# Patient Record
Sex: MALE | NOT HISPANIC OR LATINO | Employment: OTHER | URBAN - NONMETROPOLITAN AREA
[De-identification: names, ages, dates, MRNs, and addresses within clinical notes are randomized per-mention and may not be internally consistent; named-entity substitution may affect disease eponyms.]

---

## 2023-09-30 PROBLEM — F51.05 INSOMNIA DUE TO OTHER MENTAL DISORDER: Status: ACTIVE | Noted: 2018-08-29

## 2023-09-30 PROBLEM — F43.21 ADJUSTMENT REACTION, DEPRESSIVE, BRIEF: Status: ACTIVE | Noted: 2018-07-03

## 2023-09-30 PROBLEM — M75.52 SUBACROMIAL BURSITIS OF LEFT SHOULDER JOINT: Status: ACTIVE | Noted: 2023-09-30

## 2023-09-30 PROBLEM — M47.22 OSTEOARTHRITIS OF SPINE WITH RADICULOPATHY, CERVICAL REGION: Status: ACTIVE | Noted: 2023-09-30

## 2023-09-30 PROBLEM — M75.21 BICEPS TENDINITIS OF RIGHT UPPER EXTREMITY: Status: ACTIVE | Noted: 2023-09-30

## 2023-09-30 PROBLEM — M75.41 IMPINGEMENT SYNDROME OF RIGHT SHOULDER: Status: ACTIVE | Noted: 2023-09-30

## 2023-09-30 PROBLEM — M1A.0720 CHRONIC GOUT OF LEFT FOOT: Status: ACTIVE | Noted: 2023-09-30

## 2023-09-30 PROBLEM — E78.2 COMBINED HYPERLIPIDEMIA: Status: ACTIVE | Noted: 2021-05-26

## 2023-09-30 PROBLEM — F40.240 CLAUSTROPHOBIA: Status: ACTIVE | Noted: 2023-09-30

## 2023-09-30 PROBLEM — M19.011 DJD OF RIGHT AC (ACROMIOCLAVICULAR) JOINT: Status: ACTIVE | Noted: 2023-09-30

## 2023-09-30 PROBLEM — R06.02 SHORTNESS OF BREATH: Status: ACTIVE | Noted: 2020-05-14

## 2023-09-30 PROBLEM — M95.8 WINGED SCAPULA OF BOTH SIDES: Status: ACTIVE | Noted: 2023-09-30

## 2023-09-30 PROBLEM — M22.8X1 MALTRACKING OF RIGHT PATELLA: Status: ACTIVE | Noted: 2023-09-30

## 2023-09-30 PROBLEM — D72.829 LEUKOCYTOSIS: Status: ACTIVE | Noted: 2020-11-24

## 2023-09-30 PROBLEM — N40.0 BENIGN PROSTATIC HYPERPLASIA: Status: ACTIVE | Noted: 2023-09-30

## 2023-09-30 PROBLEM — M76.51 PATELLAR TENDINITIS OF RIGHT KNEE: Status: ACTIVE | Noted: 2023-09-30

## 2023-09-30 PROBLEM — M77.12 LEFT LATERAL EPICONDYLITIS: Status: ACTIVE | Noted: 2018-07-03

## 2023-09-30 PROBLEM — F99 INSOMNIA DUE TO OTHER MENTAL DISORDER: Status: ACTIVE | Noted: 2018-08-29

## 2023-09-30 PROBLEM — F17.210 CIGARETTE SMOKER: Status: ACTIVE | Noted: 2020-11-23

## 2023-09-30 PROBLEM — M75.42 ROTATOR CUFF IMPINGEMENT SYNDROME OF LEFT SHOULDER: Status: ACTIVE | Noted: 2023-09-30

## 2023-09-30 PROBLEM — M75.81 TENDINITIS OF RIGHT ROTATOR CUFF: Status: ACTIVE | Noted: 2023-09-30

## 2023-09-30 PROBLEM — J30.2 SEASONAL ALLERGIES: Status: ACTIVE | Noted: 2022-11-23

## 2023-09-30 PROBLEM — S43.431A TEAR OF RIGHT GLENOID LABRUM: Status: ACTIVE | Noted: 2023-09-30

## 2023-09-30 PROBLEM — R31.9 HEMATURIA: Status: ACTIVE | Noted: 2018-08-18

## 2023-09-30 PROBLEM — M15.9 GENERALIZED OSTEOARTHRITIS: Status: ACTIVE | Noted: 2020-11-23

## 2023-09-30 PROBLEM — M70.51 PATELLAR BURSITIS OF RIGHT KNEE: Status: ACTIVE | Noted: 2023-09-30

## 2023-09-30 PROBLEM — F41.9 CHRONIC ANXIETY: Status: ACTIVE | Noted: 2018-07-03

## 2023-09-30 PROBLEM — J30.9 ALLERGIC RHINITIS DUE TO ALLERGEN: Status: ACTIVE | Noted: 2022-10-28

## 2023-09-30 RX ORDER — TAMSULOSIN HYDROCHLORIDE 0.4 MG/1
1 CAPSULE ORAL DAILY
COMMUNITY
Start: 2018-07-03

## 2023-09-30 RX ORDER — CETIRIZINE HYDROCHLORIDE 10 MG/1
10 TABLET ORAL DAILY
COMMUNITY
Start: 2022-11-23

## 2023-09-30 RX ORDER — ALBUTEROL SULFATE 90 UG/1
1-2 AEROSOL, METERED RESPIRATORY (INHALATION) EVERY 6 HOURS PRN
COMMUNITY
Start: 2022-10-28 | End: 2024-06-03 | Stop reason: SDUPTHER

## 2023-09-30 RX ORDER — PAROXETINE HYDROCHLORIDE 20 MG/1
20 TABLET, FILM COATED ORAL DAILY
COMMUNITY
Start: 2023-02-13 | End: 2024-02-02 | Stop reason: SDUPTHER

## 2023-09-30 RX ORDER — METHYLPREDNISOLONE 4 MG/1
TABLET ORAL
COMMUNITY
Start: 2022-09-15 | End: 2024-06-03 | Stop reason: ALTCHOICE

## 2023-09-30 RX ORDER — CHOLECALCIFEROL (VITAMIN D3) 50 MCG
2000 TABLET ORAL DAILY
COMMUNITY

## 2023-09-30 RX ORDER — FLUTICASONE PROPIONATE 220 UG/1
2 AEROSOL, METERED RESPIRATORY (INHALATION) 2 TIMES DAILY
COMMUNITY
Start: 2022-10-28

## 2023-10-02 ENCOUNTER — DOCUMENTATION (OUTPATIENT)
Dept: PHYSICAL THERAPY | Facility: CLINIC | Age: 63
End: 2023-10-02
Payer: COMMERCIAL

## 2023-10-02 ENCOUNTER — APPOINTMENT (OUTPATIENT)
Dept: PHYSICAL THERAPY | Facility: CLINIC | Age: 63
End: 2023-10-02
Payer: COMMERCIAL

## 2023-10-02 NOTE — PROGRESS NOTES
Physical Therapy                 Therapy Communication Note    Patient Name: Vincent P Lanese  MRN: [id number]  Today's Date: 10/2/2023     Discipline: Physical Therapy    Missed Visit Reason:      Missed Time: Cancel    Comment:

## 2023-10-16 ENCOUNTER — TREATMENT (OUTPATIENT)
Dept: PHYSICAL THERAPY | Facility: CLINIC | Age: 63
End: 2023-10-16
Payer: COMMERCIAL

## 2023-10-16 DIAGNOSIS — M79.672 LEFT FOOT PAIN: Primary | ICD-10-CM

## 2023-10-16 DIAGNOSIS — M25.542 METACARPOPHALANGEAL JOINT PAIN OF LEFT HAND: ICD-10-CM

## 2023-10-16 PROCEDURE — 97110 THERAPEUTIC EXERCISES: CPT | Mod: GP | Performed by: PHYSICAL THERAPIST

## 2023-10-16 NOTE — PROGRESS NOTES
Physical Therapy    Physical Therapy Re-Assessment and Treatment    Patient Name: Vincent P Lanese  MRN: [id number]  Today's Date: 10/16/2023  Time Calculation  Start Time: 0906  Stop Time: 0950  Time Calculation (min): 44 min      Assessment:   The pt has made good progress in PT and met the majority of goals.  He has not been to therapy since 9/25/23 and has been following his HEP on land and pool.  At this time, he has no issues, except discomfort on the top of his foot when wearing shoes.  He will follow up with his doctor tomorrow    Plan:   Will Discharge after he sees the doctor tomorrow unless Dr. Bardales identifies further need for therapy.    Current Problem  1. Left foot pain        2. Revision of L 1st MTP joint fusion    Subjective   General  The pt was doing well with his insert and Alamo extension, but in the shoe over a couple of hours, but has a red bump where it rubs on the top of the foot. He isn't sure if he needs a Cortizone shot or a new surgery to take hardware out and will see the doctor tomorrow.    He feels swelling is subsiding and mobility is good.  He is walking a lot.  He works out in the pool including walking, jumping, and running.  He hasn't tried golf yet.  He reports occasional cane use.     Pain current 2/10  Range 1/10 to 8-9/10   Uses compressoin, barefoot, lidocaine spray      Objective   Lower Extremity  ROM   MMT:                                    L                    L   Dorsiflexion            22degrees   5/5  Plantarflexion         60degrees      5/5  Inversion                 50degrees   5/5  Eversion                 30degrees     5/5    EDEMA:       L  Bimalleolar     27.8 cm  Mid Arch   26 .0  cm     SENSATION:     Mild areas of hyper sensitivity, but overall able to tolerate light and deep pressure.  Pt reports that the pressure of a shoe for 2 or more hours  bothers him     Skin:  Good scar mobility.  No heat.  Mildly redness that may be choric due to multiple  surgeries.    Gait;  Normalized gait pattern with symmetrical gait pattern.    Gait speed; 10 meter in 6 seconds   Jogging: symmetrical  Shuttles:  wihtout difficulty  Curb step (8 inch)  leading L or R - Independent    Outcome Measures:  FAAM ADL:  27/84  FAAM sport: 21/84    Treatments:  Bike 8 minutes at level 1  Light jog 20 feet x2  Shuttles 20 feet x2 L and R  Gait with heel strike and push off 40 feet x4      OP EDUCATION:   No further therapy needs. Recommend discharge, but will wait until he sees the doctor tomorrow.    Goals:   1.  Abolish pain (PARTIALLY MET)  2. Increase L ankle dorsiflexion ROM to 20 degrees (MET)  3. Able to perform prolonged standing activities and gait without swelling or reported limping(PARTIALLY MET)  4. Improved balance to 20+ seconds of SLS on either LE  (MET)  5. reduce hypersensitivity in the L foot (PARTIALLY MET)  6. ambulate with normaized gait pattern on level and advanced surfaces  (MET)  7. ascend and descend curb steps indpendently (MET)  8. Independent in a HEP addressing any remaining deficits  (MET)  9.  Return to prior level of activity including golf (PARTIALLY MET)

## 2023-11-01 ENCOUNTER — TELEPHONE (OUTPATIENT)
Dept: PRIMARY CARE | Facility: CLINIC | Age: 63
End: 2023-11-01
Payer: COMMERCIAL

## 2023-11-01 DIAGNOSIS — F41.9 CHRONIC ANXIETY: ICD-10-CM

## 2023-11-01 RX ORDER — LORAZEPAM 0.5 MG/1
0.5 TABLET ORAL 2 TIMES DAILY PRN
COMMUNITY
Start: 2023-09-30 | End: 2023-11-01 | Stop reason: SDUPTHER

## 2023-11-01 RX ORDER — LORAZEPAM 0.5 MG/1
0.5 TABLET ORAL 2 TIMES DAILY PRN
Qty: 30 TABLET | Refills: 0 | Status: SHIPPED | OUTPATIENT
Start: 2023-11-01 | End: 2023-12-04 | Stop reason: SDUPTHER

## 2023-11-28 ENCOUNTER — DOCUMENTATION (OUTPATIENT)
Dept: PHYSICAL THERAPY | Facility: CLINIC | Age: 63
End: 2023-11-28
Payer: COMMERCIAL

## 2023-11-28 NOTE — PROGRESS NOTES
.Physical Therapy    Discharge Summary        Discharge Information:   Discahrge date: 11/28/23  Date of last treatment: 10/16/23  Date of evaluation  8/2/23  Number of sessions  14    Referred for: L revision of 1st MTP joint fusion  Referred by:    Therapy Summary:   See re-assessment on 10/16/23 for objective measures.    Discharge Status:   Goals were partially met or  met.      Rehab Discharge Reason:   The pt was placed on hold for a one month trail of self management in 10/16/23.  He did not return for treatment of communicate any further need for therapy.    Discharge from PT at this time

## 2023-12-04 ENCOUNTER — TELEPHONE (OUTPATIENT)
Dept: PRIMARY CARE | Facility: CLINIC | Age: 63
End: 2023-12-04
Payer: COMMERCIAL

## 2023-12-04 DIAGNOSIS — F41.9 CHRONIC ANXIETY: ICD-10-CM

## 2023-12-04 NOTE — TELEPHONE ENCOUNTER
Rx Refill Request Telephone Encounter    Name:  Vincent P Lanese  :  [id number]  Medication Name:  Lorazepam .5 mg            Specific [facility] location:  [facility]  Date of last appointment:    Date of next appointment:    Best number to reach patient:

## 2023-12-05 RX ORDER — LORAZEPAM 0.5 MG/1
0.5 TABLET ORAL 2 TIMES DAILY PRN
Qty: 30 TABLET | Refills: 0 | Status: SHIPPED | OUTPATIENT
Start: 2023-12-05 | End: 2024-01-04 | Stop reason: SDUPTHER

## 2024-01-03 ENCOUNTER — TELEPHONE (OUTPATIENT)
Dept: PRIMARY CARE | Facility: CLINIC | Age: 64
End: 2024-01-03
Payer: COMMERCIAL

## 2024-01-03 DIAGNOSIS — F43.21 ADJUSTMENT REACTION, DEPRESSIVE, BRIEF: ICD-10-CM

## 2024-01-03 DIAGNOSIS — F41.9 CHRONIC ANXIETY: ICD-10-CM

## 2024-01-04 RX ORDER — LORAZEPAM 0.5 MG/1
0.5 TABLET ORAL 2 TIMES DAILY PRN
Qty: 30 TABLET | Refills: 0 | Status: SHIPPED | OUTPATIENT
Start: 2024-01-04 | End: 2024-02-02 | Stop reason: SDUPTHER

## 2024-01-04 NOTE — TELEPHONE ENCOUNTER
Please advise if you are refilling for the 90 day buffer period     Referral pended for psychiatry

## 2024-02-02 DIAGNOSIS — F41.9 CHRONIC ANXIETY: ICD-10-CM

## 2024-02-02 NOTE — TELEPHONE ENCOUNTER
Rx's pended. He has not been informed of need for psychiatry referral yet so I did advise him this would be his last refill. Patient verbalized understanding.

## 2024-02-03 RX ORDER — LORAZEPAM 0.5 MG/1
0.5 TABLET ORAL 2 TIMES DAILY PRN
Qty: 30 TABLET | Refills: 0 | Status: SHIPPED | OUTPATIENT
Start: 2024-02-03

## 2024-02-03 RX ORDER — PAROXETINE HYDROCHLORIDE 20 MG/1
20 TABLET, FILM COATED ORAL DAILY
Qty: 90 TABLET | Refills: 1 | Status: SHIPPED | OUTPATIENT
Start: 2024-02-03

## 2024-02-12 ENCOUNTER — LAB (OUTPATIENT)
Dept: LAB | Facility: LAB | Age: 64
End: 2024-02-12
Payer: COMMERCIAL

## 2024-02-12 DIAGNOSIS — E78.2 MIXED HYPERLIPIDEMIA: Primary | ICD-10-CM

## 2024-02-12 LAB
ALBUMIN SERPL BCP-MCNC: 4.5 G/DL (ref 3.4–5)
ALP SERPL-CCNC: 94 U/L (ref 33–136)
ALT SERPL W P-5'-P-CCNC: 19 U/L (ref 10–52)
ANION GAP SERPL CALC-SCNC: 10 MMOL/L (ref 10–20)
APPEARANCE UR: CLEAR
AST SERPL W P-5'-P-CCNC: 19 U/L (ref 9–39)
BASOPHILS # BLD AUTO: 0.07 X10*3/UL (ref 0–0.1)
BASOPHILS NFR BLD AUTO: 1 %
BILIRUB SERPL-MCNC: 0.9 MG/DL (ref 0–1.2)
BILIRUB UR STRIP.AUTO-MCNC: NEGATIVE MG/DL
BUN SERPL-MCNC: 14 MG/DL (ref 6–23)
CALCIUM SERPL-MCNC: 9.6 MG/DL (ref 8.6–10.3)
CHLORIDE SERPL-SCNC: 104 MMOL/L (ref 98–107)
CHOLEST SERPL-MCNC: 211 MG/DL (ref 0–199)
CHOLESTEROL/HDL RATIO: 6
CO2 SERPL-SCNC: 29 MMOL/L (ref 21–32)
COLOR UR: YELLOW
CREAT SERPL-MCNC: 1.24 MG/DL (ref 0.5–1.3)
EGFRCR SERPLBLD CKD-EPI 2021: 66 ML/MIN/1.73M*2
EOSINOPHIL # BLD AUTO: 0.22 X10*3/UL (ref 0–0.7)
EOSINOPHIL NFR BLD AUTO: 3.1 %
ERYTHROCYTE [DISTWIDTH] IN BLOOD BY AUTOMATED COUNT: 13.2 % (ref 11.5–14.5)
GLUCOSE SERPL-MCNC: 89 MG/DL (ref 74–99)
GLUCOSE UR STRIP.AUTO-MCNC: NEGATIVE MG/DL
HCT VFR BLD AUTO: 42.7 % (ref 41–52)
HDLC SERPL-MCNC: 35.2 MG/DL
HGB BLD-MCNC: 14.4 G/DL (ref 13.5–17.5)
IMM GRANULOCYTES # BLD AUTO: 0.02 X10*3/UL (ref 0–0.7)
IMM GRANULOCYTES NFR BLD AUTO: 0.3 % (ref 0–0.9)
KETONES UR STRIP.AUTO-MCNC: NEGATIVE MG/DL
LDLC SERPL CALC-MCNC: 157 MG/DL
LEUKOCYTE ESTERASE UR QL STRIP.AUTO: NEGATIVE
LYMPHOCYTES # BLD AUTO: 2.63 X10*3/UL (ref 1.2–4.8)
LYMPHOCYTES NFR BLD AUTO: 37.4 %
MCH RBC QN AUTO: 29.8 PG (ref 26–34)
MCHC RBC AUTO-ENTMCNC: 33.7 G/DL (ref 32–36)
MCV RBC AUTO: 88 FL (ref 80–100)
MONOCYTES # BLD AUTO: 0.58 X10*3/UL (ref 0.1–1)
MONOCYTES NFR BLD AUTO: 8.3 %
MUCOUS THREADS #/AREA URNS AUTO: NORMAL /LPF
NEUTROPHILS # BLD AUTO: 3.51 X10*3/UL (ref 1.2–7.7)
NEUTROPHILS NFR BLD AUTO: 49.9 %
NITRITE UR QL STRIP.AUTO: NEGATIVE
NON HDL CHOLESTEROL: 176 MG/DL (ref 0–149)
NRBC BLD-RTO: 0 /100 WBCS (ref 0–0)
PH UR STRIP.AUTO: 5 [PH]
PLATELET # BLD AUTO: 243 X10*3/UL (ref 150–450)
POTASSIUM SERPL-SCNC: 4.6 MMOL/L (ref 3.5–5.3)
PROT SERPL-MCNC: 6.5 G/DL (ref 6.4–8.2)
PROT UR STRIP.AUTO-MCNC: NEGATIVE MG/DL
RBC # BLD AUTO: 4.83 X10*6/UL (ref 4.5–5.9)
RBC # UR STRIP.AUTO: ABNORMAL /UL
RBC #/AREA URNS AUTO: NORMAL /HPF
SODIUM SERPL-SCNC: 138 MMOL/L (ref 136–145)
SP GR UR STRIP.AUTO: 1.02
TRIGL SERPL-MCNC: 92 MG/DL (ref 0–149)
UROBILINOGEN UR STRIP.AUTO-MCNC: <2 MG/DL
VLDL: 18 MG/DL (ref 0–40)
WBC # BLD AUTO: 7 X10*3/UL (ref 4.4–11.3)
WBC #/AREA URNS AUTO: NORMAL /HPF

## 2024-02-12 PROCEDURE — 36415 COLL VENOUS BLD VENIPUNCTURE: CPT

## 2024-02-14 ENCOUNTER — OFFICE VISIT (OUTPATIENT)
Dept: PRIMARY CARE | Facility: CLINIC | Age: 64
End: 2024-02-14
Payer: COMMERCIAL

## 2024-02-14 ENCOUNTER — TELEPHONE (OUTPATIENT)
Dept: PRIMARY CARE | Facility: CLINIC | Age: 64
End: 2024-02-14

## 2024-02-14 VITALS
SYSTOLIC BLOOD PRESSURE: 100 MMHG | OXYGEN SATURATION: 98 % | WEIGHT: 195 LBS | HEIGHT: 66 IN | HEART RATE: 65 BPM | DIASTOLIC BLOOD PRESSURE: 62 MMHG | BODY MASS INDEX: 31.34 KG/M2 | RESPIRATION RATE: 22 BRPM

## 2024-02-14 DIAGNOSIS — Z23 IMMUNIZATION DUE: Primary | ICD-10-CM

## 2024-02-14 DIAGNOSIS — F43.21 ADJUSTMENT DISORDER WITH DEPRESSED MOOD: ICD-10-CM

## 2024-02-14 DIAGNOSIS — Z00.00 WELL ADULT EXAM: ICD-10-CM

## 2024-02-14 DIAGNOSIS — Z72.0 TOBACCO ABUSE: ICD-10-CM

## 2024-02-14 DIAGNOSIS — R53.83 OTHER FATIGUE: ICD-10-CM

## 2024-02-14 DIAGNOSIS — N41.9 PROSTATITIS, UNSPECIFIED PROSTATITIS TYPE: ICD-10-CM

## 2024-02-14 DIAGNOSIS — Z00.00 ROUTINE GENERAL MEDICAL EXAMINATION AT A HEALTH CARE FACILITY: ICD-10-CM

## 2024-02-14 DIAGNOSIS — E78.5 HYPERLIPIDEMIA, UNSPECIFIED HYPERLIPIDEMIA TYPE: ICD-10-CM

## 2024-02-14 DIAGNOSIS — E78.2 COMBINED HYPERLIPIDEMIA: ICD-10-CM

## 2024-02-14 DIAGNOSIS — Z12.5 SPECIAL SCREENING FOR MALIGNANT NEOPLASM OF PROSTATE: ICD-10-CM

## 2024-02-14 PROCEDURE — G0008 ADMIN INFLUENZA VIRUS VAC: HCPCS | Performed by: FAMILY MEDICINE

## 2024-02-14 PROCEDURE — 99396 PREV VISIT EST AGE 40-64: CPT | Performed by: FAMILY MEDICINE

## 2024-02-14 PROCEDURE — 90686 IIV4 VACC NO PRSV 0.5 ML IM: CPT | Performed by: FAMILY MEDICINE

## 2024-02-14 PROCEDURE — 4004F PT TOBACCO SCREEN RCVD TLK: CPT | Performed by: FAMILY MEDICINE

## 2024-02-14 RX ORDER — SULFAMETHOXAZOLE AND TRIMETHOPRIM 800; 160 MG/1; MG/1
1 TABLET ORAL 2 TIMES DAILY
COMMUNITY
End: 2024-06-03 | Stop reason: ALTCHOICE

## 2024-02-14 RX ORDER — CIPROFLOXACIN 500 MG/1
500 TABLET ORAL 2 TIMES DAILY
COMMUNITY
Start: 2024-01-13 | End: 2024-06-03 | Stop reason: ALTCHOICE

## 2024-02-14 ASSESSMENT — ENCOUNTER SYMPTOMS
OCCASIONAL FEELINGS OF UNSTEADINESS: 0
DEPRESSION: 0
LOSS OF SENSATION IN FEET: 0

## 2024-02-14 ASSESSMENT — PATIENT HEALTH QUESTIONNAIRE - PHQ9
1. LITTLE INTEREST OR PLEASURE IN DOING THINGS: NOT AT ALL
2. FEELING DOWN, DEPRESSED OR HOPELESS: NOT AT ALL
SUM OF ALL RESPONSES TO PHQ9 QUESTIONS 1 AND 2: 0

## 2024-02-14 ASSESSMENT — PAIN SCALES - GENERAL: PAINLEVEL: 10-WORST PAIN EVER

## 2024-02-14 NOTE — PROGRESS NOTES
Subjective   Patient ID: Vincent P Lanese is a 63 y.o. male who presents for Annual Exam.    HPI Jett is seen for for his comprehensive physical exam. PMH, PSH, family history and social history were reviewed and updated.  Jett is seen today for follow-up of longstanding depression and anxiety . he has been relatively well controlled currently on Paxil 20 mg qd and doing well. for his anxiety he also uses lorazepam 0.5 mg prn for episodes of anxiety.   Jett is seen today for follow-up of significant multiple musculoskeletal complaints. previously has had labral tear of the right shoulder back in May of 2019 as well as an October of 2019 he had the same repair to his left shoulder with the labrum repair. He suffers from chronic intermittent neck spasms, with a history of cervical degenerative joint disease .  Jett is seen today for follow-up of benign prostatic hypertrophy and he sees Urology. He is on medication at this time.He sees urology.   Pt had tetanus shot in 2021. Has not had shingles shot. He has agreed to get shingles series in the near future.  Had one pneumococcal immunization. He will receive influenza today.   Pt is unsure when he had colonoscopy.  He will contact GI.  He smokes 1/2 ppd and will getting nicotine patches to help quit.  He intends to use nicotine gum.  He does not use alcohol.    Review of Systems  Constitutional Symptoms: He is negative for fever, loss of appetite, headaches, fatigue.   Eyes: He is negative for loss and blurring of vision, double vision.   Ear, Nose, Mouth, Throat: He is negative for hearing loss, tinnitus, nasal congestion, rhinorrhea, nose bleeds, teeth problems, mouth sores, gum disease, dysphagia, sore throat.   Cardiovascular: He is negative for chest pain/pressure, palpitations, edema, claudication.   Respiratory: He is negative for shortness of breath, dyspnea on exertion, pain with breathing, coughing.   Breast: He is negative for tenderness, masses,  "gynecomastia.   Gastrointestinal: He is negative for anorexia, indigestion, nausea, vomiting, abdominal pain, change in bowel habits, diarrhea, constipation, hematochezia, melena, blood in stool.   Musculoskeletal: He is Positive for joint pain , stiffness , myalgias . Negative for joint swelling, myalgias, cramps.   Integumentary: He is negative for change in mole, skin trouble or rash.   Neurological: He is negative for headache, numbness, tingling, weakness, tremors.   Psychiatric: is positive for depression and anxiety   Endocrine: He is negative for weight gain, heat or cold intolerance, polyuria, polydipsia, polyphagia.   Hematologic/Lymphatic: He is negative for bruising, abnormal bleeding, swollen glands.  Objective   /62 (BP Location: Left arm, Patient Position: Sitting, BP Cuff Size: Large adult)   Pulse 65   Resp 22   Ht 1.676 m (5' 6\")   Wt 88.5 kg (195 lb)   SpO2 98%   BMI 31.47 kg/m²     Physical Exam  general: Vitals reviewed , Jett sits in the exam room chair . He is a pleasant animated male. Awake alert oriented.   HEENT : Head is normocephalic atraumatic. Well groomed short cropped hair with male pattern alopecia noted .   Ears: Normal externally , moderate cerumen in the canals , TMs not visualized.  Eyes : Conjunctiva and sclera clear , pupils equal round reactive , EOMI.   Nose: moist oral mucosa   Oropharynx: Not examined due to mask.   Neck: Soft and supple without adenopathy thyromegaly or asymmetry .   Chest: Normal to inspection , no barrel chest . Pulmonary : Clear breath sounds posteriorly without wheezing rales rhonchi.   Cardiovascular : Regular rate rhythm , no murmurs rubs or gallops. Normal S1-S2. Carotids have no bruit bilaterally . DP and radial pulses are both 2+ . There is no clubbing, no edema.  Abdomen: Flat , soft , no tenderness guarding rigidity.   Genitourinary / anorectal: deferred. Patient sees urology.  Musculoskeletal: Left lower extremity is in a postop " shoe. relatively good strength and range of motion throughout the upper lower extremities .   Neurologic: Intact and nonfocal , cranial nerves 2-12 grossly intact .   Integumentary: No bruising rashes or eruptions.  Psych: Mood and affect are pleasant and appropriate.  Assessment/Plan   Problem List Items Addressed This Visit    None  Visit Diagnoses         Codes    Immunization due    -  Primary  Patient to receive influenza immunization today.  Patient will return in a month for shingles #1, 3 months after that shingles #2. Z23    Relevant Orders    Flu vaccine (IIV4) age 6 months and greater, preservative free    Follow Up In Primary Care - Nurse Visit    Follow Up In Primary Care - Nurse Visit    Zoster vaccine, recombinant, adult (SHINGRIX)    Zoster vaccine, recombinant, adult (SHINGRIX)    Tobacco abuse    Needs to quit.  Patient smokes about a half a pack a day and has done so for 35 years.  He is intends to attempt nicotine gum. Z72.0    Adjustment disorder with depressed mood    Stable.  Continue on paroxetine. F43.21    Well adult exam    Normal exam. Z00.00    Hyperlipidemia, unspecified hyperlipidemia type    Monitor.  Patient has an LDL of 157.  His total was 217.  It was elevated like this about 4 years ago.  However he has no family history or personal history of hyperlipidemia. E78.5    Prostatitis, unspecified prostatitis type    Needs better control.  Currently under care for urology.  He is been on antibiotics for almost 4 months. N41.9

## 2024-05-02 ENCOUNTER — TELEPHONE (OUTPATIENT)
Dept: PRIMARY CARE | Facility: CLINIC | Age: 64
End: 2024-05-02
Payer: COMMERCIAL

## 2024-05-02 DIAGNOSIS — J30.2 SEASONAL ALLERGIES: Primary | ICD-10-CM

## 2024-05-02 RX ORDER — CETIRIZINE HYDROCHLORIDE 10 MG/1
10 TABLET ORAL DAILY
Qty: 30 TABLET | Refills: 2 | Status: SHIPPED | OUTPATIENT
Start: 2024-05-02 | End: 2024-06-03 | Stop reason: SDUPTHER

## 2024-05-02 RX ORDER — FLUTICASONE PROPIONATE 50 MCG
1 SPRAY, SUSPENSION (ML) NASAL DAILY
Qty: 16 G | Refills: 11 | Status: SHIPPED | OUTPATIENT
Start: 2024-05-02 | End: 2024-06-03 | Stop reason: SDUPTHER

## 2024-05-02 NOTE — TELEPHONE ENCOUNTER
PATIENT WOULD LIKE A PRESCRIPTION CALLED IN FOR HIS ALLERGIES BEFORE THE ILLNESS AFFECTS HIS LUNGS.    PATIENT STATES HE HAS HIS MOTHER TODAY AND CAN'T COME TO THE OFFICE FOR AN APPOINTMENT.    PHARMACY [facility]    PATIENT CAN BE REACHED [phone]

## 2024-05-17 ENCOUNTER — TELEPHONE (OUTPATIENT)
Dept: PRIMARY CARE | Facility: CLINIC | Age: 64
End: 2024-05-17
Payer: COMMERCIAL

## 2024-05-29 ENCOUNTER — TELEPHONE (OUTPATIENT)
Dept: PRIMARY CARE | Facility: CLINIC | Age: 64
End: 2024-05-29
Payer: COMMERCIAL

## 2024-05-29 DIAGNOSIS — J30.9 ALLERGIC RHINITIS, UNSPECIFIED SEASONALITY, UNSPECIFIED TRIGGER: Primary | ICD-10-CM

## 2024-05-29 RX ORDER — METHYLPREDNISOLONE 4 MG/1
TABLET ORAL
Qty: 21 TABLET | Refills: 0 | Status: SHIPPED | OUTPATIENT
Start: 2024-05-29 | End: 2024-06-04 | Stop reason: ALTCHOICE

## 2024-05-29 NOTE — TELEPHONE ENCOUNTER
Patient went to [facility] 5/21 for URI and still has cough with chest congestion and a little rattle, after the albuterol inhaler and Medrol Dose Sd. His car is not working so [place]. He has tried everything OTC.  Could he get another Medrol Dose Sd to [facility] please? Otherwise, he will find a ride for tomorrow.

## 2024-06-03 ENCOUNTER — OFFICE VISIT (OUTPATIENT)
Dept: PRIMARY CARE | Facility: CLINIC | Age: 64
End: 2024-06-03
Payer: COMMERCIAL

## 2024-06-03 VITALS
HEART RATE: 76 BPM | SYSTOLIC BLOOD PRESSURE: 128 MMHG | OXYGEN SATURATION: 96 % | DIASTOLIC BLOOD PRESSURE: 70 MMHG | BODY MASS INDEX: 31.96 KG/M2 | WEIGHT: 198 LBS

## 2024-06-03 DIAGNOSIS — J30.2 SEASONAL ALLERGIES: ICD-10-CM

## 2024-06-03 DIAGNOSIS — R06.02 SOB (SHORTNESS OF BREATH): Primary | ICD-10-CM

## 2024-06-03 PROCEDURE — 99214 OFFICE O/P EST MOD 30 MIN: CPT | Performed by: FAMILY MEDICINE

## 2024-06-03 RX ORDER — CETIRIZINE HYDROCHLORIDE 10 MG/1
10 TABLET ORAL DAILY
Qty: 30 TABLET | Refills: 2 | Status: SHIPPED | OUTPATIENT
Start: 2024-06-03 | End: 2024-09-01

## 2024-06-03 RX ORDER — FLUTICASONE PROPIONATE 50 MCG
1 SPRAY, SUSPENSION (ML) NASAL DAILY
Qty: 16 G | Refills: 11 | Status: SHIPPED | OUTPATIENT
Start: 2024-06-03 | End: 2025-06-03

## 2024-06-03 RX ORDER — FLUTICASONE FUROATE 100 UG/1
1 POWDER RESPIRATORY (INHALATION) DAILY
Qty: 1 EACH | Refills: 11 | Status: SHIPPED | OUTPATIENT
Start: 2024-06-03 | End: 2025-06-03

## 2024-06-03 RX ORDER — ALBUTEROL SULFATE 90 UG/1
1-2 AEROSOL, METERED RESPIRATORY (INHALATION) EVERY 6 HOURS PRN
Qty: 18 G | Refills: 2 | Status: SHIPPED | OUTPATIENT
Start: 2024-06-03

## 2024-06-03 ASSESSMENT — PAIN SCALES - GENERAL: PAINLEVEL: 0-NO PAIN

## 2024-06-04 NOTE — PROGRESS NOTES
"Subjective   Patient ID: Vincent P Lanese is a 63 y.o. male who presents for Cough (Feels like something in his lungs/chest-  thinks he \"swallowed some grass from his weed [place]\"/He was seen at Urgent care approximately 2 weeks ago and was given prednisone taper and inhaler.//Also finished 2nd Prednisone taper Rx called in from this office -  he finished it today).    HPI patient is here today for a cough and some chest congestion with a little bit of subjective wheezing.  He does not have a history of recurrent seasonal allergies but has noticed that he gets much more wheezy when he is mowing the lawn.  He went to an urgent care and received a prednisone taper which seemed to help him.  He also has been using an inhaler frequently to help with his wheezing and shortness of breath.    Review of Systems  Constitutional: Patient is negative for fever, fatigue, weight change.  HEENT: Patient is positive for postnasal drip that is clear.  Patient is negative for change in vision, hearing, swallow.  Pulmonary: Patient is positive for wheezing and mild shortness of breath.  Patient is negative for cough.  Cardio: Patient is negative for chest pain, lower extremity edema.  Objective   /70 (BP Location: Left arm, Patient Position: Sitting)   Pulse 76   Wt 89.8 kg (198 lb)   SpO2 96%   BMI 31.96 kg/m²     Physical Exam  General: Awake and alert no apparent distress.  HEENT: Moist oral mucosa no cervical lymphadenopathy.  Cardio: Heart S1-S2 no murmur rub or gallop.  Pulmonary: Lungs clear to auscultation laterally.  Assessment/Plan   Problem List Items Addressed This Visit             ICD-10-CM    Seasonal allergies needs better control.  Begin cetirizine and fluticasone. J30.2    Relevant Medications    cetirizine (ZyrTEC) 10 mg tablet    fluticasone (Flonase) 50 mcg/actuation nasal spray    albuterol (ProAir HFA) 90 mcg/actuation inhaler    fluticasone furoate (Arnuity Ellipta) 100 mcg/actuation inhaler "     Other Visit Diagnoses         Codes    SOB (shortness of breath)    -  Primary   needs better control.  Begin fluticasone inhaler and refill albuterol MDI for as needed use. R06.02

## 2024-06-13 ENCOUNTER — TELEPHONE (OUTPATIENT)
Dept: PRIMARY CARE | Facility: CLINIC | Age: 64
End: 2024-06-13

## 2024-06-13 ENCOUNTER — APPOINTMENT (OUTPATIENT)
Dept: PRIMARY CARE | Facility: CLINIC | Age: 64
End: 2024-06-13
Payer: COMMERCIAL

## 2024-06-13 VITALS — SYSTOLIC BLOOD PRESSURE: 112 MMHG | TEMPERATURE: 98.7 F | DIASTOLIC BLOOD PRESSURE: 74 MMHG

## 2024-06-13 DIAGNOSIS — Z23 IMMUNIZATION DUE: Primary | ICD-10-CM

## 2024-06-13 DIAGNOSIS — Z23 IMMUNIZATION DUE: ICD-10-CM

## 2024-06-13 PROCEDURE — 90750 HZV VACC RECOMBINANT IM: CPT | Performed by: FAMILY MEDICINE

## 2024-06-13 PROCEDURE — 90471 IMMUNIZATION ADMIN: CPT | Performed by: FAMILY MEDICINE

## 2024-06-13 PROCEDURE — 4004F PT TOBACCO SCREEN RCVD TLK: CPT | Performed by: FAMILY MEDICINE

## 2024-06-13 PROCEDURE — 99024 POSTOP FOLLOW-UP VISIT: CPT | Performed by: FAMILY MEDICINE

## 2024-06-13 NOTE — PROGRESS NOTES
Subjective   Patient ID: Vincent P Lanese is a 63 y.o. male who presents for Immunizations (Shingles vaccine).    HPI     Review of Systems    Objective   /74 (BP Location: Left arm, Patient Position: Sitting, BP Cuff Size: Large adult)   Temp 37.1 °C (98.7 °F)     Physical Exam    Assessment/Plan        Patient not seen for office visit today.  He was here for Shingrix immunization.  This was not an office visit this was a nurse visit.

## 2024-07-16 ENCOUNTER — HOSPITAL ENCOUNTER (OUTPATIENT)
Dept: RADIOLOGY | Facility: CLINIC | Age: 64
Discharge: HOME | End: 2024-07-16
Payer: COMMERCIAL

## 2024-07-16 ENCOUNTER — OFFICE VISIT (OUTPATIENT)
Dept: PRIMARY CARE | Facility: CLINIC | Age: 64
End: 2024-07-16
Payer: COMMERCIAL

## 2024-07-16 ENCOUNTER — LAB (OUTPATIENT)
Dept: LAB | Facility: LAB | Age: 64
End: 2024-07-16
Payer: COMMERCIAL

## 2024-07-16 VITALS
DIASTOLIC BLOOD PRESSURE: 70 MMHG | HEART RATE: 67 BPM | WEIGHT: 195.4 LBS | BODY MASS INDEX: 31.54 KG/M2 | SYSTOLIC BLOOD PRESSURE: 110 MMHG | OXYGEN SATURATION: 95 %

## 2024-07-16 DIAGNOSIS — R10.11 RIGHT UPPER QUADRANT ABDOMINAL PAIN: ICD-10-CM

## 2024-07-16 DIAGNOSIS — R10.84 GENERALIZED ABDOMINAL PAIN: ICD-10-CM

## 2024-07-16 DIAGNOSIS — R10.11 RIGHT UPPER QUADRANT ABDOMINAL PAIN: Primary | ICD-10-CM

## 2024-07-16 DIAGNOSIS — R07.81 RIB PAIN ON RIGHT SIDE: ICD-10-CM

## 2024-07-16 LAB
ALBUMIN SERPL-MCNC: 4.9 G/DL (ref 3.5–5)
ALP BLD-CCNC: 105 U/L (ref 35–125)
ALT SERPL-CCNC: 19 U/L (ref 5–40)
AMYLASE SERPL-CCNC: 93 U/L (ref 28–100)
ANION GAP SERPL CALC-SCNC: 14 MMOL/L
AST SERPL-CCNC: 14 U/L (ref 5–40)
BASOPHILS # BLD AUTO: 0.08 X10*3/UL (ref 0–0.1)
BASOPHILS NFR BLD AUTO: 0.8 %
BILIRUB SERPL-MCNC: 0.6 MG/DL (ref 0.1–1.2)
BUN SERPL-MCNC: 20 MG/DL (ref 8–25)
CALCIUM SERPL-MCNC: 10.3 MG/DL (ref 8.5–10.4)
CHLORIDE SERPL-SCNC: 99 MMOL/L (ref 97–107)
CO2 SERPL-SCNC: 26 MMOL/L (ref 24–31)
CREAT SERPL-MCNC: 1.4 MG/DL (ref 0.4–1.6)
EGFRCR SERPLBLD CKD-EPI 2021: 57 ML/MIN/1.73M*2
EOSINOPHIL # BLD AUTO: 0.12 X10*3/UL (ref 0–0.7)
EOSINOPHIL NFR BLD AUTO: 1.3 %
ERYTHROCYTE [DISTWIDTH] IN BLOOD BY AUTOMATED COUNT: 12.7 % (ref 11.5–14.5)
GLUCOSE SERPL-MCNC: 108 MG/DL (ref 65–99)
HCT VFR BLD AUTO: 48.1 % (ref 41–52)
HGB BLD-MCNC: 16.1 G/DL (ref 13.5–17.5)
IMM GRANULOCYTES # BLD AUTO: 0.03 X10*3/UL (ref 0–0.7)
IMM GRANULOCYTES NFR BLD AUTO: 0.3 % (ref 0–0.9)
LIPASE SERPL-CCNC: 55 U/L (ref 16–63)
LYMPHOCYTES # BLD AUTO: 2.57 X10*3/UL (ref 1.2–4.8)
LYMPHOCYTES NFR BLD AUTO: 27 %
MCH RBC QN AUTO: 30.1 PG (ref 26–34)
MCHC RBC AUTO-ENTMCNC: 33.5 G/DL (ref 32–36)
MCV RBC AUTO: 90 FL (ref 80–100)
MONOCYTES # BLD AUTO: 0.57 X10*3/UL (ref 0.1–1)
MONOCYTES NFR BLD AUTO: 6 %
NEUTROPHILS # BLD AUTO: 6.15 X10*3/UL (ref 1.2–7.7)
NEUTROPHILS NFR BLD AUTO: 64.6 %
NRBC BLD-RTO: 0 /100 WBCS (ref 0–0)
PLATELET # BLD AUTO: 253 X10*3/UL (ref 150–450)
POC APPEARANCE, URINE: CLEAR
POC BILIRUBIN, URINE: NEGATIVE
POC BLOOD, URINE: ABNORMAL
POC COLOR, URINE: YELLOW
POC GLUCOSE, URINE: NEGATIVE MG/DL
POC KETONES, URINE: NEGATIVE MG/DL
POC LEUKOCYTES, URINE: NEGATIVE
POC NITRITE,URINE: NEGATIVE
POC PH, URINE: 6 PH
POC PROTEIN, URINE: NEGATIVE MG/DL
POC SPECIFIC GRAVITY, URINE: 1.01
POC UROBILINOGEN, URINE: 0.2 EU/DL
POTASSIUM SERPL-SCNC: 4.7 MMOL/L (ref 3.4–5.1)
PROT SERPL-MCNC: 7.1 G/DL (ref 5.9–7.9)
RBC # BLD AUTO: 5.34 X10*6/UL (ref 4.5–5.9)
SODIUM SERPL-SCNC: 139 MMOL/L (ref 133–145)
WBC # BLD AUTO: 9.5 X10*3/UL (ref 4.4–11.3)

## 2024-07-16 PROCEDURE — 36415 COLL VENOUS BLD VENIPUNCTURE: CPT

## 2024-07-16 PROCEDURE — 99214 OFFICE O/P EST MOD 30 MIN: CPT

## 2024-07-16 PROCEDURE — 83690 ASSAY OF LIPASE: CPT

## 2024-07-16 PROCEDURE — 71046 X-RAY EXAM CHEST 2 VIEWS: CPT | Performed by: RADIOLOGY

## 2024-07-16 PROCEDURE — 71046 X-RAY EXAM CHEST 2 VIEWS: CPT

## 2024-07-16 PROCEDURE — 85025 COMPLETE CBC W/AUTO DIFF WBC: CPT

## 2024-07-16 PROCEDURE — 82150 ASSAY OF AMYLASE: CPT

## 2024-07-16 PROCEDURE — 81003 URINALYSIS AUTO W/O SCOPE: CPT

## 2024-07-16 PROCEDURE — 80053 COMPREHEN METABOLIC PANEL: CPT

## 2024-07-16 ASSESSMENT — PATIENT HEALTH QUESTIONNAIRE - PHQ9
2. FEELING DOWN, DEPRESSED OR HOPELESS: NOT AT ALL
1. LITTLE INTEREST OR PLEASURE IN DOING THINGS: NOT AT ALL
SUM OF ALL RESPONSES TO PHQ9 QUESTIONS 1 AND 2: 0

## 2024-07-16 ASSESSMENT — PAIN SCALES - GENERAL: PAINLEVEL: 0-NO PAIN

## 2024-07-16 NOTE — PROGRESS NOTES
Subjective     Patient ID: Vincent P Lanese is a 63 y.o. male who presents for Follow-up (Cramping under right side of ribs x 2 weeks. Body aches and problems urinating as well as with bowel movements. Has pulled ticks off and worried about lyme disease. His mom got shingles and before she broke out, she had rib pain, too //Reports trouble sleeping and being clammy ).    ENIO Khan presents for concerns of right upper quadrant pain and spasms beneath his right ribs, worse with inspiration.  Reports spasms when he moves his right side torso. Denies any recent trauma or heavy lifting.  Reports a dull pressure pain in the RUQ. He reports he is having abnormal bowel movement over the last 7-10 days and does seem to correlate with his RUQ pain. Feel hot/cold flashes but denies fever.  Occasional nausea, no vomiting. Appetite is good.  Denies increase in abdominal pain with meals but dull aching is constantly there.       Current Outpatient Medications:     albuterol (ProAir HFA) 90 mcg/actuation inhaler, Inhale 1-2 puffs every 6 hours if needed for shortness of breath or wheezing., Disp: 18 g, Rfl: 2    cetirizine (ZyrTEC) 10 mg tablet, Take 1 tablet (10 mg) by mouth once daily., Disp: 30 tablet, Rfl: 2    cholecalciferol (Vitamin D-3) 50 MCG (2000 UT) tablet, Take 1 tablet (2,000 Units) by mouth once daily., Disp: , Rfl:     fluticasone (Flonase) 50 mcg/actuation nasal spray, Administer 1 spray into each nostril once daily. Shake gently. Before first use, prime pump. After use, clean tip and replace cap., Disp: 16 g, Rfl: 11    fluticasone (Flovent HFA) 220 mcg/actuation inhaler, Inhale 2 puffs 2 times a day., Disp: , Rfl:     fluticasone furoate (Arnuity Ellipta) 100 mcg/actuation inhaler, Inhale 1 puff once daily. Rinse mouth with water after use to reduce aftertaste and incidence of candidiasis. Do not swallow., Disp: 1 each, Rfl: 11    PARoxetine (Paxil) 20 mg tablet, Take 1 tablet (20 mg) by mouth once daily.,  Disp: 90 tablet, Rfl: 1    tamsulosin (Flomax) 0.4 mg 24 hr capsule, Take 1 capsule (0.4 mg) by mouth once daily., Disp: , Rfl:     LORazepam (Ativan) 0.5 mg tablet, Take 1 tablet (0.5 mg) by mouth 2 times a day as needed for anxiety. (Patient not taking: Reported on 7/16/2024), Disp: 30 tablet, Rfl: 0      Review of Systems  All other systems have been reviewed and are negative except as noted in the HPI.         Objective       /70 (BP Location: Left arm)   Pulse 67   Wt 88.6 kg (195 lb 6.4 oz)   SpO2 95%   BMI 31.54 kg/m²   Physical Exam  Vitals and nursing note reviewed.   Constitutional:       General: He is not in acute distress.     Appearance: Normal appearance.   Cardiovascular:      Rate and Rhythm: Normal rate and regular rhythm.      Heart sounds: Normal heart sounds, S1 normal and S2 normal.   Pulmonary:      Effort: Pulmonary effort is normal. No accessory muscle usage or respiratory distress.      Breath sounds: Normal breath sounds and air entry. No decreased breath sounds, wheezing, rhonchi or rales.   Chest:      Chest wall: Tenderness (tenderness along the right costal margin) present. No deformity or swelling.   Breasts:     Breasts are symmetrical.   Abdominal:      General: Abdomen is flat. Bowel sounds are normal. There is no distension or abdominal bruit.      Palpations: Abdomen is soft. There is no shifting dullness, fluid wave, hepatomegaly, splenomegaly, mass or pulsatile mass.      Tenderness: There is abdominal tenderness. There is no right CVA tenderness, left CVA tenderness, guarding or rebound.       Musculoskeletal:         General: Normal range of motion.      Right lower leg: No edema.      Left lower leg: No edema.   Skin:     General: Skin is warm and dry.      Findings: No rash.   Neurological:      General: No focal deficit present.      Mental Status: He is alert.   Psychiatric:         Behavior: Behavior is cooperative.             Assessment & Plan  Right upper  quadrant abdominal pain  Acute, new problem with undetermined etiology  No red flags upon assessment.  Differential diagnoses cholecystitis, pleural effusion, gastroenteritis, musculoskeletal strain.  Will plan to obtain labs  Will follow-up with results for further plan and intervention.  Orders:    CBC and Auto Differential; Future    Comprehensive metabolic panel; Future    Lipase; Future    Amylase; Future    Rib pain on right side  Obtain chest x-ray as discussed, will follow-up with results  Orders:    XR chest 2 views; Future    Generalized abdominal pain    Orders:    POCT UA Automated manually resulted          Patient understands and agrees with treatment plan.    Gala Reyes, APRN-CNP

## 2024-07-18 ENCOUNTER — TELEPHONE (OUTPATIENT)
Dept: PRIMARY CARE | Facility: CLINIC | Age: 64
End: 2024-07-18
Payer: COMMERCIAL

## 2024-07-26 ENCOUNTER — TELEPHONE (OUTPATIENT)
Dept: PRIMARY CARE | Facility: CLINIC | Age: 64
End: 2024-07-26
Payer: COMMERCIAL

## 2024-07-26 DIAGNOSIS — R10.11 RIGHT UPPER QUADRANT ABDOMINAL PAIN: Primary | ICD-10-CM

## 2024-07-26 NOTE — TELEPHONE ENCOUNTER
Patient can be reached at [phone]    Patient would like a call from [facility] he's still in Pain under rib cage. Patient stated it's hard to urinate and have a bowel movement. He would like to speak with [facility] regarding next steps since all test came back normal.

## 2024-07-28 ENCOUNTER — HOSPITAL ENCOUNTER (OUTPATIENT)
Dept: RADIOLOGY | Facility: HOSPITAL | Age: 64
Discharge: HOME | End: 2024-07-28
Payer: COMMERCIAL

## 2024-07-28 DIAGNOSIS — R10.11 RIGHT UPPER QUADRANT ABDOMINAL PAIN: ICD-10-CM

## 2024-07-28 PROCEDURE — 76705 ECHO EXAM OF ABDOMEN: CPT | Performed by: RADIOLOGY

## 2024-07-28 PROCEDURE — 76705 ECHO EXAM OF ABDOMEN: CPT

## 2024-07-29 ENCOUNTER — TELEPHONE (OUTPATIENT)
Dept: PRIMARY CARE | Facility: CLINIC | Age: 64
End: 2024-07-29
Payer: COMMERCIAL

## 2024-07-29 NOTE — TELEPHONE ENCOUNTER
Patient called [phone]  he went for the CT scan yesterday he would like to know the results he Is in a lot of pain, told him Dr Hernandez will be in this pm

## 2024-07-29 NOTE — TELEPHONE ENCOUNTER
Pt called back informed  him of Gala message he said he is in so much pain and has nausea he wants a RX for pain  Alexis Nelson advised ER if in that much pain, wants Dr Hernandez to call him

## 2024-08-01 DIAGNOSIS — F41.9 CHRONIC ANXIETY: ICD-10-CM

## 2024-08-01 RX ORDER — PAROXETINE HYDROCHLORIDE 20 MG/1
20 TABLET, FILM COATED ORAL DAILY
Qty: 90 TABLET | Refills: 1 | Status: SHIPPED | OUTPATIENT
Start: 2024-08-01

## 2024-08-05 ENCOUNTER — TELEPHONE (OUTPATIENT)
Dept: PRIMARY CARE | Facility: CLINIC | Age: 64
End: 2024-08-05
Payer: COMMERCIAL

## 2024-08-05 NOTE — TELEPHONE ENCOUNTER
Patient called back he has not had any relief with his ABD  pain going to his back he said Dr Hernandez would order a CAT scan if he was not better and he is worse, told him he Is out

## 2024-08-06 DIAGNOSIS — R10.11 RIGHT UPPER QUADRANT ABDOMINAL PAIN: Primary | ICD-10-CM

## 2024-08-08 ENCOUNTER — TELEPHONE (OUTPATIENT)
Dept: PRIMARY CARE | Facility: CLINIC | Age: 64
End: 2024-08-08
Payer: COMMERCIAL

## 2024-08-08 NOTE — TELEPHONE ENCOUNTER
Spoke to patient he spoke with his insurance and they said he can schedule his cat scan as soon as possible meg to it being covered. Patient states he will call and see if he can get in for his CT tmr and if not he will call here and see someone . He was very appreciative.

## 2024-08-08 NOTE — TELEPHONE ENCOUNTER
Patient called  [phone] Dr Joy sent in a Cat scan he scheduled it for 8-9 but due to [facility] did not write it as a STAT  they rescheduled it for 8-23  due to insurance - he cannot wait that long can he re-write as a STAT ? He is aware [facility] is out . Also asked if he will be put in a tube as he is claustrophobic and would need a RX for Valium

## 2024-08-09 ENCOUNTER — OFFICE VISIT (OUTPATIENT)
Dept: PRIMARY CARE | Facility: CLINIC | Age: 64
End: 2024-08-09
Payer: COMMERCIAL

## 2024-08-09 VITALS
HEART RATE: 71 BPM | TEMPERATURE: 98 F | WEIGHT: 198 LBS | BODY MASS INDEX: 31.82 KG/M2 | HEIGHT: 66 IN | OXYGEN SATURATION: 97 % | DIASTOLIC BLOOD PRESSURE: 70 MMHG | SYSTOLIC BLOOD PRESSURE: 110 MMHG

## 2024-08-09 DIAGNOSIS — R10.11 ABDOMINAL WALL PAIN IN RIGHT UPPER QUADRANT: Primary | ICD-10-CM

## 2024-08-09 DIAGNOSIS — R10.11 RIGHT UPPER QUADRANT ABDOMINAL PAIN: ICD-10-CM

## 2024-08-09 RX ORDER — CYCLOBENZAPRINE HCL 5 MG
5 TABLET ORAL 3 TIMES DAILY PRN
Qty: 21 TABLET | Refills: 0 | Status: SHIPPED | OUTPATIENT
Start: 2024-08-09 | End: 2024-08-16

## 2024-08-09 ASSESSMENT — ENCOUNTER SYMPTOMS
COUGH: 0
MYALGIAS: 0
WOUND: 0
RHINORRHEA: 0
FATIGUE: 0
SHORTNESS OF BREATH: 0
FREQUENCY: 0
RECTAL PAIN: 0
NAUSEA: 0
LIGHT-HEADEDNESS: 0
WHEEZING: 0
DIARRHEA: 0
SINUS PRESSURE: 0
FEVER: 0
POLYDIPSIA: 0
VOMITING: 0
SLEEP DISTURBANCE: 0
DYSURIA: 0
ABDOMINAL PAIN: 1
BLOOD IN STOOL: 0
PALPITATIONS: 0
DIZZINESS: 0
NERVOUS/ANXIOUS: 0
HEADACHES: 0
SINUS PAIN: 0
DYSPHORIC MOOD: 0
CONSTIPATION: 1
ABDOMINAL DISTENTION: 1
ARTHRALGIAS: 0
CHILLS: 0

## 2024-08-09 ASSESSMENT — PATIENT HEALTH QUESTIONNAIRE - PHQ9
SUM OF ALL RESPONSES TO PHQ9 QUESTIONS 1 AND 2: 0
2. FEELING DOWN, DEPRESSED OR HOPELESS: NOT AT ALL
1. LITTLE INTEREST OR PLEASURE IN DOING THINGS: NOT AT ALL

## 2024-08-09 ASSESSMENT — PAIN SCALES - GENERAL: PAINLEVEL: 10-WORST PAIN EVER

## 2024-08-09 NOTE — PROGRESS NOTES
"Subjective   Patient ID: Vincent P Lanese is a 63 y.o. male who presents for Abdominal Pain (RUQ pain.  This started approximately one month ago. Has increased anxiety and feels some shortness of breath./He has been seen the office for this within the last few weeks.  He has not gotten/CT of abdomen/pelvis that was ordered due to insurance issues.   He had some labs 7/16/2024).    Here today with ongoing right upper quadrant pain.  Pain has been present x5 weeks.  Described as pressure.  Worse with bending forward.  Has been having difficulty with bowels and bladder, although he is able to urinate and defecate.  .  Has had difficulty with breathing due to sensation of pressure.  Has had kidney stones in the past.  Pain sometimes radiates to left side as well as right shoulder.               Review of Systems   Constitutional:  Negative for chills, fatigue and fever.   HENT:  Negative for congestion, hearing loss, rhinorrhea, sinus pressure, sinus pain and tinnitus.    Eyes:  Negative for visual disturbance.   Respiratory:  Negative for cough, shortness of breath and wheezing.    Cardiovascular:  Negative for chest pain, palpitations and leg swelling.   Gastrointestinal:  Positive for abdominal distention, abdominal pain and constipation. Negative for blood in stool, diarrhea, nausea, rectal pain and vomiting.   Endocrine: Negative for cold intolerance, heat intolerance, polydipsia and polyuria.   Genitourinary:  Negative for dysuria, frequency and urgency.   Musculoskeletal:  Negative for arthralgias and myalgias.   Skin:  Negative for pallor, rash and wound.   Neurological:  Negative for dizziness, light-headedness and headaches.   Psychiatric/Behavioral:  Negative for dysphoric mood and sleep disturbance. The patient is not nervous/anxious.        Objective     Visit Vitals  /70 (BP Location: Left arm, Patient Position: Sitting, BP Cuff Size: Adult)   Pulse 71   Temp 36.7 °C (98 °F)   Ht 1.676 m (5' 6\")   Wt " 89.8 kg (198 lb)   SpO2 97%   BMI 31.96 kg/m²   Smoking Status Every Day   BSA 2.04 m²         Physical Exam  Constitutional:       Appearance: Normal appearance. He is obese.   HENT:      Head: Normocephalic and atraumatic.      Right Ear: Tympanic membrane, ear canal and external ear normal.      Left Ear: Tympanic membrane, ear canal and external ear normal.      Nose: Nose normal.      Mouth/Throat:      Mouth: Mucous membranes are moist.      Pharynx: Oropharynx is clear.   Eyes:      Extraocular Movements: Extraocular movements intact.      Conjunctiva/sclera: Conjunctivae normal.      Pupils: Pupils are equal, round, and reactive to light.   Cardiovascular:      Rate and Rhythm: Normal rate and regular rhythm.      Pulses: Normal pulses.      Heart sounds: Normal heart sounds.   Pulmonary:      Effort: Pulmonary effort is normal.      Breath sounds: Normal breath sounds.   Abdominal:      General: There is no distension.      Palpations: Abdomen is soft. There is no mass.      Tenderness: There is abdominal tenderness (RUQ tenderness). There is no right CVA tenderness, left CVA tenderness, guarding or rebound.      Hernia: No hernia is present.   Musculoskeletal:         General: Normal range of motion.   Skin:     General: Skin is warm and dry.   Neurological:      Mental Status: He is alert and oriented to person, place, and time. Mental status is at baseline.   Psychiatric:         Mood and Affect: Mood normal.         Behavior: Behavior normal.         Assessment & Plan  Abdominal wall pain in right upper quadrant    Orders:    cyclobenzaprine (Flexeril) 5 mg tablet; Take 1 tablet (5 mg) by mouth 3 times a day as needed for muscle spasms for up to 7 days.  Discussed not to drive when taking this medication  Right upper quadrant abdominal pain    Orders:    Microscopic Only, Urine; Future  Reviewed recent workup including blood tests, UA, RUQ US.  1+ blood noted on urine dip.  Will obtain micro to confirm  or disprove presence of hematuria.     Patient advised to contact [employer] imaging schedule line rather than using automated system to schedule imaging.      Will follow up regarding CT results.   Reviewed and approved by SELAM GRANT on 8/9/24 at 12:27 PM.

## 2024-08-09 NOTE — PATIENT INSTRUCTIONS
CT at [facility]   [phone]   Monday - Friday  8 a.m. - 4:30 p.m.  Saturday  8 a.m. - 11 a.m.

## 2024-08-13 ENCOUNTER — APPOINTMENT (OUTPATIENT)
Dept: PRIMARY CARE | Facility: CLINIC | Age: 64
End: 2024-08-13
Payer: COMMERCIAL

## 2024-08-21 ENCOUNTER — TELEPHONE (OUTPATIENT)
Dept: PRIMARY CARE | Facility: CLINIC | Age: 64
End: 2024-08-21
Payer: COMMERCIAL

## 2024-08-21 DIAGNOSIS — R10.11 ABDOMINAL WALL PAIN IN RIGHT UPPER QUADRANT: ICD-10-CM

## 2024-08-21 RX ORDER — CYCLOBENZAPRINE HCL 5 MG
5 TABLET ORAL 3 TIMES DAILY PRN
Qty: 21 TABLET | Refills: 0 | Status: SHIPPED | OUTPATIENT
Start: 2024-08-21 | End: 2024-08-28

## 2024-08-21 NOTE — TELEPHONE ENCOUNTER
Sent additional 1 week of the muscle relaxer.  If he needs further prescriptions he should be seen in person again

## 2024-08-21 NOTE — PROGRESS NOTES
Patient with ongoing abdominal pain due to muscle strain.  Responded previously to cyclobenzaprine.  Sending short refill.  Patient advised that he will need to be seen if he requires further fills of this medication.   Assessment & Plan  Abdominal wall pain in right upper quadrant    Orders:    cyclobenzaprine (Flexeril) 5 mg tablet; Take 1 tablet (5 mg) by mouth 3 times a day as needed for muscle spasms for up to 7 days.

## 2024-08-21 NOTE — TELEPHONE ENCOUNTER
Pt was seen by TASHI on 8/9/24 , was given muscle relaxer, which helped pt a lot. He is out and that painful feeling is coming back. Can he get more called in ? He also wondered if a steroid would help. Please advise .    Silviano tyson

## 2024-08-23 ENCOUNTER — APPOINTMENT (OUTPATIENT)
Dept: RADIOLOGY | Facility: HOSPITAL | Age: 64
End: 2024-08-23
Payer: COMMERCIAL

## 2024-08-23 ENCOUNTER — APPOINTMENT (OUTPATIENT)
Dept: PRIMARY CARE | Facility: CLINIC | Age: 64
End: 2024-08-23
Payer: COMMERCIAL

## 2024-08-26 ENCOUNTER — HOSPITAL ENCOUNTER (OUTPATIENT)
Dept: RADIOLOGY | Facility: HOSPITAL | Age: 64
Discharge: HOME | End: 2024-08-26
Payer: COMMERCIAL

## 2024-08-26 DIAGNOSIS — R10.11 RIGHT UPPER QUADRANT ABDOMINAL PAIN: ICD-10-CM

## 2024-08-26 PROCEDURE — 74177 CT ABD & PELVIS W/CONTRAST: CPT

## 2024-08-26 PROCEDURE — 74177 CT ABD & PELVIS W/CONTRAST: CPT | Performed by: RADIOLOGY

## 2024-08-26 PROCEDURE — 2550000001 HC RX 255 CONTRASTS: Performed by: FAMILY MEDICINE

## 2024-08-28 ENCOUNTER — TELEPHONE (OUTPATIENT)
Dept: PRIMARY CARE | Facility: CLINIC | Age: 64
End: 2024-08-28
Payer: COMMERCIAL

## 2024-08-29 ENCOUNTER — TELEPHONE (OUTPATIENT)
Dept: PRIMARY CARE | Facility: CLINIC | Age: 64
End: 2024-08-29
Payer: COMMERCIAL

## 2024-08-29 NOTE — TELEPHONE ENCOUNTER
I had refilled this medicaiton on 8/21/24 at which time it was stated that he should be seen prior to more refills.

## 2024-08-29 NOTE — TELEPHONE ENCOUNTER
Pt had the ct scan, he is still having the muscle spasms .  He did make an appt for 9/4 with TASHI.  He would like more muscle relaxers called in to [facility].  They do help

## 2024-09-04 ENCOUNTER — APPOINTMENT (OUTPATIENT)
Dept: PRIMARY CARE | Facility: CLINIC | Age: 64
End: 2024-09-04
Payer: COMMERCIAL

## 2024-09-04 ENCOUNTER — LAB (OUTPATIENT)
Dept: LAB | Facility: LAB | Age: 64
End: 2024-09-04
Payer: COMMERCIAL

## 2024-09-04 VITALS
SYSTOLIC BLOOD PRESSURE: 122 MMHG | DIASTOLIC BLOOD PRESSURE: 66 MMHG | TEMPERATURE: 98 F | HEART RATE: 72 BPM | OXYGEN SATURATION: 98 %

## 2024-09-04 DIAGNOSIS — R68.83 CHILLS: Primary | ICD-10-CM

## 2024-09-04 DIAGNOSIS — K92.1 MELENA: ICD-10-CM

## 2024-09-04 DIAGNOSIS — S70.362S TICK BITE OF LEFT THIGH, SEQUELA: ICD-10-CM

## 2024-09-04 DIAGNOSIS — R10.11 ABDOMINAL WALL PAIN IN RIGHT UPPER QUADRANT: ICD-10-CM

## 2024-09-04 DIAGNOSIS — R68.83 CHILLS: ICD-10-CM
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
## 2024-09-04 DIAGNOSIS — R35.0 URINARY FREQUENCY: ICD-10-CM

## 2024-09-04 LAB
ALBUMIN SERPL-MCNC: 4.6 G/DL (ref 3.5–5)
ALP BLD-CCNC: 114 U/L (ref 35–125)
ALT SERPL-CCNC: 25 U/L (ref 5–40)
ANION GAP SERPL CALC-SCNC: 13 MMOL/L
AST SERPL-CCNC: 21 U/L (ref 5–40)
BILIRUB SERPL-MCNC: 0.3 MG/DL (ref 0.1–1.2)
BUN SERPL-MCNC: 15 MG/DL (ref 8–25)
CALCIUM SERPL-MCNC: 9.5 MG/DL (ref 8.5–10.4)
CHLORIDE SERPL-SCNC: 103 MMOL/L (ref 97–107)
CO2 SERPL-SCNC: 26 MMOL/L (ref 24–31)
CREAT SERPL-MCNC: 1.1 MG/DL (ref 0.4–1.6)
CRP SERPL-MCNC: <0.3 MG/DL (ref 0–2)
EGFRCR SERPLBLD CKD-EPI 2021: 76 ML/MIN/1.73M*2
ERYTHROCYTE [DISTWIDTH] IN BLOOD BY AUTOMATED COUNT: 12.5 % (ref 11.5–14.5)
ERYTHROCYTE [SEDIMENTATION RATE] IN BLOOD BY WESTERGREN METHOD: 3 MM/H (ref 0–20)
GLUCOSE SERPL-MCNC: 102 MG/DL (ref 65–99)
HCT VFR BLD AUTO: 45.2 % (ref 41–52)
HGB BLD-MCNC: 15.4 G/DL (ref 13.5–17.5)
MCH RBC QN AUTO: 30.2 PG (ref 26–34)
MCHC RBC AUTO-ENTMCNC: 34.1 G/DL (ref 32–36)
MCV RBC AUTO: 89 FL (ref 80–100)
NRBC BLD-RTO: 0 /100 WBCS (ref 0–0)
PLATELET # BLD AUTO: 196 X10*3/UL (ref 150–450)
POC APPEARANCE, URINE: CLEAR
POC BILIRUBIN, URINE: NEGATIVE
POC BLOOD, URINE: ABNORMAL
POC COLOR, URINE: YELLOW
POC GLUCOSE, URINE: NEGATIVE MG/DL
POC KETONES, URINE: NEGATIVE MG/DL
POC LEUKOCYTES, URINE: NEGATIVE
POC NITRITE,URINE: NEGATIVE
POC PH, URINE: 6 PH
POC PROTEIN, URINE: NEGATIVE MG/DL
POC SPECIFIC GRAVITY, URINE: 1.01
POC UROBILINOGEN, URINE: 0.2 EU/DL
POTASSIUM SERPL-SCNC: 4.2 MMOL/L (ref 3.4–5.1)
PROT SERPL-MCNC: 6.8 G/DL (ref 5.9–7.9)
RBC # BLD AUTO: 5.1 X10*6/UL (ref 4.5–5.9)
SODIUM SERPL-SCNC: 142 MMOL/L (ref 133–145)
WBC # BLD AUTO: 4.6 X10*3/UL (ref 4.4–11.3)

## 2024-09-04 PROCEDURE — 80053 COMPREHEN METABOLIC PANEL: CPT

## 2024-09-04 PROCEDURE — 85027 COMPLETE CBC AUTOMATED: CPT

## 2024-09-04 PROCEDURE — 99214 OFFICE O/P EST MOD 30 MIN: CPT | Performed by: STUDENT IN AN ORGANIZED HEALTH CARE EDUCATION/TRAINING PROGRAM

## 2024-09-04 PROCEDURE — 87476 LYME DIS DNA AMP PROBE: CPT

## 2024-09-04 PROCEDURE — 85652 RBC SED RATE AUTOMATED: CPT

## 2024-09-04 PROCEDURE — 86140 C-REACTIVE PROTEIN: CPT

## 2024-09-04 PROCEDURE — 81003 URINALYSIS AUTO W/O SCOPE: CPT | Performed by: STUDENT IN AN ORGANIZED HEALTH CARE EDUCATION/TRAINING PROGRAM

## 2024-09-04 PROCEDURE — 36415 COLL VENOUS BLD VENIPUNCTURE: CPT

## 2024-09-04 PROCEDURE — 4004F PT TOBACCO SCREEN RCVD TLK: CPT | Performed by: STUDENT IN AN ORGANIZED HEALTH CARE EDUCATION/TRAINING PROGRAM

## 2024-09-04 RX ORDER — TIZANIDINE 4 MG/1
4 TABLET ORAL EVERY 6 HOURS PRN
Qty: 60 TABLET | Refills: 0 | Status: SHIPPED | OUTPATIENT
Start: 2024-09-04 | End: 2024-09-19

## 2024-09-04 ASSESSMENT — ENCOUNTER SYMPTOMS
MYALGIAS: 0
DYSURIA: 0
SHORTNESS OF BREATH: 0
POLYDIPSIA: 0
SINUS PAIN: 0
LIGHT-HEADEDNESS: 0
NERVOUS/ANXIOUS: 0
DIZZINESS: 0
COUGH: 0
FREQUENCY: 1
SLEEP DISTURBANCE: 0
CONSTIPATION: 0
HEADACHES: 0
CHILLS: 1
FEVER: 0
SINUS PRESSURE: 0
DYSPHORIC MOOD: 0
VOMITING: 0
DIARRHEA: 1
ARTHRALGIAS: 0
BLOOD IN STOOL: 1
WHEEZING: 0
NAUSEA: 1
FATIGUE: 0
WOUND: 0
RHINORRHEA: 0
ABDOMINAL PAIN: 1
PALPITATIONS: 0

## 2024-09-04 ASSESSMENT — PAIN SCALES - GENERAL: PAINLEVEL: 4

## 2024-09-04 NOTE — PROGRESS NOTES
Subjective   Patient ID: Vincent P Lanese is a 63 y.o. male who presents for Abdominal Pain.    Patient here today in follow-up for abdominal pain.  He was seen by myself recently.  At that time it seemed likely that he had a muscle strain.  He was prescribed cyclobenzaprine, which did help with his pain, however pain has subsequently returned and has been worsening.  He continues to mostly have right upper quadrant pain.  However, he also has had some pain that radiates to his left lower quadrant.  He has had some intermittent episodes of loose stools.  He has had 1 bowel movement which described as large, dark and sticky, as well as having significant amounts of mucus associated with it.  He does say that he has had some intermittent bright red blood per rectum.  Has had some chills but has not noted any objective fevers.  He does report frequent tick bites.  He did have 1 in July which has left a indurated lesion although no characteristic bull's-eye rash.        Review of Systems   Constitutional:  Positive for chills. Negative for fatigue and fever.   HENT:  Negative for congestion, hearing loss, rhinorrhea, sinus pressure, sinus pain and tinnitus.    Eyes:  Negative for visual disturbance.   Respiratory:  Negative for cough, shortness of breath and wheezing.    Cardiovascular:  Negative for chest pain, palpitations and leg swelling.   Gastrointestinal:  Positive for abdominal pain, blood in stool, diarrhea and nausea. Negative for constipation and vomiting.   Endocrine: Negative for cold intolerance, heat intolerance, polydipsia and polyuria.   Genitourinary:  Positive for frequency. Negative for dysuria and urgency.   Musculoskeletal:  Negative for arthralgias and myalgias.   Skin:  Negative for pallor, rash and wound.   Neurological:  Negative for dizziness, light-headedness and headaches.   Psychiatric/Behavioral:  Negative for dysphoric mood and sleep disturbance. The patient is not nervous/anxious.         Objective     Visit Vitals  /66 (BP Location: Left arm)   Pulse 72   Temp 36.7 °C (98 °F) (Temporal)   SpO2 98%   Smoking Status Every Day         Physical Exam  Constitutional:       Appearance: Normal appearance. He is obese.   HENT:      Head: Normocephalic and atraumatic.      Right Ear: Tympanic membrane, ear canal and external ear normal.      Left Ear: Tympanic membrane, ear canal and external ear normal.      Nose: Nose normal.      Mouth/Throat:      Mouth: Mucous membranes are moist.      Pharynx: Oropharynx is clear.   Eyes:      Extraocular Movements: Extraocular movements intact.      Conjunctiva/sclera: Conjunctivae normal.      Pupils: Pupils are equal, round, and reactive to light.   Cardiovascular:      Rate and Rhythm: Normal rate and regular rhythm.      Pulses: Normal pulses.      Heart sounds: Normal heart sounds.   Pulmonary:      Effort: Pulmonary effort is normal.      Breath sounds: Normal breath sounds.   Abdominal:      General: There is no distension.      Palpations: Abdomen is soft. There is no mass.      Tenderness: There is abdominal tenderness. There is no right CVA tenderness, left CVA tenderness, guarding or rebound.      Hernia: No hernia is present.   Musculoskeletal:         General: Normal range of motion.   Skin:     General: Skin is warm and dry.   Neurological:      Mental Status: He is alert and oriented to person, place, and time. Mental status is at baseline.   Psychiatric:         Mood and Affect: Mood normal.         Behavior: Behavior normal.         Assessment & Plan  Urinary frequency    Orders:    POCT UA Automated manually resulted  UA in office today was unremarkable.  Chills    Orders:    Comprehensive metabolic panel; Future    CBC; Future    Lyme Disease (Borrelia burgdorferi), PCR; Future    Sedimentation Rate; Future    C-reactive protein; Future  Given ongoing and evolving symptoms and somewhat concern for some sort of inflammatory process.  It  is plausible that this symptom constellation could represent hepatic involvement from subacute Lyme disease given his frequent tick bites.  Tick bite of left thigh, sequela    Orders:    Lyme Disease (Borrelia burgdorferi), PCR; Future    Melena    Orders:    Colonoscopy Diagnostic; Future    Esophagogastroduodenoscopy (EGD); Future  I do think it is worthwhile for patient to have a proper investigation as to the source of his melena.  I recommended that he speak to his established gastroenterologist.  He has seen Dr. Lockhart in the past.  In the event that he is unable to get an appointment with their office, I did place orders for diagnostic colonoscopy and EGD.  Abdominal wall pain in right upper quadrant    Orders:    tiZANidine (Zanaflex) 4 mg tablet; Take 1 tablet (4 mg) by mouth every 6 hours if needed for muscle spasms for up to 15 days.    I will follow-up with the patient regarding lab results as soon as available.  Reviewed and approved by SELAM GRANT on 9/4/24 at 12:09 PM.

## 2024-09-06 ENCOUNTER — TELEPHONE (OUTPATIENT)
Dept: PRIMARY CARE | Facility: CLINIC | Age: 64
End: 2024-09-06
Payer: COMMERCIAL

## 2024-09-06 DIAGNOSIS — R10.11 ABDOMINAL WALL PAIN IN RIGHT UPPER QUADRANT: Primary | ICD-10-CM

## 2024-09-06 RX ORDER — METHYLPREDNISOLONE 4 MG/1
TABLET ORAL
Qty: 21 TABLET | Refills: 0 | Status: SHIPPED | OUTPATIENT
Start: 2024-09-06 | End: 2024-09-13

## 2024-09-06 NOTE — TELEPHONE ENCOUNTER
Did the tizanidine provide any relief?  I know he previously said cyclobenzaprine was quite helpful but we tried changing it since he was too drowsy on this.

## 2024-09-06 NOTE — TELEPHONE ENCOUNTER
We can do a steroid pack to see if it will help his symptoms. He should be aware however that it will not help heal him as such.  I sent a Medrol Dosepak to his pharmacy.

## 2024-09-06 NOTE — TELEPHONE ENCOUNTER
Blood counts, liver and kidney function, and inflammatory markers are all normal.  Still waiting on Lyme PCR - this can take a week or more to come back as it is a send out lab.

## 2024-09-07 LAB
B BURGDOR DNA SPEC QL NAA+PROBE: NOT DETECTED
SPECIMEN SOURCE: NORMAL

## 2024-09-12 ENCOUNTER — TELEPHONE (OUTPATIENT)
Dept: PRIMARY CARE | Facility: CLINIC | Age: 64
End: 2024-09-12
Payer: COMMERCIAL

## 2024-09-12 NOTE — TELEPHONE ENCOUNTER
Patient believes he has gallstones and would like an ABX and pain medication. He says he has been dealing with this for 9 weeks now. Had an ultrasound of the gallbladder on 07/28/24. Saw TASHI on 08/09, and 09/04 while FABY was out of the office. He has side pain, clammy feeling, nausea and diarrhea. He can not get a hold of his urologist at [facility] but also believes it is not his prostate. He would like something called into [facility] in [place]

## 2024-09-13 ENCOUNTER — APPOINTMENT (OUTPATIENT)
Dept: RADIOLOGY | Facility: HOSPITAL | Age: 64
End: 2024-09-13
Payer: COMMERCIAL

## 2024-09-13 ENCOUNTER — APPOINTMENT (OUTPATIENT)
Dept: PRIMARY CARE | Facility: CLINIC | Age: 64
End: 2024-09-13
Payer: COMMERCIAL

## 2024-09-13 ENCOUNTER — OFFICE VISIT (OUTPATIENT)
Dept: PRIMARY CARE | Facility: CLINIC | Age: 64
End: 2024-09-13
Payer: COMMERCIAL

## 2024-09-13 ENCOUNTER — APPOINTMENT (OUTPATIENT)
Dept: CARDIOLOGY | Facility: HOSPITAL | Age: 64
End: 2024-09-13
Payer: COMMERCIAL

## 2024-09-13 ENCOUNTER — HOSPITAL ENCOUNTER (EMERGENCY)
Facility: HOSPITAL | Age: 64
Discharge: HOME | End: 2024-09-13
Attending: STUDENT IN AN ORGANIZED HEALTH CARE EDUCATION/TRAINING PROGRAM
Payer: COMMERCIAL

## 2024-09-13 VITALS
SYSTOLIC BLOOD PRESSURE: 120 MMHG | OXYGEN SATURATION: 70 % | RESPIRATION RATE: 18 BRPM | TEMPERATURE: 97.3 F | HEART RATE: 73 BPM | BODY MASS INDEX: 31.02 KG/M2 | WEIGHT: 193 LBS | DIASTOLIC BLOOD PRESSURE: 86 MMHG | HEIGHT: 66 IN

## 2024-09-13 VITALS
BODY MASS INDEX: 31.15 KG/M2 | DIASTOLIC BLOOD PRESSURE: 68 MMHG | SYSTOLIC BLOOD PRESSURE: 114 MMHG | WEIGHT: 193 LBS | TEMPERATURE: 97.7 F | OXYGEN SATURATION: 95 % | HEART RATE: 73 BPM

## 2024-09-13 DIAGNOSIS — R10.11 RIGHT UPPER QUADRANT ABDOMINAL PAIN: Primary | ICD-10-CM

## 2024-09-13 LAB
ALBUMIN SERPL BCP-MCNC: 4 G/DL (ref 3.4–5)
ALP SERPL-CCNC: 76 U/L (ref 33–136)
ALT SERPL W P-5'-P-CCNC: 16 U/L (ref 10–52)
ANION GAP SERPL CALC-SCNC: 11 MMOL/L (ref 10–20)
APAP SERPL-MCNC: <10 UG/ML
APPEARANCE UR: CLEAR
AST SERPL W P-5'-P-CCNC: 14 U/L (ref 9–39)
BASOPHILS # BLD AUTO: 0.06 X10*3/UL (ref 0–0.1)
BASOPHILS NFR BLD AUTO: 0.6 %
BILIRUB DIRECT SERPL-MCNC: 0.1 MG/DL (ref 0–0.3)
BILIRUB SERPL-MCNC: 0.8 MG/DL (ref 0–1.2)
BILIRUB UR STRIP.AUTO-MCNC: NEGATIVE MG/DL
BNP SERPL-MCNC: 30 PG/ML (ref 0–99)
BUN SERPL-MCNC: 15 MG/DL (ref 6–23)
CALCIUM SERPL-MCNC: 9.2 MG/DL (ref 8.6–10.3)
CARDIAC TROPONIN I PNL SERPL HS: <3 NG/L (ref 0–20)
CHLORIDE SERPL-SCNC: 105 MMOL/L (ref 98–107)
CO2 SERPL-SCNC: 27 MMOL/L (ref 21–32)
COLOR UR: COLORLESS
CREAT SERPL-MCNC: 0.97 MG/DL (ref 0.5–1.3)
D DIMER PPP FEU-MCNC: 1014 NG/ML FEU
EGFRCR SERPLBLD CKD-EPI 2021: 88 ML/MIN/1.73M*2
EOSINOPHIL # BLD AUTO: 0.09 X10*3/UL (ref 0–0.7)
EOSINOPHIL NFR BLD AUTO: 0.9 %
ERYTHROCYTE [DISTWIDTH] IN BLOOD BY AUTOMATED COUNT: 12 % (ref 11.5–14.5)
ETHANOL SERPL-MCNC: <10 MG/DL
GLUCOSE SERPL-MCNC: 88 MG/DL (ref 74–99)
GLUCOSE UR STRIP.AUTO-MCNC: NORMAL MG/DL
HCT VFR BLD AUTO: 41.6 % (ref 41–52)
HGB BLD-MCNC: 14.5 G/DL (ref 13.5–17.5)
IMM GRANULOCYTES # BLD AUTO: 0.08 X10*3/UL (ref 0–0.7)
IMM GRANULOCYTES NFR BLD AUTO: 0.8 % (ref 0–0.9)
INR PPP: 1 (ref 0.9–1.1)
KETONES UR STRIP.AUTO-MCNC: NEGATIVE MG/DL
LACTATE SERPL-SCNC: 1.2 MMOL/L (ref 0.4–2)
LEUKOCYTE ESTERASE UR QL STRIP.AUTO: NEGATIVE
LIPASE SERPL-CCNC: 59 U/L (ref 9–82)
LYMPHOCYTES # BLD AUTO: 2.81 X10*3/UL (ref 1.2–4.8)
LYMPHOCYTES NFR BLD AUTO: 28.5 %
MAGNESIUM SERPL-MCNC: 1.84 MG/DL (ref 1.6–2.4)
MCH RBC QN AUTO: 30.1 PG (ref 26–34)
MCHC RBC AUTO-ENTMCNC: 34.9 G/DL (ref 32–36)
MCV RBC AUTO: 86 FL (ref 80–100)
MONOCYTES # BLD AUTO: 0.6 X10*3/UL (ref 0.1–1)
MONOCYTES NFR BLD AUTO: 6.1 %
NEUTROPHILS # BLD AUTO: 6.21 X10*3/UL (ref 1.2–7.7)
NEUTROPHILS NFR BLD AUTO: 63.1 %
NITRITE UR QL STRIP.AUTO: NEGATIVE
NRBC BLD-RTO: 0 /100 WBCS (ref 0–0)
PH UR STRIP.AUTO: 6 [PH]
PHOSPHATE SERPL-MCNC: 3 MG/DL (ref 2.5–4.9)
PLATELET # BLD AUTO: 258 X10*3/UL (ref 150–450)
POTASSIUM SERPL-SCNC: 4.1 MMOL/L (ref 3.5–5.3)
PROT SERPL-MCNC: 6.3 G/DL (ref 6.4–8.2)
PROT UR STRIP.AUTO-MCNC: NEGATIVE MG/DL
PROTHROMBIN TIME: 11.1 SECONDS (ref 9.8–12.8)
RBC # BLD AUTO: 4.82 X10*6/UL (ref 4.5–5.9)
RBC # UR STRIP.AUTO: NEGATIVE /UL
SALICYLATES SERPL-MCNC: <3 MG/DL
SODIUM SERPL-SCNC: 139 MMOL/L (ref 136–145)
SP GR UR STRIP.AUTO: 1.01
UROBILINOGEN UR STRIP.AUTO-MCNC: NORMAL MG/DL
WBC # BLD AUTO: 9.9 X10*3/UL (ref 4.4–11.3)

## 2024-09-13 PROCEDURE — 71045 X-RAY EXAM CHEST 1 VIEW: CPT | Mod: FOREIGN READ | Performed by: RADIOLOGY

## 2024-09-13 PROCEDURE — 83690 ASSAY OF LIPASE: CPT | Performed by: HEALTH CARE PROVIDER

## 2024-09-13 PROCEDURE — 71275 CT ANGIOGRAPHY CHEST: CPT

## 2024-09-13 PROCEDURE — 82248 BILIRUBIN DIRECT: CPT

## 2024-09-13 PROCEDURE — 74177 CT ABD & PELVIS W/CONTRAST: CPT

## 2024-09-13 PROCEDURE — 84100 ASSAY OF PHOSPHORUS: CPT

## 2024-09-13 PROCEDURE — 96374 THER/PROPH/DIAG INJ IV PUSH: CPT

## 2024-09-13 PROCEDURE — 96361 HYDRATE IV INFUSION ADD-ON: CPT

## 2024-09-13 PROCEDURE — 76705 ECHO EXAM OF ABDOMEN: CPT | Mod: FOREIGN READ | Performed by: RADIOLOGY

## 2024-09-13 PROCEDURE — 71275 CT ANGIOGRAPHY CHEST: CPT | Mod: FOREIGN READ | Performed by: RADIOLOGY

## 2024-09-13 PROCEDURE — 85610 PROTHROMBIN TIME: CPT | Performed by: HEALTH CARE PROVIDER

## 2024-09-13 PROCEDURE — 83735 ASSAY OF MAGNESIUM: CPT | Performed by: HEALTH CARE PROVIDER

## 2024-09-13 PROCEDURE — 93005 ELECTROCARDIOGRAM TRACING: CPT

## 2024-09-13 PROCEDURE — 71045 X-RAY EXAM CHEST 1 VIEW: CPT

## 2024-09-13 PROCEDURE — 2550000001 HC RX 255 CONTRASTS: Performed by: STUDENT IN AN ORGANIZED HEALTH CARE EDUCATION/TRAINING PROGRAM

## 2024-09-13 PROCEDURE — 99215 OFFICE O/P EST HI 40 MIN: CPT | Performed by: STUDENT IN AN ORGANIZED HEALTH CARE EDUCATION/TRAINING PROGRAM

## 2024-09-13 PROCEDURE — 4004F PT TOBACCO SCREEN RCVD TLK: CPT | Performed by: STUDENT IN AN ORGANIZED HEALTH CARE EDUCATION/TRAINING PROGRAM

## 2024-09-13 PROCEDURE — 83605 ASSAY OF LACTIC ACID: CPT | Performed by: HEALTH CARE PROVIDER

## 2024-09-13 PROCEDURE — 99285 EMERGENCY DEPT VISIT HI MDM: CPT | Mod: 25

## 2024-09-13 PROCEDURE — 85025 COMPLETE CBC W/AUTO DIFF WBC: CPT | Performed by: HEALTH CARE PROVIDER

## 2024-09-13 PROCEDURE — 74177 CT ABD & PELVIS W/CONTRAST: CPT | Mod: FOREIGN READ | Performed by: RADIOLOGY

## 2024-09-13 PROCEDURE — 76705 ECHO EXAM OF ABDOMEN: CPT

## 2024-09-13 PROCEDURE — 96375 TX/PRO/DX INJ NEW DRUG ADDON: CPT

## 2024-09-13 PROCEDURE — 2500000005 HC RX 250 GENERAL PHARMACY W/O HCPCS: Performed by: STUDENT IN AN ORGANIZED HEALTH CARE EDUCATION/TRAINING PROGRAM

## 2024-09-13 PROCEDURE — 84484 ASSAY OF TROPONIN QUANT: CPT | Performed by: HEALTH CARE PROVIDER

## 2024-09-13 PROCEDURE — 2500000004 HC RX 250 GENERAL PHARMACY W/ HCPCS (ALT 636 FOR OP/ED)

## 2024-09-13 PROCEDURE — 83880 ASSAY OF NATRIURETIC PEPTIDE: CPT | Performed by: HEALTH CARE PROVIDER

## 2024-09-13 PROCEDURE — 2500000004 HC RX 250 GENERAL PHARMACY W/ HCPCS (ALT 636 FOR OP/ED): Performed by: STUDENT IN AN ORGANIZED HEALTH CARE EDUCATION/TRAINING PROGRAM

## 2024-09-13 PROCEDURE — 85379 FIBRIN DEGRADATION QUANT: CPT | Performed by: STUDENT IN AN ORGANIZED HEALTH CARE EDUCATION/TRAINING PROGRAM

## 2024-09-13 PROCEDURE — 81003 URINALYSIS AUTO W/O SCOPE: CPT | Performed by: HEALTH CARE PROVIDER

## 2024-09-13 PROCEDURE — 80053 COMPREHEN METABOLIC PANEL: CPT

## 2024-09-13 PROCEDURE — 36415 COLL VENOUS BLD VENIPUNCTURE: CPT | Performed by: HEALTH CARE PROVIDER

## 2024-09-13 PROCEDURE — 80143 DRUG ASSAY ACETAMINOPHEN: CPT | Performed by: STUDENT IN AN ORGANIZED HEALTH CARE EDUCATION/TRAINING PROGRAM

## 2024-09-13 RX ORDER — PANTOPRAZOLE SODIUM 20 MG/1
20 TABLET, DELAYED RELEASE ORAL DAILY
Qty: 30 TABLET | Refills: 0 | Status: SHIPPED | OUTPATIENT
Start: 2024-09-13 | End: 2024-10-13

## 2024-09-13 RX ORDER — MORPHINE SULFATE 4 MG/ML
4 INJECTION INTRAVENOUS ONCE
Status: COMPLETED | OUTPATIENT
Start: 2024-09-13 | End: 2024-09-13

## 2024-09-13 RX ORDER — PANTOPRAZOLE SODIUM 40 MG/10ML
80 INJECTION, POWDER, LYOPHILIZED, FOR SOLUTION INTRAVENOUS ONCE
Status: COMPLETED | OUTPATIENT
Start: 2024-09-13 | End: 2024-09-13

## 2024-09-13 RX ORDER — ONDANSETRON HYDROCHLORIDE 2 MG/ML
4 INJECTION, SOLUTION INTRAVENOUS ONCE
Status: COMPLETED | OUTPATIENT
Start: 2024-09-13 | End: 2024-09-13

## 2024-09-13 ASSESSMENT — ENCOUNTER SYMPTOMS
POLYDIPSIA: 0
FATIGUE: 0
PALPITATIONS: 0
DIARRHEA: 0
FEVER: 0
DYSPHORIC MOOD: 0
ARTHRALGIAS: 0
HEADACHES: 0
COUGH: 0
SHORTNESS OF BREATH: 0
CONSTIPATION: 0
DIZZINESS: 0
SINUS PRESSURE: 0
ABDOMINAL PAIN: 1
CHILLS: 0
MYALGIAS: 0
NERVOUS/ANXIOUS: 0
DYSURIA: 0
NAUSEA: 0
FREQUENCY: 0
RHINORRHEA: 0
WHEEZING: 0
WOUND: 0
SINUS PAIN: 0
SLEEP DISTURBANCE: 0
LIGHT-HEADEDNESS: 0
VOMITING: 0

## 2024-09-13 ASSESSMENT — COLUMBIA-SUICIDE SEVERITY RATING SCALE - C-SSRS
6. HAVE YOU EVER DONE ANYTHING, STARTED TO DO ANYTHING, OR PREPARED TO DO ANYTHING TO END YOUR LIFE?: NO
2. HAVE YOU ACTUALLY HAD ANY THOUGHTS OF KILLING YOURSELF?: NO
1. IN THE PAST MONTH, HAVE YOU WISHED YOU WERE DEAD OR WISHED YOU COULD GO TO SLEEP AND NOT WAKE UP?: NO

## 2024-09-13 ASSESSMENT — LIFESTYLE VARIABLES
TOTAL SCORE: 0
HAVE PEOPLE ANNOYED YOU BY CRITICIZING YOUR DRINKING: NO
HAVE YOU EVER FELT YOU SHOULD CUT DOWN ON YOUR DRINKING: NO
EVER FELT BAD OR GUILTY ABOUT YOUR DRINKING: NO
EVER HAD A DRINK FIRST THING IN THE MORNING TO STEADY YOUR NERVES TO GET RID OF A HANGOVER: NO

## 2024-09-13 ASSESSMENT — PAIN SCALES - GENERAL
PAINLEVEL_OUTOF10: 5 - MODERATE PAIN
PAINLEVEL: 10-WORST PAIN EVER

## 2024-09-13 ASSESSMENT — PAIN - FUNCTIONAL ASSESSMENT: PAIN_FUNCTIONAL_ASSESSMENT: 0-10

## 2024-09-13 ASSESSMENT — PAIN DESCRIPTION - LOCATION: LOCATION: ABDOMEN

## 2024-09-13 ASSESSMENT — PAIN DESCRIPTION - DESCRIPTORS: DESCRIPTORS: ACHING

## 2024-09-13 ASSESSMENT — PAIN DESCRIPTION - PAIN TYPE: TYPE: ACUTE PAIN

## 2024-09-13 ASSESSMENT — PAIN DESCRIPTION - ORIENTATION: ORIENTATION: RIGHT;UPPER

## 2024-09-13 NOTE — ED PROVIDER NOTES
History of Present Illness     History provided by: Patient  Limitations to History: None  External Records Reviewed with Brief Summary:  Reviewed CT abdomen pelvis with contrast from 8/26/2024 in which patient was noted to have no acute abdominopelvic pathology.  Reviewed ultrasound gallbladder from 7/26/2024 in which patient was noted to have hepatic steatosis, gallbladder was nondistended with no evidence of gallstone, wall thickening or surrounding fluid.    HPI:  Vincent P Lanese is a 63 y.o. male presenting to the ED for evaluation of right upper quadrant pain.  Patient endorsing approximately 9 weeks of intermittent right upper quadrant abdominal pain.  Patient was seen by his PCP today and was told to come to the ED for further evaluation.  Patient notes that he believes this is his gallbladder, pain is described as sharp and cramping, intermittent sometimes lasting an hour to a few hours at a time, exacerbated by position changes.  He states that he has seen his PCP for this multiple times, has had an ultrasound and CT that were told were negative however he believes it is his gallbladder states he has a family member who was told she had negative workup and then got her gallbladder removed.  He does note a history of prostatitis, intermittently takes ciprofloxacin for symptoms, he does endorse some intermittent urinary discomfort and difficulty with starting urination as well as pain into his right flank at times.  He endorses intermittent chills, denies measured fevers.  He also reports sometimes having difficulty with his appetite, has been trying to eat a bland diet has not noticed any exacerbation of symptoms around mealtimes.  States his provider initially thought it was a muscle strain he has tried Flexeril and another muscle relaxer with minimal improvement.  He also endorses intermittent shortness of breath but is not having any shortness of breath at this time.  Denies chest pain, palpitations,  lightheadedness.  He endorses intermittent constipation, 1 episode of bloody BM 2 weeks ago but has had no further episodes.  Has nausea without vomiting.  PCP had concern that patient had some guarding on exam as well as subjective jaundice so sentiment.    Physical Exam   Triage vitals:  T 36.4 °C (97.5 °F)  HR 67  /74  RR 20  O2 99 % None (Room air)    General: Awake, alert, in no acute distress, non-toxic appearing  Eyes: Gaze conjugate.  No scleral icterus or injection  HENT: Normo-cephalic, atraumatic.   CV: Regular rate, regular rhythm. Cap refill less than 2 seconds  Resp: Breathing non-labored, clear to auscultation bilaterally.  GI: Soft, non-distended.  Mild right upper quadrant tenderness, negative Richey, negative McBurney.  No rebound or guarding.  MSK/Extremities: No gross bony deformities. Moving all extremities  Skin: Warm. Appropriate color.  No jaundice  Neuro: Awake and Alert. Face symmetric.  Moving all extremities.    Medical Decision Making & ED Course   Medical Decision Makin-year-old male presenting to the ED for subacute right upper quadrant pain, sent in by outpatient PCP with concerns for jaundice and persistent right upper quadrant pain.  Some subjective shortness of breath intermittently.  Patient afebrile, nontoxic on arrival.  Vital stable, no scleral icterus or jaundice on exam.  Mild reproducible right upper quadrant tenderness.  Has had recent CT imaging and ultrasound which have been unremarkable.  Treated for suspected musculoskeletal strain with minimal improvement in symptoms.  DDx at this time including peptic ulcer versus duodenal ulcer, biliary colic/cholecystitis, hepatitis, cholangitis, choledocholithiasis, pancreatitis, lower lobe pneumonia, pulmonary infarct versus segmental PE, atypical chest pain.  Patient has had multiple presentations for similar complaint.  Broad workup obtained including labs with renal and hepatic function to rule out obstructive  biliary process.  BNP and Trope to consider cardiac etiology unremarkable, ECG nonischemic.  Patient underwent chest x-ray showing no focal findings, ultrasound gallbladder unremarkable.  Obtain D-dimer which was elevated therefore proceeded with CT angio chest to rule out PE as well as CT abdomen pelvis to rule out additional abdominal pathology.  Patient initially treated with morphine and Zofran, later given Protonix and BMX with improvement in symptomatology strengthening suspicion for potential ulcer.  CTs showing no acute emergent pathology.  Patient notified of results.  Will discharge with GI follow-up and Protonix prescription.  ----      Differential diagnoses considered include but are not limited to: peptic ulcer versus duodenal ulcer, biliary colic/cholecystitis, hepatitis, cholangitis, choledocholithiasis, pancreatitis, lower lobe pneumonia, pulmonary infarct versus segmental PE, atypical chest pain     Social Determinants of Health which Significantly Impact Care: None identified     EKG Independent Interpretation: See ED course for my independent interpretation if ECG was obtained.    Independent Result Review and Interpretation: Please see MDM and ED course for my independent interpretation of the results    Chronic conditions affecting the patient's care: Please see H&P and [facility]    The patient was discussed with the following consultants/services: None    Care Considerations: As document above in [facility]    ED Course:  ED Course as of 09/13/24 1927   Fri Sep 13, 2024   1409 EKG read by me reviewed by me is normal sinus rhythm at 62 bpm.  Normal axis.  Normal intervals.  No ST segment elevation or depression. [HD]   1445 Troponin I, High Sensitivity: <3 [KR]   1445 XR chest 1 view  No acute cardiopulmonary process. [KR]   1445 CBC and Auto Differential  No leukocytosis, anemia or thrombocytopenia. [KR]   1445 BNP: 30 [KR]   1458 LIPASE: 59 [KR]   1459 Hepatic function panel(!)  No transaminitis, no signs  of obstructive biliary process. [KR]   1459 Urinalysis with Reflex Culture and Microscopic(!)  No evidence of UTI. [KR]   1553 US gallbladder  Right upper quadrant ultrasound including kidney showing anechoic gallbladder with no stones, no pericholecystic fluid or wall thickening.  No evidence of hydronephrosis, no visible stones, no cysts on for right kidney. [KR]   1553 Consider duodenal ulcer contributing to symptoms given otherwise reassuring workup however will obtain dimer with consideration of PE versus distal segmental pulmonary infarct. [KR]   1615 Diffuse hepatic steatosis, otherwise unremarkable ultrasound of the  right upper quadrant.  No cholelithiasis, gallbladder wall thickening,  or biliary dilatation is appreciated.   [HD]   1635 D-Dimer, Quantitative VTE Exclusion(!): 1,014 [KR]   1842 CT angio chest for pulmonary embolism  No evidence of PE, no acute lung pathology.  Small hiatal hernia with possible reflux esophagitis. [KR]   1843 CT abdomen pelvis w IV contrast  CT abdomen with findings consistent with potential gastritis, recommending endoscopy.  Hepatic steatosis and mild hepatomegaly, tiny exophytic focus on the tip of the liver could be a focal lesion not seen previously, outpatient MRI follow-up recommended.  No bowel obstruction, no free air, no free fluid.  Diverticulosis without diverticulitis.  No evidence of acute appendicitis. [KR]   1926 Patient provided print out with incidental findings. [KR]      ED Course User Index  [HD] Brianne Blanca DO  [KR] Leanne Collier DO         Diagnoses as of 09/13/24 1927   Right upper quadrant abdominal pain     Disposition   As a result of the work-up, the patient was discharged home.  he was informed of his diagnosis and instructed to come back with any concerns or worsening of condition.  he and was agreeable to the plan as discussed above.  he was given the opportunity to ask questions.  All of the patient's questions were  answered.    Procedures   Procedures    Patient seen and discussed with attending physician    Leanne Collier DO  Emergency Medicine     Leanne Collier DO  Resident  09/13/24 1927

## 2024-09-13 NOTE — ED TRIAGE NOTES
Patient c/o RUQ abdominal pain with nausea for the past 9 weeks, patient was at his PCP today and sent to the ED for further evaluation.

## 2024-09-13 NOTE — PROGRESS NOTES
Subjective   Patient ID: Vincent P Lanese is a 63 y.o. male who presents for Abdominal Pain (Pt states possible gallbladder infection, as people he knows have had the same symptoms and had gallbladder issues//-Hasn't gotten better since last seen/-Starts from lower right rib and moves to back/-Constant pain and pressure that doesn't go away/-Trouble breathing sometimes).    Patient here with ongoing right upper quadrant pain.  Since last office visit it is now worsening.  He is having increased pain which is radiating up into his right shoulder as well.  He did see his sister yesterday, and she noted that his eyes and skin appeared yellow as compared to his normal.        Review of Systems   Constitutional:  Negative for chills, fatigue and fever.   HENT:  Negative for congestion, hearing loss, rhinorrhea, sinus pressure, sinus pain and tinnitus.    Eyes:  Negative for visual disturbance.   Respiratory:  Negative for cough, shortness of breath and wheezing.    Cardiovascular:  Negative for chest pain, palpitations and leg swelling.   Gastrointestinal:  Positive for abdominal pain. Negative for constipation, diarrhea, nausea and vomiting.   Endocrine: Negative for cold intolerance, heat intolerance, polydipsia and polyuria.   Genitourinary:  Negative for dysuria, frequency and urgency.   Musculoskeletal:  Negative for arthralgias and myalgias.   Skin:  Negative for pallor, rash and wound.   Neurological:  Negative for dizziness, light-headedness and headaches.   Psychiatric/Behavioral:  Negative for dysphoric mood and sleep disturbance. The patient is not nervous/anxious.        Objective     Visit Vitals  /68 (BP Location: Left arm)   Pulse 73   Temp 36.5 °C (97.7 °F) (Temporal)   Wt 87.5 kg (193 lb)   SpO2 95%   BMI 31.15 kg/m²   Smoking Status Every Day   BSA 2.02 m²         Physical Exam  Constitutional:       Appearance: Normal appearance. He is obese.   HENT:      Head: Normocephalic and atraumatic.       Right Ear: Tympanic membrane, ear canal and external ear normal.      Left Ear: Tympanic membrane, ear canal and external ear normal.      Nose: Nose normal.      Mouth/Throat:      Mouth: Mucous membranes are moist.      Pharynx: Oropharynx is clear.   Eyes:      General: No scleral icterus.     Extraocular Movements: Extraocular movements intact.      Conjunctiva/sclera: Conjunctivae normal.      Pupils: Pupils are equal, round, and reactive to light.   Cardiovascular:      Rate and Rhythm: Normal rate and regular rhythm.      Pulses: Normal pulses.      Heart sounds: Normal heart sounds.   Pulmonary:      Effort: Pulmonary effort is normal.      Breath sounds: Normal breath sounds.   Abdominal:      General: There is no distension.      Palpations: Abdomen is soft.      Tenderness: There is no abdominal tenderness.   Musculoskeletal:         General: Normal range of motion.   Skin:     General: Skin is warm and dry.      Coloration: Skin is jaundiced (Mildly jaundiced appearing).   Neurological:      Mental Status: He is alert and oriented to person, place, and time. Mental status is at baseline.   Psychiatric:         Mood and Affect: Mood normal.         Behavior: Behavior normal.         Assessment & Plan  Right upper quadrant abdominal pain       Patient now with different exam abdominal exam from prior.  Having some guarding and fullness in right upper quadrant on palpation now..  I do think patient needs a rapid evaluation with general surgery at this time.  He was advised to proceed to the emergency department for further evaluation.  He stated he will go to [facility].  Report was called to [facility].     Reviewed and approved by SELAM GRANT on 9/13/24 at 11:49 AM.

## 2024-09-14 LAB — HOLD SPECIMEN: NORMAL

## 2024-09-19 LAB
ATRIAL RATE: 62 BPM
P AXIS: 68 DEGREES
P OFFSET: 192 MS
P ONSET: 135 MS
PR INTERVAL: 162 MS
Q ONSET: 216 MS
QRS COUNT: 10 BEATS
QRS DURATION: 96 MS
QT INTERVAL: 396 MS
QTC CALCULATION(BAZETT): 401 MS
QTC FREDERICIA: 400 MS
R AXIS: 38 DEGREES
T AXIS: 54 DEGREES
T OFFSET: 414 MS
VENTRICULAR RATE: 62 BPM

## 2024-10-01 ENCOUNTER — TELEPHONE (OUTPATIENT)
Dept: PRIMARY CARE | Facility: CLINIC | Age: 64
End: 2024-10-01
Payer: COMMERCIAL

## 2024-10-01 NOTE — TELEPHONE ENCOUNTER
Patient would like a referral for a doctor that will take his insurance for an Endoscopy.    Patient can be reached at [phone].

## 2024-10-01 NOTE — TELEPHONE ENCOUNTER
Called patient and told him he needs to call his insurance company [employer] and get a list of doctors who will take his insurance and then call back if he wants LES's opinion for recommendation.

## 2024-10-03 ENCOUNTER — TELEPHONE (OUTPATIENT)
Dept: PRIMARY CARE | Facility: CLINIC | Age: 64
End: 2024-10-03
Payer: COMMERCIAL

## 2024-10-03 NOTE — TELEPHONE ENCOUNTER
Patient would like a call regarding his ER visit, to know when he should follow-up with FABY, and the medication that the ER prescribed if he can go up to a higher dosage.  This was the pantoprazole.    Patient is out of muscle relaxer that TASHI prescribed, and would like a refill on Zanaflex as this was the only thing that gave him relief.    Pharmacy: [facility] in [place]    Please Advise: [phone]

## 2024-10-07 DIAGNOSIS — K21.9 GASTROESOPHAGEAL REFLUX DISEASE WITHOUT ESOPHAGITIS: Primary | ICD-10-CM

## 2024-10-07 DIAGNOSIS — R10.11 ABDOMINAL WALL PAIN IN RIGHT UPPER QUADRANT: ICD-10-CM

## 2024-10-07 RX ORDER — TIZANIDINE 4 MG/1
4 TABLET ORAL EVERY 8 HOURS PRN
Qty: 60 TABLET | Refills: 0 | Status: SHIPPED | OUTPATIENT
Start: 2024-10-07 | End: 2024-10-22

## 2024-10-07 RX ORDER — PANTOPRAZOLE SODIUM 40 MG/1
40 TABLET, DELAYED RELEASE ORAL DAILY
Qty: 30 TABLET | Refills: 1 | Status: SHIPPED | OUTPATIENT
Start: 2024-10-07 | End: 2024-12-06

## 2024-11-08 ENCOUNTER — TELEPHONE (OUTPATIENT)
Dept: PRIMARY CARE | Facility: CLINIC | Age: 64
End: 2024-11-08
Payer: COMMERCIAL

## 2024-11-08 NOTE — TELEPHONE ENCOUNTER
Pt called he spoke to the referral line and his insurance he cannot fine anyone that does endoscopies or take his insurance [employer] his ulcer is getting worse his  urine is reddish color he has red blood in his stool today he cannot see a GI Dr until January what can he do

## 2024-11-12 ENCOUNTER — TELEPHONE (OUTPATIENT)
Dept: PRIMARY CARE | Facility: CLINIC | Age: 64
End: 2024-11-12
Payer: COMMERCIAL

## 2024-11-12 NOTE — TELEPHONE ENCOUNTER
Patient called [phone]  He states he has been in pain for 4 months and can't breathe at night. His endoscopy is scheduled for the 27th, however, he is looking for some relief for the stomach ulcer before that. Please advise.

## 2024-11-19 DIAGNOSIS — K21.9 GASTROESOPHAGEAL REFLUX DISEASE WITHOUT ESOPHAGITIS: ICD-10-CM

## 2024-11-19 RX ORDER — PANTOPRAZOLE SODIUM 40 MG/1
40 TABLET, DELAYED RELEASE ORAL 2 TIMES DAILY
Qty: 60 TABLET | Refills: 1 | Status: SHIPPED | OUTPATIENT
Start: 2024-11-19 | End: 2024-12-19

## 2024-11-21 DIAGNOSIS — F41.9 CHRONIC ANXIETY: ICD-10-CM

## 2024-11-21 RX ORDER — PAROXETINE HYDROCHLORIDE 20 MG/1
20 TABLET, FILM COATED ORAL DAILY
Qty: 90 TABLET | Refills: 0 | Status: SHIPPED | OUTPATIENT
Start: 2024-11-21

## 2024-11-24 ENCOUNTER — ANESTHESIA EVENT (OUTPATIENT)
Dept: OPERATING ROOM | Facility: HOSPITAL | Age: 64
End: 2024-11-24
Payer: COMMERCIAL

## 2024-11-25 ENCOUNTER — HOSPITAL ENCOUNTER (OUTPATIENT)
Dept: OPERATING ROOM | Facility: HOSPITAL | Age: 64
Setting detail: OUTPATIENT SURGERY
Discharge: HOME | End: 2024-11-25
Payer: COMMERCIAL

## 2024-11-25 ENCOUNTER — ANESTHESIA (OUTPATIENT)
Dept: OPERATING ROOM | Facility: HOSPITAL | Age: 64
End: 2024-11-25
Payer: COMMERCIAL

## 2024-11-25 VITALS
DIASTOLIC BLOOD PRESSURE: 67 MMHG | RESPIRATION RATE: 15 BRPM | HEART RATE: 66 BPM | WEIGHT: 189.6 LBS | TEMPERATURE: 97.9 F | BODY MASS INDEX: 30.47 KG/M2 | HEIGHT: 66 IN | OXYGEN SATURATION: 99 % | SYSTOLIC BLOOD PRESSURE: 140 MMHG

## 2024-11-25 DIAGNOSIS — K92.1 MELENA: Primary | ICD-10-CM

## 2024-11-25 PROCEDURE — 3600000007 HC OR TIME - EACH INCREMENTAL 1 MINUTE - PROCEDURE LEVEL TWO: Performed by: ANESTHESIOLOGY

## 2024-11-25 PROCEDURE — 2500000005 HC RX 250 GENERAL PHARMACY W/O HCPCS: Performed by: NURSE ANESTHETIST, CERTIFIED REGISTERED

## 2024-11-25 PROCEDURE — 7100000010 HC PHASE TWO TIME - EACH INCREMENTAL 1 MINUTE: Performed by: ANESTHESIOLOGY

## 2024-11-25 PROCEDURE — 7100000009 HC PHASE TWO TIME - INITIAL BASE CHARGE: Performed by: ANESTHESIOLOGY

## 2024-11-25 PROCEDURE — 3700000002 HC GENERAL ANESTHESIA TIME - EACH INCREMENTAL 1 MINUTE: Performed by: ANESTHESIOLOGY

## 2024-11-25 PROCEDURE — 3700000001 HC GENERAL ANESTHESIA TIME - INITIAL BASE CHARGE: Performed by: ANESTHESIOLOGY

## 2024-11-25 PROCEDURE — A43239 PR EDG TRANSORAL BIOPSY SINGLE/MULTIPLE: Performed by: ANESTHESIOLOGY

## 2024-11-25 PROCEDURE — A43239 PR EDG TRANSORAL BIOPSY SINGLE/MULTIPLE: Performed by: NURSE ANESTHETIST, CERTIFIED REGISTERED

## 2024-11-25 PROCEDURE — 2500000004 HC RX 250 GENERAL PHARMACY W/ HCPCS (ALT 636 FOR OP/ED): Performed by: NURSE ANESTHETIST, CERTIFIED REGISTERED

## 2024-11-25 PROCEDURE — 43239 EGD BIOPSY SINGLE/MULTIPLE: CPT | Performed by: SURGERY

## 2024-11-25 PROCEDURE — 3600000002 HC OR TIME - INITIAL BASE CHARGE - PROCEDURE LEVEL TWO: Performed by: ANESTHESIOLOGY

## 2024-11-25 RX ORDER — SODIUM CHLORIDE 0.9 % (FLUSH) 0.9 %
SYRINGE (ML) INJECTION AS NEEDED
Status: DISCONTINUED | OUTPATIENT
Start: 2024-11-25 | End: 2024-11-25

## 2024-11-25 RX ORDER — ONDANSETRON HYDROCHLORIDE 2 MG/ML
8 INJECTION, SOLUTION INTRAVENOUS ONCE
Status: DISCONTINUED | OUTPATIENT
Start: 2024-11-25 | End: 2024-11-26 | Stop reason: HOSPADM

## 2024-11-25 RX ORDER — MIDAZOLAM HYDROCHLORIDE 1 MG/ML
INJECTION INTRAMUSCULAR; INTRAVENOUS AS NEEDED
Status: DISCONTINUED | OUTPATIENT
Start: 2024-11-25 | End: 2024-11-25

## 2024-11-25 RX ORDER — ALBUTEROL SULFATE 0.83 MG/ML
2.5 SOLUTION RESPIRATORY (INHALATION) ONCE AS NEEDED
Status: DISCONTINUED | OUTPATIENT
Start: 2024-11-25 | End: 2024-11-26 | Stop reason: HOSPADM

## 2024-11-25 RX ORDER — PROPOFOL 10 MG/ML
INJECTION, EMULSION INTRAVENOUS AS NEEDED
Status: DISCONTINUED | OUTPATIENT
Start: 2024-11-25 | End: 2024-11-25

## 2024-11-25 RX ORDER — LIDOCAINE HYDROCHLORIDE 20 MG/ML
SOLUTION OROPHARYNGEAL AS NEEDED
Status: DISCONTINUED | OUTPATIENT
Start: 2024-11-25 | End: 2024-11-25

## 2024-11-25 RX ORDER — ACETAMINOPHEN 325 MG/1
650 TABLET ORAL EVERY 4 HOURS PRN
Status: DISCONTINUED | OUTPATIENT
Start: 2024-11-25 | End: 2024-11-26 | Stop reason: HOSPADM

## 2024-11-25 RX ORDER — SUCRALFATE 1 G/1
1 TABLET ORAL
Qty: 56 TABLET | Refills: 0 | Status: SHIPPED | OUTPATIENT
Start: 2024-11-25 | End: 2024-12-09

## 2024-11-25 RX ORDER — LIDOCAINE HYDROCHLORIDE 10 MG/ML
0.1 INJECTION, SOLUTION EPIDURAL; INFILTRATION; INTRACAUDAL; PERINEURAL ONCE
Status: DISCONTINUED | OUTPATIENT
Start: 2024-11-25 | End: 2024-11-26 | Stop reason: HOSPADM

## 2024-11-25 RX ORDER — LORATADINE 10 MG/1
10 TABLET ORAL DAILY
COMMUNITY

## 2024-11-25 SDOH — HEALTH STABILITY: MENTAL HEALTH: CURRENT SMOKER: 1

## 2024-11-25 ASSESSMENT — PAIN SCALES - GENERAL
PAINLEVEL_OUTOF10: 0 - NO PAIN
PAINLEVEL_OUTOF10: 7
PAINLEVEL_OUTOF10: 0 - NO PAIN

## 2024-11-25 ASSESSMENT — PAIN - FUNCTIONAL ASSESSMENT
PAIN_FUNCTIONAL_ASSESSMENT: 0-10

## 2024-11-25 NOTE — ANESTHESIA PREPROCEDURE EVALUATION
"Patient: Vincent P Lanese \"Bill\"    Procedure Information       Date/Time: 11/25/24 1200    Scheduled providers: Ronaldo Giordano MD; Govind Downing MD    Procedure: EGD    Location: [facility] OR            Relevant Problems   Cardiac   (+) Combined hyperlipidemia      Neuro   (+) Chronic anxiety      /Renal   (+) Benign prostatic hyperplasia      Musculoskeletal   (+) DJD of right AC (acromioclavicular) joint   (+) Generalized osteoarthritis   (+) Osteoarthritis of spine with radiculopathy, cervical region      HEENT   (+) Seasonal allergies       Clinical information reviewed:    Allergies  Meds               NPO Detail:  No data recorded     Physical Exam    Airway  Mallampati: II  TM distance: >3 FB  Neck ROM: full     Cardiovascular - normal exam  Rhythm: regular  Rate: normal     Dental - normal exam     Pulmonary - normal exam     Abdominal - normal exam             Anesthesia Plan    History of general anesthesia?: yes  History of complications of general anesthesia?: no    ASA 3     MAC     The patient is a current smoker.  Patient was previously instructed to abstain from smoking on day of procedure.  Patient smoked on day of procedure.    intravenous induction   Anesthetic plan and risks discussed with patient.  Use of blood products discussed with patient who consented to blood products.    Plan discussed with attending.      "

## 2024-11-25 NOTE — ANESTHESIA POSTPROCEDURE EVALUATION
"Patient: Vincent P Lanese \"Bill\"    Procedure Summary       Date: 11/25/24 Room / Location: [facility] OR    Anesthesia Start: 1141 Anesthesia Stop: 1209    Procedure: EGD Diagnosis: Melena    Scheduled Providers: Ronaldo Giordano MD; Govind Downing MD Responsible Provider: Govind Downing MD    Anesthesia Type: MAC ASA Status: 3            Anesthesia Type: MAC    Vitals Value Taken Time   /92 11/25/24 1210   Temp 36.4 11/25/24 1210   Pulse 72 11/25/24 1210   Resp 16 11/25/24 1210   SpO2 99 11/25/24 1210       Anesthesia Post Evaluation    Patient location during evaluation: PACU  Patient participation: complete - patient participated  Level of consciousness: awake  Pain management: adequate  Airway patency: patent  Cardiovascular status: acceptable  Respiratory status: acceptable  Hydration status: acceptable  Postoperative Nausea and Vomiting: none    132/92    No notable events documented.    "

## 2024-11-25 NOTE — H&P
General Surgery History and Physical    Referring Provider:  MD Zach Wilson John M, DO     Chief Complaint:  Colonoscopy    History of Present Illness:  This is a 64 y.o. male who presents for a egd.    Past Medical History:  Past Medical History:   Diagnosis Date    Anxiety     Class 1 obesity with body mass index (BMI) of 31.0 to 31.9 in adult 09/13/2024    Hyperlipidemia     Insomnia     Melena     Tick bite of left thigh, sequela 09/04/2024        Past Surgical History:  Past Surgical History:   Procedure Laterality Date    COLONOSCOPY  10/26/2021    COLONOSCOPY  01/01/2014        Medications:  Current Outpatient Medications   Medication Instructions    albuterol (ProAir HFA) 90 mcg/actuation inhaler 1-2 puffs, inhalation, Every 6 hours PRN    cetirizine (ZYRTEC) 10 mg, oral, Daily    cholecalciferol (VITAMIN D-3) 2,000 Units, oral, Daily    fluticasone (Flonase) 50 mcg/actuation nasal spray 1 spray, Each Nostril, Daily, Shake gently. Before first use, prime pump. After use, clean tip and replace cap.    fluticasone (Flovent HFA) 220 mcg/actuation inhaler 2 puffs, inhalation, 2 times daily    fluticasone furoate (Arnuity Ellipta) 100 mcg/actuation inhaler 1 puff, inhalation, Daily, Rinse mouth with water after use to reduce aftertaste and incidence of candidiasis. Do not swallow.    pantoprazole (PROTONIX) 40 mg, oral, 2 times daily, Do not crush, chew, or split.    PARoxetine (PAXIL) 20 mg, oral, Daily    tamsulosin (Flomax) 0.4 mg 24 hr capsule 1 capsule, oral, Daily    tiZANidine (ZANAFLEX) 4 mg, oral, Every 8 hours PRN        Allergies:  Allergies   Allergen Reactions    Ketorolac Other     GI Upset         Family History:  No family history on file.     Social History:  Social History     Socioeconomic History    Marital status:    Tobacco Use    Smoking status: Every Day     Current packs/day: 1.00     Average packs/day: 1 pack/day for 10.0 years (10.0 ttl pk-yrs)     Types:  "Cigarettes     Passive exposure: Current    Smokeless tobacco: Never   Vaping Use    Vaping status: Never Used   Substance and Sexual Activity    Alcohol use: Not Currently    Drug use: Never        Review of Systems:  A complete 12 point review of systems was performed and is negative except as noted in the history of present illness.      Vital Signs:  Vitals:    11/25/24 1054   BP: (!) 132/92   Pulse: 62   Resp: 18   Temp: 36.4 °C (97.5 °F)   SpO2: 100%          Physical Exam:  General: No acute distress. Sitting up in bed.   Neuro: Alert and oriented ×3. Follows commands.  Head: Atraumatic  Eyes: Pupils equal reactive to light. Extraocular motions intact.  Ears: Hears normal speaking voice.  Mouth, Nose, Throat: Mucous membranes moist.  Normal dentition.  Neck: Supple. No appreciable masses.  Chest: No crepitus.    Heart: Regular rate and rhythm.  Lung: Clear to auscultation bilaterally.  Vascular: No carotid bruits.  Palpable radial pulses bilaterally.  Abdomen: Soft. Nondistended. Nontender.    Musculoskeletal: Moves all extremities.  Normal range of motion.  Lymphatic: No palpable lymph nodes.  Skin: No rashes or lesions.  Psychological: Normal affect      Laboratory Values:  CBC: No results found for: \"WBC\", \"RBC\", \"HGB\", \"HCT\", \"PLT\"    RFP: No results found for: \"GLUF\", \"NA\", \"K\", \"CL\", \"CO2\", \"BUN\", \"CREATININE\", \"CALCIUM\", \"MG\", \"PHOS\"     LFTs: No results found for: \"PROT\", \"ALBUMIN\", \"BILITOT\", \"BILIDIR\", \"ALKPHOS\", \"AST\", \"ALT\"         Assessment:  This is a 64 y.o. male who presents for a egd.      Plan:  -- egd.      Brian Giordano MD  General Surgery  Office: [phone]  Fax:     [phone]  11:28 AM   11/25/24     "

## 2024-11-25 NOTE — DISCHARGE INSTRUCTIONS

## 2024-11-27 ENCOUNTER — APPOINTMENT (OUTPATIENT)
Dept: OPERATING ROOM | Facility: HOSPITAL | Age: 64
End: 2024-11-27
Payer: COMMERCIAL

## 2024-12-04 LAB
LABORATORY COMMENT REPORT: NORMAL
PATH REPORT.FINAL DX SPEC: NORMAL
PATH REPORT.GROSS SPEC: NORMAL
PATH REPORT.TOTAL CANCER: NORMAL

## 2024-12-06 ENCOUNTER — OFFICE VISIT (OUTPATIENT)
Dept: URGENT CARE | Age: 64
End: 2024-12-06
Payer: COMMERCIAL

## 2024-12-06 VITALS
WEIGHT: 190.04 LBS | DIASTOLIC BLOOD PRESSURE: 79 MMHG | TEMPERATURE: 97.9 F | SYSTOLIC BLOOD PRESSURE: 110 MMHG | RESPIRATION RATE: 18 BRPM | OXYGEN SATURATION: 98 % | HEART RATE: 80 BPM | BODY MASS INDEX: 30.67 KG/M2

## 2024-12-06 DIAGNOSIS — R11.2 NAUSEA AND VOMITING, UNSPECIFIED VOMITING TYPE: ICD-10-CM

## 2024-12-06 DIAGNOSIS — Z87.11 HISTORY OF PEPTIC ULCER: ICD-10-CM

## 2024-12-06 DIAGNOSIS — R31.9 HEMATURIA, UNSPECIFIED TYPE: Primary | ICD-10-CM

## 2024-12-06 LAB
POC APPEARANCE, URINE: CLEAR
POC BILIRUBIN, URINE: NEGATIVE
POC BLOOD, URINE: ABNORMAL
POC COLOR, URINE: ABNORMAL
POC GLUCOSE, URINE: NEGATIVE MG/DL
POC KETONES, URINE: NEGATIVE MG/DL
POC LEUKOCYTES, URINE: NEGATIVE
POC NITRITE,URINE: NEGATIVE
POC PH, URINE: 6 PH
POC PROTEIN, URINE: ABNORMAL MG/DL
POC SPECIFIC GRAVITY, URINE: 1.02
POC UROBILINOGEN, URINE: 0.2 EU/DL

## 2024-12-06 RX ORDER — ONDANSETRON 4 MG/1
4 TABLET, ORALLY DISINTEGRATING ORAL EVERY 8 HOURS PRN
Qty: 12 TABLET | Refills: 0 | Status: ON HOLD | OUTPATIENT
Start: 2024-12-06 | End: 2024-12-10
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
## 2024-12-06 NOTE — PATIENT INSTRUCTIONS
Take zofran for nausea and vomiting.  Monitor symptoms today.   If symptoms do not improve today go to emergency department for new or different abdominal pain, persistent vomiting, blood in vomit or stool.  Increase water intake.  Go to emergency department for bright red blood in urine, inability to urinate or any other new or worsening symptoms.     Follow up with urology regarding blood in urine.

## 2024-12-07 ENCOUNTER — APPOINTMENT (OUTPATIENT)
Dept: RADIOLOGY | Facility: HOSPITAL | Age: 64
End: 2024-12-07
Payer: COMMERCIAL

## 2024-12-07 ENCOUNTER — HOSPITAL ENCOUNTER (OUTPATIENT)
Facility: HOSPITAL | Age: 64
Setting detail: OBSERVATION
End: 2024-12-07
Attending: STUDENT IN AN ORGANIZED HEALTH CARE EDUCATION/TRAINING PROGRAM | Admitting: FAMILY MEDICINE
Payer: COMMERCIAL

## 2024-12-07 ENCOUNTER — APPOINTMENT (OUTPATIENT)
Dept: CARDIOLOGY | Facility: HOSPITAL | Age: 64
End: 2024-12-07
Payer: COMMERCIAL

## 2024-12-07 DIAGNOSIS — R31.9 HEMATURIA, UNSPECIFIED TYPE: ICD-10-CM

## 2024-12-07 DIAGNOSIS — K92.2 GASTROINTESTINAL HEMORRHAGE, UNSPECIFIED GASTROINTESTINAL HEMORRHAGE TYPE: Primary | ICD-10-CM

## 2024-12-07 DIAGNOSIS — K62.5 RECTAL BLEEDING: ICD-10-CM

## 2024-12-07 DIAGNOSIS — K29.71 GASTROINTESTINAL HEMORRHAGE ASSOCIATED WITH GASTRITIS, UNSPECIFIED GASTRITIS TYPE: ICD-10-CM

## 2024-12-07 LAB
ABO GROUP (TYPE) IN BLOOD: NORMAL
ALBUMIN SERPL BCP-MCNC: 4.7 G/DL (ref 3.4–5)
ALP SERPL-CCNC: 87 U/L (ref 33–136)
ALT SERPL W P-5'-P-CCNC: 30 U/L (ref 10–52)
ANION GAP SERPL CALCULATED.3IONS-SCNC: 12 MMOL/L (ref 10–20)
ANTIBODY SCREEN: NORMAL
APPEARANCE UR: CLEAR
AST SERPL W P-5'-P-CCNC: 18 U/L (ref 9–39)
BASOPHILS # BLD AUTO: 0.02 X10*3/UL (ref 0–0.1)
BASOPHILS NFR BLD AUTO: 0.4 %
BILIRUB SERPL-MCNC: 0.6 MG/DL (ref 0–1.2)
BILIRUB UR STRIP.AUTO-MCNC: NEGATIVE MG/DL
BUN SERPL-MCNC: 16 MG/DL (ref 6–23)
CALCIUM SERPL-MCNC: 9.5 MG/DL (ref 8.6–10.3)
CHLORIDE SERPL-SCNC: 103 MMOL/L (ref 98–107)
CO2 SERPL-SCNC: 28 MMOL/L (ref 21–32)
COLOR UR: YELLOW
CREAT SERPL-MCNC: 1.05 MG/DL (ref 0.5–1.3)
EGFRCR SERPLBLD CKD-EPI 2021: 80 ML/MIN/1.73M*2
EOSINOPHIL # BLD AUTO: 0.09 X10*3/UL (ref 0–0.7)
EOSINOPHIL NFR BLD AUTO: 1.6 %
ERYTHROCYTE [DISTWIDTH] IN BLOOD BY AUTOMATED COUNT: 12.4 % (ref 11.5–14.5)
GLUCOSE SERPL-MCNC: 107 MG/DL (ref 74–99)
GLUCOSE UR STRIP.AUTO-MCNC: NORMAL MG/DL
HAPTOGLOB SERPL NEPH-MCNC: 197 MG/DL (ref 30–200)
HCT VFR BLD AUTO: 41.7 % (ref 41–52)
HCT VFR BLD AUTO: 44.4 % (ref 41–52)
HEMOCCULT SP1 STL QL: POSITIVE
HGB BLD-MCNC: 14.2 G/DL (ref 13.5–17.5)
HGB BLD-MCNC: 15 G/DL (ref 13.5–17.5)
IMM GRANULOCYTES # BLD AUTO: 0.02 X10*3/UL (ref 0–0.7)
IMM GRANULOCYTES NFR BLD AUTO: 0.4 % (ref 0–0.9)
INR PPP: 1 (ref 0.9–1.2)
KETONES UR STRIP.AUTO-MCNC: NEGATIVE MG/DL
LDH SERPL L TO P-CCNC: 135 U/L (ref 84–246)
LEUKOCYTE ESTERASE UR QL STRIP.AUTO: NEGATIVE
LYMPHOCYTES # BLD AUTO: 1.76 X10*3/UL (ref 1.2–4.8)
LYMPHOCYTES NFR BLD AUTO: 31.5 %
MCH RBC QN AUTO: 29.7 PG (ref 26–34)
MCHC RBC AUTO-ENTMCNC: 33.8 G/DL (ref 32–36)
MCV RBC AUTO: 88 FL (ref 80–100)
MONOCYTES # BLD AUTO: 0.65 X10*3/UL (ref 0.1–1)
MONOCYTES NFR BLD AUTO: 11.6 %
MUCOUS THREADS #/AREA URNS AUTO: NORMAL /LPF
NEUTROPHILS # BLD AUTO: 3.05 X10*3/UL (ref 1.2–7.7)
NEUTROPHILS NFR BLD AUTO: 54.5 %
NITRITE UR QL STRIP.AUTO: NEGATIVE
NRBC BLD-RTO: 0 /100 WBCS (ref 0–0)
PH UR STRIP.AUTO: 5 [PH]
PLATELET # BLD AUTO: 246 X10*3/UL (ref 150–450)
POTASSIUM SERPL-SCNC: 3.7 MMOL/L (ref 3.5–5.3)
PROT SERPL-MCNC: 7.1 G/DL (ref 6.4–8.2)
PROT UR STRIP.AUTO-MCNC: NEGATIVE MG/DL
PROTHROMBIN TIME: 10.6 SECONDS (ref 9.3–12.7)
RBC # BLD AUTO: 5.05 X10*6/UL (ref 4.5–5.9)
RBC # UR STRIP.AUTO: ABNORMAL /UL
RBC #/AREA URNS AUTO: NORMAL /HPF
RH FACTOR (ANTIGEN D): NORMAL
SODIUM SERPL-SCNC: 139 MMOL/L (ref 136–145)
SP GR UR STRIP.AUTO: 1.02
UROBILINOGEN UR STRIP.AUTO-MCNC: NORMAL MG/DL
WBC # BLD AUTO: 5.6 X10*3/UL (ref 4.4–11.3)
WBC #/AREA URNS AUTO: NORMAL /HPF

## 2024-12-07 PROCEDURE — 86901 BLOOD TYPING SEROLOGIC RH(D): CPT | Performed by: CLINICAL NURSE SPECIALIST

## 2024-12-07 PROCEDURE — 85610 PROTHROMBIN TIME: CPT | Performed by: CLINICAL NURSE SPECIALIST

## 2024-12-07 PROCEDURE — 2500000004 HC RX 250 GENERAL PHARMACY W/ HCPCS (ALT 636 FOR OP/ED)

## 2024-12-07 PROCEDURE — 99285 EMERGENCY DEPT VISIT HI MDM: CPT | Mod: 25 | Performed by: STUDENT IN AN ORGANIZED HEALTH CARE EDUCATION/TRAINING PROGRAM

## 2024-12-07 PROCEDURE — 85014 HEMATOCRIT: CPT | Performed by: FAMILY MEDICINE

## 2024-12-07 PROCEDURE — 93005 ELECTROCARDIOGRAM TRACING: CPT

## 2024-12-07 PROCEDURE — 82270 OCCULT BLOOD FECES: CPT | Performed by: CLINICAL NURSE SPECIALIST

## 2024-12-07 PROCEDURE — 96374 THER/PROPH/DIAG INJ IV PUSH: CPT

## 2024-12-07 PROCEDURE — 85025 COMPLETE CBC W/AUTO DIFF WBC: CPT | Performed by: CLINICAL NURSE SPECIALIST

## 2024-12-07 PROCEDURE — 2500000001 HC RX 250 WO HCPCS SELF ADMINISTERED DRUGS (ALT 637 FOR MEDICARE OP): Performed by: FAMILY MEDICINE

## 2024-12-07 PROCEDURE — 2500000002 HC RX 250 W HCPCS SELF ADMINISTERED DRUGS (ALT 637 FOR MEDICARE OP, ALT 636 FOR OP/ED)

## 2024-12-07 PROCEDURE — 36415 COLL VENOUS BLD VENIPUNCTURE: CPT | Performed by: FAMILY MEDICINE

## 2024-12-07 PROCEDURE — 84075 ASSAY ALKALINE PHOSPHATASE: CPT | Performed by: CLINICAL NURSE SPECIALIST

## 2024-12-07 PROCEDURE — 83010 ASSAY OF HAPTOGLOBIN QUANT: CPT | Mod: TRILAB | Performed by: CLINICAL NURSE SPECIALIST

## 2024-12-07 PROCEDURE — 93010 ELECTROCARDIOGRAM REPORT: CPT | Performed by: INTERNAL MEDICINE

## 2024-12-07 PROCEDURE — 2500000002 HC RX 250 W HCPCS SELF ADMINISTERED DRUGS (ALT 637 FOR MEDICARE OP, ALT 636 FOR OP/ED): Performed by: FAMILY MEDICINE

## 2024-12-07 PROCEDURE — 81001 URINALYSIS AUTO W/SCOPE: CPT | Performed by: CLINICAL NURSE SPECIALIST

## 2024-12-07 PROCEDURE — 74177 CT ABD & PELVIS W/CONTRAST: CPT | Performed by: RADIOLOGY

## 2024-12-07 PROCEDURE — 2550000001 HC RX 255 CONTRASTS: Performed by: CLINICAL NURSE SPECIALIST

## 2024-12-07 PROCEDURE — 99223 1ST HOSP IP/OBS HIGH 75: CPT | Performed by: FAMILY MEDICINE

## 2024-12-07 PROCEDURE — 36415 COLL VENOUS BLD VENIPUNCTURE: CPT | Performed by: CLINICAL NURSE SPECIALIST

## 2024-12-07 PROCEDURE — 74177 CT ABD & PELVIS W/CONTRAST: CPT

## 2024-12-07 PROCEDURE — G0378 HOSPITAL OBSERVATION PER HR: HCPCS

## 2024-12-07 PROCEDURE — 86900 BLOOD TYPING SEROLOGIC ABO: CPT | Performed by: CLINICAL NURSE SPECIALIST

## 2024-12-07 PROCEDURE — 83615 LACTATE (LD) (LDH) ENZYME: CPT | Performed by: CLINICAL NURSE SPECIALIST

## 2024-12-07 RX ORDER — ALBUTEROL SULFATE 0.83 MG/ML
3 SOLUTION RESPIRATORY (INHALATION) EVERY 6 HOURS PRN
Status: ACTIVE | OUTPATIENT
Start: 2024-12-07

## 2024-12-07 RX ORDER — PANTOPRAZOLE SODIUM 40 MG/10ML
40 INJECTION, POWDER, LYOPHILIZED, FOR SOLUTION INTRAVENOUS 2 TIMES DAILY
Status: DISCONTINUED | OUTPATIENT
Start: 2024-12-07 | End: 2024-12-07

## 2024-12-07 RX ORDER — TAMSULOSIN HYDROCHLORIDE 0.4 MG/1
0.4 CAPSULE ORAL 2 TIMES DAILY
Status: DISCONTINUED | OUTPATIENT
Start: 2024-12-07 | End: 2024-12-07

## 2024-12-07 RX ORDER — TAMSULOSIN HYDROCHLORIDE 0.4 MG/1
0.4 CAPSULE ORAL 2 TIMES DAILY
Status: DISPENSED | OUTPATIENT
Start: 2024-12-07

## 2024-12-07 RX ORDER — CETIRIZINE HYDROCHLORIDE 10 MG/1
10 TABLET ORAL DAILY
Status: DISPENSED | OUTPATIENT
Start: 2024-12-07

## 2024-12-07 RX ORDER — ONDANSETRON 4 MG/1
4 TABLET, ORALLY DISINTEGRATING ORAL EVERY 8 HOURS PRN
Status: ACTIVE | OUTPATIENT
Start: 2024-12-07

## 2024-12-07 RX ORDER — PANTOPRAZOLE SODIUM 40 MG/10ML
40 INJECTION, POWDER, LYOPHILIZED, FOR SOLUTION INTRAVENOUS 2 TIMES DAILY
Status: DISPENSED | OUTPATIENT
Start: 2024-12-07

## 2024-12-07 RX ORDER — SUCRALFATE 1 G/1
1 TABLET ORAL
Status: DISPENSED | OUTPATIENT
Start: 2024-12-07

## 2024-12-07 RX ORDER — PAROXETINE HYDROCHLORIDE 20 MG/1
20 TABLET, FILM COATED ORAL DAILY
Status: DISPENSED | OUTPATIENT
Start: 2024-12-07

## 2024-12-07 RX ORDER — FLUTICASONE PROPIONATE 50 MCG
1 SPRAY, SUSPENSION (ML) NASAL DAILY
Status: DISPENSED | OUTPATIENT
Start: 2024-12-07

## 2024-12-07 RX ORDER — TIZANIDINE 4 MG/1
4 TABLET ORAL EVERY 8 HOURS PRN
Status: ACTIVE | OUTPATIENT
Start: 2024-12-07

## 2024-12-07 RX ORDER — TAMSULOSIN HYDROCHLORIDE 0.4 MG/1
0.4 CAPSULE ORAL DAILY
Status: DISCONTINUED | OUTPATIENT
Start: 2024-12-07 | End: 2024-12-07

## 2024-12-07 RX ADMIN — IOHEXOL 75 ML: 350 INJECTION, SOLUTION INTRAVENOUS at 13:27

## 2024-12-07 RX ADMIN — PANTOPRAZOLE SODIUM 40 MG: 40 INJECTION, POWDER, FOR SOLUTION INTRAVENOUS at 17:55

## 2024-12-07 RX ADMIN — SUCRALFATE 1 G: 1 TABLET ORAL at 17:46

## 2024-12-07 RX ADMIN — CETIRIZINE HYDROCHLORIDE 10 MG: 10 TABLET, FILM COATED ORAL at 17:46

## 2024-12-07 RX ADMIN — SUCRALFATE 1 G: 1 TABLET ORAL at 20:29

## 2024-12-07 RX ADMIN — TAMSULOSIN HYDROCHLORIDE 0.4 MG: 0.4 CAPSULE ORAL at 17:46

## 2024-12-07 RX ADMIN — PAROXETINE HYDROCHLORIDE 20 MG: 20 TABLET, FILM COATED ORAL at 17:46

## 2024-12-07 SDOH — SOCIAL STABILITY: SOCIAL INSECURITY: ABUSE: ADULT

## 2024-12-07 SDOH — SOCIAL STABILITY: SOCIAL INSECURITY: HAVE YOU HAD THOUGHTS OF HARMING ANYONE ELSE?: NO

## 2024-12-07 SDOH — SOCIAL STABILITY: SOCIAL INSECURITY: HAVE YOU HAD ANY THOUGHTS OF HARMING ANYONE ELSE?: NO

## 2024-12-07 SDOH — SOCIAL STABILITY: SOCIAL INSECURITY: DO YOU FEEL ANYONE HAS EXPLOITED OR TAKEN ADVANTAGE OF YOU FINANCIALLY OR OF YOUR PERSONAL PROPERTY?: NO

## 2024-12-07 SDOH — SOCIAL STABILITY: SOCIAL INSECURITY: WERE YOU ABLE TO COMPLETE ALL THE BEHAVIORAL HEALTH SCREENINGS?: YES

## 2024-12-07 SDOH — SOCIAL STABILITY: SOCIAL INSECURITY: DOES ANYONE TRY TO KEEP YOU FROM HAVING/CONTACTING OTHER FRIENDS OR DOING THINGS OUTSIDE YOUR HOME?: NO

## 2024-12-07 SDOH — SOCIAL STABILITY: SOCIAL INSECURITY: DO YOU FEEL UNSAFE GOING BACK TO THE PLACE WHERE YOU ARE LIVING?: NO

## 2024-12-07 SDOH — SOCIAL STABILITY: SOCIAL INSECURITY: HAS ANYONE EVER THREATENED TO HURT YOUR FAMILY OR YOUR PETS?: NO

## 2024-12-07 SDOH — SOCIAL STABILITY: SOCIAL INSECURITY: ARE YOU OR HAVE YOU BEEN THREATENED OR ABUSED PHYSICALLY, EMOTIONALLY, OR SEXUALLY BY ANYONE?: NO

## 2024-12-07 SDOH — SOCIAL STABILITY: SOCIAL INSECURITY: ARE THERE ANY APPARENT SIGNS OF INJURIES/BEHAVIORS THAT COULD BE RELATED TO ABUSE/NEGLECT?: NO

## 2024-12-07 ASSESSMENT — LIFESTYLE VARIABLES
AUDIT-C TOTAL SCORE: 0
SUBSTANCE_ABUSE_PAST_12_MONTHS: YES
PRESCIPTION_ABUSE_PAST_12_MONTHS: NO
HOW OFTEN DO YOU HAVE A DRINK CONTAINING ALCOHOL: NEVER
SKIP TO QUESTIONS 9-10: 1
AUDIT-C TOTAL SCORE: 0
HOW MANY STANDARD DRINKS CONTAINING ALCOHOL DO YOU HAVE ON A TYPICAL DAY: PATIENT DOES NOT DRINK
HOW OFTEN DO YOU HAVE 6 OR MORE DRINKS ON ONE OCCASION: NEVER

## 2024-12-07 ASSESSMENT — ACTIVITIES OF DAILY LIVING (ADL)
TOILETING: INDEPENDENT
JUDGMENT_ADEQUATE_SAFELY_COMPLETE_DAILY_ACTIVITIES: YES
ADEQUATE_TO_COMPLETE_ADL: YES
BATHING: INDEPENDENT
WALKS IN HOME: INDEPENDENT
HEARING - RIGHT EAR: FUNCTIONAL
GROOMING: INDEPENDENT
HEARING - LEFT EAR: FUNCTIONAL
LACK_OF_TRANSPORTATION: NO
FEEDING YOURSELF: INDEPENDENT
PATIENT'S MEMORY ADEQUATE TO SAFELY COMPLETE DAILY ACTIVITIES?: YES
DRESSING YOURSELF: INDEPENDENT

## 2024-12-07 ASSESSMENT — COGNITIVE AND FUNCTIONAL STATUS - GENERAL
DAILY ACTIVITIY SCORE: 24
MOBILITY SCORE: 24
PATIENT BASELINE BEDBOUND: NO
MOBILITY SCORE: 24
DAILY ACTIVITIY SCORE: 24

## 2024-12-07 ASSESSMENT — PAIN SCALES - GENERAL
PAINLEVEL_OUTOF10: 7
PAINLEVEL_OUTOF10: 0 - NO PAIN

## 2024-12-07 ASSESSMENT — COLUMBIA-SUICIDE SEVERITY RATING SCALE - C-SSRS
6. HAVE YOU EVER DONE ANYTHING, STARTED TO DO ANYTHING, OR PREPARED TO DO ANYTHING TO END YOUR LIFE?: NO
1. IN THE PAST MONTH, HAVE YOU WISHED YOU WERE DEAD OR WISHED YOU COULD GO TO SLEEP AND NOT WAKE UP?: NO
2. HAVE YOU ACTUALLY HAD ANY THOUGHTS OF KILLING YOURSELF?: NO

## 2024-12-07 ASSESSMENT — PATIENT HEALTH QUESTIONNAIRE - PHQ9
2. FEELING DOWN, DEPRESSED OR HOPELESS: NOT AT ALL
SUM OF ALL RESPONSES TO PHQ9 QUESTIONS 1 & 2: 0
1. LITTLE INTEREST OR PLEASURE IN DOING THINGS: NOT AT ALL

## 2024-12-07 ASSESSMENT — PAIN - FUNCTIONAL ASSESSMENT: PAIN_FUNCTIONAL_ASSESSMENT: 0-10

## 2024-12-07 NOTE — H&P
History Of Present Illness  Patient is a 64-year-old male who presents emergency room for evaluation of rectal bleeding.  Patient reports that he has a history of recently diagnosed peptic ulcer disease and hiatal hernia.  States he has been taking Carafate, famotidine with improvement of his symptoms over the last week since 10 days since the endoscopy however started having blood in his urine yesterday as well as some epigastric pain and periumbilical pain.  He was seen in urgent care yesterday and was told if he had any rectal bleeding he should come in.  He states he had a very large bowel movement this morning which had an excessive amount of bright red blood as well as significant blood on the toilet paper.  Patient was concerned and came in for further evaluation.  He denies fever, chills, chest pain, shortness of breath.  Patient not on any blood thinners.     Rectal exam in the emergency room showed hemorrhoids and OB positive stool.  Hemoglobin 15.0.  The patient is admitted for further evaluation     Past Medical History  He has a past medical history of Anxiety, Class 1 obesity with body mass index (BMI) of 31.0 to 31.9 in adult (09/13/2024), Hyperlipidemia, Insomnia, Melena, and Tick bite of left thigh, sequela (09/04/2024).    Surgical History  He has a past surgical history that includes Colonoscopy (10/26/2021) and Colonoscopy (01/01/2014).     Social History  He reports that he has been smoking cigarettes. He has a 10 pack-year smoking history. He has been exposed to tobacco smoke. He has never used smokeless tobacco. He reports that he does not currently use alcohol. He reports that he does not use drugs.    Family History  No family history on file.     Allergies  Ketorolac    Review of Systems  As in history present illness, otherwise negative    Physical Exam  Alert oriented x 3  Lungs clear to auscultation bilaterally  Heart regular rhythm  Abdomen soft nontender  Extremities no leg edema  CNS  no focal deficit    Last Recorded Vitals  /79   Pulse 67   Temp 36.7 °C (98.1 °F)   Resp 16   Wt 85.1 kg (187 lb 9.8 oz)   SpO2 96%     Relevant Results  Results for orders placed or performed during the hospital encounter of 12/07/24 (from the past 24 hours)   CBC and Auto Differential   Result Value Ref Range    WBC 5.6 4.4 - 11.3 x10*3/uL    nRBC 0.0 0.0 - 0.0 /100 WBCs    RBC 5.05 4.50 - 5.90 x10*6/uL    Hemoglobin 15.0 13.5 - 17.5 g/dL    Hematocrit 44.4 41.0 - 52.0 %    MCV 88 80 - 100 fL    MCH 29.7 26.0 - 34.0 pg    MCHC 33.8 32.0 - 36.0 g/dL    RDW 12.4 11.5 - 14.5 %    Platelets 246 150 - 450 x10*3/uL    Neutrophils % 54.5 40.0 - 80.0 %    Immature Granulocytes %, Automated 0.4 0.0 - 0.9 %    Lymphocytes % 31.5 13.0 - 44.0 %    Monocytes % 11.6 2.0 - 10.0 %    Eosinophils % 1.6 0.0 - 6.0 %    Basophils % 0.4 0.0 - 2.0 %    Neutrophils Absolute 3.05 1.20 - 7.70 x10*3/uL    Immature Granulocytes Absolute, Automated 0.02 0.00 - 0.70 x10*3/uL    Lymphocytes Absolute 1.76 1.20 - 4.80 x10*3/uL    Monocytes Absolute 0.65 0.10 - 1.00 x10*3/uL    Eosinophils Absolute 0.09 0.00 - 0.70 x10*3/uL    Basophils Absolute 0.02 0.00 - 0.10 x10*3/uL   Comprehensive metabolic panel   Result Value Ref Range    Glucose 107 (H) 74 - 99 mg/dL    Sodium 139 136 - 145 mmol/L    Potassium 3.7 3.5 - 5.3 mmol/L    Chloride 103 98 - 107 mmol/L    Bicarbonate 28 21 - 32 mmol/L    Anion Gap 12 10 - 20 mmol/L    Urea Nitrogen 16 6 - 23 mg/dL    Creatinine 1.05 0.50 - 1.30 mg/dL    eGFR 80 >60 mL/min/1.73m*2    Calcium 9.5 8.6 - 10.3 mg/dL    Albumin 4.7 3.4 - 5.0 g/dL    Alkaline Phosphatase 87 33 - 136 U/L    Total Protein 7.1 6.4 - 8.2 g/dL    AST 18 9 - 39 U/L    Bilirubin, Total 0.6 0.0 - 1.2 mg/dL    ALT 30 10 - 52 U/L   Type And Screen   Result Value Ref Range    ABO TYPE O     Rh TYPE POS     ANTIBODY SCREEN NEG    Protime-INR   Result Value Ref Range    Protime 10.6 9.3 - 12.7 seconds    INR 1.0 0.9 - 1.2   LDH, Lactate  dehydrogenase   Result Value Ref Range     84 - 246 U/L   Occult Blood, Stool    Specimen: Stool   Result Value Ref Range    Occult Blood, Stool X1 Positive (A) Negative   Urinalysis with Reflex Culture and Microscopic   Result Value Ref Range    Color, Urine Yellow Light-Yellow, Yellow, Dark-Yellow    Appearance, Urine Clear Clear    Specific Gravity, Urine 1.021 1.005 - 1.035    pH, Urine 5.0 5.0, 5.5, 6.0, 6.5, 7.0, 7.5, 8.0    Protein, Urine NEGATIVE NEGATIVE, 10 (TRACE), 20 (TRACE) mg/dL    Glucose, Urine Normal Normal mg/dL    Blood, Urine 0.1 (1+) (A) NEGATIVE    Ketones, Urine NEGATIVE NEGATIVE mg/dL    Bilirubin, Urine NEGATIVE NEGATIVE    Urobilinogen, Urine Normal Normal mg/dL    Nitrite, Urine NEGATIVE NEGATIVE    Leukocyte Esterase, Urine NEGATIVE NEGATIVE   Urinalysis Microscopic   Result Value Ref Range    WBC, Urine NONE 1-5, NONE /HPF    RBC, Urine 1-2 NONE, 1-2, 3-5 /HPF    Mucus, Urine FEW Reference range not established. /LPF     CT abdomen pelvis w IV contrast    Result Date: 12/7/2024  Interpreted By:  Roland Justice, STUDY: CT ABDOMEN PELVIS W IV CONTRAST;  12/7/2024 1:33 pm   INDICATION: Signs/Symptoms:pain rectal bleeding.   COMPARISON: 09/13/2024   ACCESSION NUMBER(S): [id number]   ORDERING CLINICIAN: JUAN C MULTANI   TECHNIQUE: CT of the abdomen and pelvis was performed.  Standard contiguous axial images were obtained at 3 mm slice thickness through the abdomen and pelvis. Coronal and sagittal reconstructions at 3 mm slice thickness were performed.   75  ml of contrast Omnipaque 350 were administered intravenously without immediate complication.   FINDINGS: LOWER CHEST: The visualized bases are clear bilaterally.   ABDOMEN:   LIVER: Liver is normal in size. No focal lesions are seen.   BILE DUCTS: Biliary system is nondilated.   GALLBLADDER: Within normal limits.   PANCREAS: No focal lesions. No peripancreatic fat stranding.   SPLEEN: The spleen is normal in size. No focal  abnormalities are seen.   ADRENAL GLANDS: The adrenal glands bilaterally within normal limits.   KIDNEYS AND URETERS: No hydronephrosis or renal masses. No perinephric stranding. No radiodense renal calculi.   PELVIS:   BLADDER: Within normal limits as distended. No bladder calculi or masses.   REPRODUCTIVE ORGANS: No pelvic free fluid, masses or lymphadenopathy   BOWEL: The small and large bowel are nondilated.  The visualized appendix is normal.   VESSELS: Aorta and IVC within normal limits as visualized in this exam. No signs of aortic aneurysm.   PERITONEUM/RETROPERITONEUM/LYMPH NODES: No retroperitoneal masses are seen.  No lymphadenopathy.   BONES AND ABDOMINAL WALL: There is no evidence for destructive lytic or blastic bone lesions identified.  No abdominal wall masses or hernias are identified.       1.  No CT evidence for acute intra-abdominal or intrapelvic abnormalities are identified. No CT findings to explain patient's symptoms. 2. Additional detailed findings as above.     Signed by: Roland Justice 12/7/2024 1:45 PM Dictation workstation:   [id number]     Assessment/Plan   Rectal bleeding  History of peptic ulcer disease, hemorrhoids    Plan:  Will keep the patient on clear liquid diet, IV Protonix  Monitor vital signs, H&H tomorrow  Consult GI  SCDs for DVT prophylax      Merna Parish MD
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
## 2024-12-07 NOTE — ED PROVIDER NOTES
Patient was seen by both myself and advanced practitioner.  I personally saw the patient and made/approved the management plan and take responsibility for the patient management     Patient is a 64-year-old male who presents emergency room for evaluation of rectal bleeding.  Patient reports that he has a history of recently diagnosed peptic ulcer disease and hiatal hernia.  States he has been taking Carafate, famotidine with improvement of his symptoms over the last week since 10 days since the endoscopy however started having blood in his urine yesterday as well as some epigastric pain and periumbilical pain.  He was seen in urgent care yesterday and was told if he had any rectal bleeding he should come in.  He states he had a very large bowel movement this morning which had an excessive amount of bright red blood as well as significant blood on the toilet paper.  Patient was concerned and came in for further evaluation.  He denies fever, chills, chest pain, shortness of breath.  Patient not on any blood thinners.    On exam patient anxious appearing but in no obvious distress.  He is awake, alert and oriented.  He is hemodynamically stable normal blood pressure, normal heart rate.  Abdomen mild generalized tenderness palpation worse in the center and epigastric region.  There is no jaundice of the skin and no pallor.  Rectal exam performed by practitioner at bedside with chaperone showed gross blood which was bright red however no melena.  Blood work was ordered including CBC, CMP, PT/INR, LDH along with type and screen.  CT scan of the abdomen pelvis was also performed which showed no acute intra-abdominal process and no findings to explain patient's rectal bleeding.  Blood work was remarkable for normal electrolytes, normal kidney function.  He does have normal hemoglobin of 15.  EKG showed normal sinus rhythm with no evidence STEMI or active ischemia.  Did discuss case with gastroenterology Dr. Gomez and he  recommends that since patient had a very large bloody bowel movement with known peptic ulcer disease patient be brought in for observation and trending of hemoglobin level.  Case was discussed with hospitalist who accepted patient for observation.    I independently interpreted the following study(s) CT scan of the abdomen pelvis, EKG which show EKG shows normal sinus rhythm with no evidence of STEMI or active ischemia.  CT scan of the abdomen/pelvis shows no acute intra-abdominal or intrapelvic pathology including no findings to explain patient's rectal bleeding.       Antonio Godinez, DO  12/07/24 150

## 2024-12-08 VITALS
SYSTOLIC BLOOD PRESSURE: 121 MMHG | HEIGHT: 66 IN | TEMPERATURE: 98.2 F | DIASTOLIC BLOOD PRESSURE: 62 MMHG | BODY MASS INDEX: 29.58 KG/M2 | OXYGEN SATURATION: 97 % | HEART RATE: 64 BPM | RESPIRATION RATE: 17 BRPM | WEIGHT: 184.08 LBS

## 2024-12-08 LAB
ALBUMIN SERPL BCP-MCNC: 4 G/DL (ref 3.4–5)
ANION GAP SERPL CALCULATED.3IONS-SCNC: 10 MMOL/L (ref 10–20)
APPEARANCE UR: CLEAR
BILIRUB UR STRIP.AUTO-MCNC: NEGATIVE MG/DL
BUN SERPL-MCNC: 12 MG/DL (ref 6–23)
CALCIUM SERPL-MCNC: 8.6 MG/DL (ref 8.6–10.3)
CHLORIDE SERPL-SCNC: 105 MMOL/L (ref 98–107)
CO2 SERPL-SCNC: 25 MMOL/L (ref 21–32)
COLOR UR: YELLOW
CREAT SERPL-MCNC: 0.9 MG/DL (ref 0.5–1.3)
EGFRCR SERPLBLD CKD-EPI 2021: >90 ML/MIN/1.73M*2
ERYTHROCYTE [DISTWIDTH] IN BLOOD BY AUTOMATED COUNT: 12.3 % (ref 11.5–14.5)
GLUCOSE SERPL-MCNC: 107 MG/DL (ref 74–99)
GLUCOSE UR STRIP.AUTO-MCNC: NORMAL MG/DL
HCT VFR BLD AUTO: 40.5 % (ref 41–52)
HGB BLD-MCNC: 13.8 G/DL (ref 13.5–17.5)
HOLD SPECIMEN: NORMAL
KETONES UR STRIP.AUTO-MCNC: NEGATIVE MG/DL
LEUKOCYTE ESTERASE UR QL STRIP.AUTO: NEGATIVE
MAGNESIUM SERPL-MCNC: 2.01 MG/DL (ref 1.6–2.4)
MCH RBC QN AUTO: 29.9 PG (ref 26–34)
MCHC RBC AUTO-ENTMCNC: 34.1 G/DL (ref 32–36)
MCV RBC AUTO: 88 FL (ref 80–100)
MUCOUS THREADS #/AREA URNS AUTO: NORMAL /LPF
NITRITE UR QL STRIP.AUTO: NEGATIVE
NRBC BLD-RTO: 0 /100 WBCS (ref 0–0)
PH UR STRIP.AUTO: 5.5 [PH]
PHOSPHATE SERPL-MCNC: 2.9 MG/DL (ref 2.5–4.9)
PLATELET # BLD AUTO: 218 X10*3/UL (ref 150–450)
POTASSIUM SERPL-SCNC: 3.8 MMOL/L (ref 3.5–5.3)
PROT UR STRIP.AUTO-MCNC: ABNORMAL MG/DL
RBC # BLD AUTO: 4.62 X10*6/UL (ref 4.5–5.9)
RBC # UR STRIP.AUTO: ABNORMAL /UL
RBC #/AREA URNS AUTO: NORMAL /HPF
SODIUM SERPL-SCNC: 136 MMOL/L (ref 136–145)
SP GR UR STRIP.AUTO: 1.03
UROBILINOGEN UR STRIP.AUTO-MCNC: NORMAL MG/DL
WBC # BLD AUTO: 6.2 X10*3/UL (ref 4.4–11.3)
WBC #/AREA URNS AUTO: NORMAL /HPF

## 2024-12-08 PROCEDURE — 99233 SBSQ HOSP IP/OBS HIGH 50: CPT | Performed by: INTERNAL MEDICINE

## 2024-12-08 PROCEDURE — 36415 COLL VENOUS BLD VENIPUNCTURE: CPT | Performed by: INTERNAL MEDICINE

## 2024-12-08 PROCEDURE — 2500000002 HC RX 250 W HCPCS SELF ADMINISTERED DRUGS (ALT 637 FOR MEDICARE OP, ALT 636 FOR OP/ED): Performed by: FAMILY MEDICINE

## 2024-12-08 PROCEDURE — 80069 RENAL FUNCTION PANEL: CPT | Performed by: INTERNAL MEDICINE

## 2024-12-08 PROCEDURE — 81001 URINALYSIS AUTO W/SCOPE: CPT | Performed by: INTERNAL MEDICINE

## 2024-12-08 PROCEDURE — 2500000001 HC RX 250 WO HCPCS SELF ADMINISTERED DRUGS (ALT 637 FOR MEDICARE OP): Performed by: FAMILY MEDICINE

## 2024-12-08 PROCEDURE — 83735 ASSAY OF MAGNESIUM: CPT | Performed by: INTERNAL MEDICINE

## 2024-12-08 PROCEDURE — 2500000001 HC RX 250 WO HCPCS SELF ADMINISTERED DRUGS (ALT 637 FOR MEDICARE OP): Performed by: INTERNAL MEDICINE

## 2024-12-08 PROCEDURE — S4991 NICOTINE PATCH NONLEGEND: HCPCS | Performed by: INTERNAL MEDICINE

## 2024-12-08 PROCEDURE — G0378 HOSPITAL OBSERVATION PER HR: HCPCS

## 2024-12-08 PROCEDURE — 2500000004 HC RX 250 GENERAL PHARMACY W/ HCPCS (ALT 636 FOR OP/ED)

## 2024-12-08 PROCEDURE — 84153 ASSAY OF PSA TOTAL: CPT | Mod: TRILAB | Performed by: INTERNAL MEDICINE

## 2024-12-08 PROCEDURE — 85027 COMPLETE CBC AUTOMATED: CPT | Performed by: INTERNAL MEDICINE

## 2024-12-08 PROCEDURE — 2500000002 HC RX 250 W HCPCS SELF ADMINISTERED DRUGS (ALT 637 FOR MEDICARE OP, ALT 636 FOR OP/ED)

## 2024-12-08 PROCEDURE — 2500000001 HC RX 250 WO HCPCS SELF ADMINISTERED DRUGS (ALT 637 FOR MEDICARE OP): Performed by: NURSE PRACTITIONER

## 2024-12-08 PROCEDURE — 2500000004 HC RX 250 GENERAL PHARMACY W/ HCPCS (ALT 636 FOR OP/ED): Performed by: NURSE PRACTITIONER

## 2024-12-08 PROCEDURE — 2500000002 HC RX 250 W HCPCS SELF ADMINISTERED DRUGS (ALT 637 FOR MEDICARE OP, ALT 636 FOR OP/ED): Performed by: INTERNAL MEDICINE

## 2024-12-08 PROCEDURE — 96376 TX/PRO/DX INJ SAME DRUG ADON: CPT

## 2024-12-08 RX ORDER — BISACODYL 5 MG
20 TABLET, DELAYED RELEASE (ENTERIC COATED) ORAL ONCE
Status: COMPLETED | OUTPATIENT
Start: 2024-12-08 | End: 2024-12-08

## 2024-12-08 RX ORDER — POLYETHYLENE GLYCOL 3350 17 G/17G
238 POWDER, FOR SOLUTION ORAL ONCE
Status: COMPLETED | OUTPATIENT
Start: 2024-12-08 | End: 2024-12-08

## 2024-12-08 RX ORDER — HYDROXYZINE HYDROCHLORIDE 25 MG/1
25 TABLET, FILM COATED ORAL EVERY 8 HOURS PRN
Status: DISPENSED | OUTPATIENT
Start: 2024-12-08

## 2024-12-08 RX ORDER — IBUPROFEN 200 MG
1 TABLET ORAL DAILY
Status: DISPENSED | OUTPATIENT
Start: 2024-12-08

## 2024-12-08 RX ADMIN — BISACODYL 20 MG: 5 TABLET, COATED ORAL at 14:37

## 2024-12-08 RX ADMIN — SUCRALFATE 1 G: 1 TABLET ORAL at 16:11

## 2024-12-08 RX ADMIN — POLYETHYLENE GLYCOL 3350 238 G: 17 POWDER, FOR SOLUTION ORAL at 14:40

## 2024-12-08 RX ADMIN — CETIRIZINE HYDROCHLORIDE 10 MG: 10 TABLET, FILM COATED ORAL at 08:32

## 2024-12-08 RX ADMIN — FLUTICASONE PROPIONATE 1 SPRAY: 50 SPRAY, METERED NASAL at 08:32

## 2024-12-08 RX ADMIN — PANTOPRAZOLE SODIUM 40 MG: 40 INJECTION, POWDER, FOR SOLUTION INTRAVENOUS at 08:32

## 2024-12-08 RX ADMIN — PANTOPRAZOLE SODIUM 40 MG: 40 INJECTION, POWDER, FOR SOLUTION INTRAVENOUS at 20:30

## 2024-12-08 RX ADMIN — SUCRALFATE 1 G: 1 TABLET ORAL at 10:26

## 2024-12-08 RX ADMIN — NICOTINE 1 PATCH: 21 PATCH, EXTENDED RELEASE TRANSDERMAL at 10:34

## 2024-12-08 RX ADMIN — HYDROXYZINE HYDROCHLORIDE 25 MG: 25 TABLET, FILM COATED ORAL at 10:34

## 2024-12-08 RX ADMIN — SUCRALFATE 1 G: 1 TABLET ORAL at 20:30

## 2024-12-08 RX ADMIN — TAMSULOSIN HYDROCHLORIDE 0.4 MG: 0.4 CAPSULE ORAL at 08:32

## 2024-12-08 RX ADMIN — SUCRALFATE 1 G: 1 TABLET ORAL at 06:05

## 2024-12-08 RX ADMIN — TAMSULOSIN HYDROCHLORIDE 0.4 MG: 0.4 CAPSULE ORAL at 20:30

## 2024-12-08 RX ADMIN — PAROXETINE HYDROCHLORIDE 20 MG: 20 TABLET, FILM COATED ORAL at 08:32

## 2024-12-08 ASSESSMENT — COGNITIVE AND FUNCTIONAL STATUS - GENERAL
MOBILITY SCORE: 24
DAILY ACTIVITIY SCORE: 24
MOBILITY SCORE: 24
DAILY ACTIVITIY SCORE: 24

## 2024-12-08 ASSESSMENT — ENCOUNTER SYMPTOMS
VOMITING: 0
WEAKNESS: 0
CONSTIPATION: 0
FEVER: 0
DIARRHEA: 0
NAUSEA: 0
CHILLS: 0
BLOOD IN STOOL: 1
ABDOMINAL PAIN: 0

## 2024-12-08 ASSESSMENT — PAIN SCALES - GENERAL
PAINLEVEL_OUTOF10: 0 - NO PAIN
PAINLEVEL_OUTOF10: 0 - NO PAIN

## 2024-12-08 NOTE — PROGRESS NOTES
"Vincent P Lanese \"[place]" is a 64 y.o. male on day 0 of admission presenting with GI bleeding.      Subjective   Patient seen and examined at bedside, he stated feeling much better, he didn't have any bowel movements yet since yesterday but he has no more blood in urine. He has some discomfort that he gets with his prostatitis but stated it's not the same as the one her gets with prostatitis flare. He is hoping to get the colonoscopy done while he is in the hospital so he will be done with it. An extensive talk about the risks and hazards of smoking and the benefits of quitting smoking. Denies any f/c, n/v, new onset headaches, vision changes, chest pain, dyspnea, or urinary changes.         Objective     Last Recorded Vitals  BP 95/63 (BP Location: Left arm, Patient Position: Lying)   Pulse 52   Temp 37 °C (98.6 °F) (Temporal)   Resp 17   Wt 83.5 kg (184 lb 1.4 oz)   SpO2 96%   Intake/Output last 3 Shifts:    Intake/Output Summary (Last 24 hours) at 12/8/2024 1200  Last data filed at 12/8/2024 0300  Gross per 24 hour   Intake 750 ml   Output --   Net 750 ml       Admission Weight  Weight: 85.1 kg (187 lb 9.8 oz) (12/07/24 1233)    Daily Weight  12/07/24 : 83.5 kg (184 lb 1.4 oz)    Image Results  CT abdomen pelvis w IV contrast  Narrative: Interpreted By:  Roland Justice,   STUDY:  CT ABDOMEN PELVIS W IV CONTRAST;  12/7/2024 1:33 pm      INDICATION:  Signs/Symptoms:pain rectal bleeding.      COMPARISON:  09/13/2024      ACCESSION NUMBER(S):  [id number]      ORDERING CLINICIAN:  JUAN C MULTANI      TECHNIQUE:  CT of the abdomen and pelvis was performed.  Standard contiguous  axial images were obtained at 3 mm slice thickness through the  abdomen and pelvis. Coronal and sagittal reconstructions at 3 mm  slice thickness were performed.      75  ml of contrast Omnipaque 350 were administered intravenously  without immediate complication.      FINDINGS:  LOWER CHEST:  The visualized bases are clear bilaterally.    "   ABDOMEN:      LIVER:  Liver is normal in size. No focal lesions are seen.      BILE DUCTS:  Biliary system is nondilated.      GALLBLADDER:  Within normal limits.      PANCREAS:  No focal lesions. No peripancreatic fat stranding.      SPLEEN:  The spleen is normal in size. No focal abnormalities are seen.      ADRENAL GLANDS:  The adrenal glands bilaterally within normal limits.      KIDNEYS AND URETERS:  No hydronephrosis or renal masses. No perinephric stranding. No  radiodense renal calculi.      PELVIS:      BLADDER:  Within normal limits as distended. No bladder calculi or masses.      REPRODUCTIVE ORGANS:  No pelvic free fluid, masses or lymphadenopathy      BOWEL:  The small and large bowel are nondilated.  The visualized appendix is  normal.      VESSELS:  Aorta and IVC within normal limits as visualized in this exam. No  signs of aortic aneurysm.      PERITONEUM/RETROPERITONEUM/LYMPH NODES:  No retroperitoneal masses are seen.  No lymphadenopathy.      BONES AND ABDOMINAL WALL:  There is no evidence for destructive lytic or blastic bone lesions  identified.  No abdominal wall masses or hernias are identified.      Impression: 1.  No CT evidence for acute intra-abdominal or intrapelvic  abnormalities are identified. No CT findings to explain patient's  symptoms.  2. Additional detailed findings as above.          Signed by: Roland Justice 12/7/2024 1:45 PM  Dictation workstation:   [id number]      Physical Exam  Constitutional:       General: He is not in acute distress.     Appearance: Normal appearance. He is normal weight.   HENT:      Head: Normocephalic and atraumatic.      Nose: Nose normal.      Mouth/Throat:      Mouth: Mucous membranes are moist.      Pharynx: Oropharynx is clear.   Eyes:      Extraocular Movements: Extraocular movements intact.      Conjunctiva/sclera: Conjunctivae normal.      Pupils: Pupils are equal, round, and reactive to light.   Cardiovascular:      Rate and Rhythm: Normal  rate and regular rhythm.      Pulses: Normal pulses.      Heart sounds: Normal heart sounds.   Pulmonary:      Effort: Pulmonary effort is normal.      Breath sounds: Normal breath sounds.   Abdominal:      General: Abdomen is flat. Bowel sounds are normal.      Palpations: Abdomen is soft.   Musculoskeletal:         General: Normal range of motion.      Cervical back: Normal range of motion and neck supple.   Skin:     General: Skin is warm and dry.   Neurological:      General: No focal deficit present.      Mental Status: He is alert and oriented to person, place, and time. Mental status is at baseline.   Psychiatric:         Mood and Affect: Mood normal.         Behavior: Behavior normal.         Relevant Results  Scheduled medications  cetirizine, 10 mg, oral, Daily  fluticasone, 1 spray, Each Nostril, Daily  nicotine, 1 patch, transdermal, Daily  pantoprazole, 40 mg, intravenous, BID  PARoxetine, 20 mg, oral, Daily  sucralfate, 1 g, oral, Before meals & nightly  tamsulosin, 0.4 mg, oral, BID      Continuous medications     PRN medications  PRN medications: albuterol, hydrOXYzine HCL, mometasone, ondansetron ODT, tiZANidine    Results for orders placed or performed during the hospital encounter of 12/07/24 (from the past 24 hours)   CBC and Auto Differential   Result Value Ref Range    WBC 5.6 4.4 - 11.3 x10*3/uL    nRBC 0.0 0.0 - 0.0 /100 WBCs    RBC 5.05 4.50 - 5.90 x10*6/uL    Hemoglobin 15.0 13.5 - 17.5 g/dL    Hematocrit 44.4 41.0 - 52.0 %    MCV 88 80 - 100 fL    MCH 29.7 26.0 - 34.0 pg    MCHC 33.8 32.0 - 36.0 g/dL    RDW 12.4 11.5 - 14.5 %    Platelets 246 150 - 450 x10*3/uL    Neutrophils % 54.5 40.0 - 80.0 %    Immature Granulocytes %, Automated 0.4 0.0 - 0.9 %    Lymphocytes % 31.5 13.0 - 44.0 %    Monocytes % 11.6 2.0 - 10.0 %    Eosinophils % 1.6 0.0 - 6.0 %    Basophils % 0.4 0.0 - 2.0 %    Neutrophils Absolute 3.05 1.20 - 7.70 x10*3/uL    Immature Granulocytes Absolute, Automated 0.02 0.00 - 0.70  x10*3/uL    Lymphocytes Absolute 1.76 1.20 - 4.80 x10*3/uL    Monocytes Absolute 0.65 0.10 - 1.00 x10*3/uL    Eosinophils Absolute 0.09 0.00 - 0.70 x10*3/uL    Basophils Absolute 0.02 0.00 - 0.10 x10*3/uL   Comprehensive metabolic panel   Result Value Ref Range    Glucose 107 (H) 74 - 99 mg/dL    Sodium 139 136 - 145 mmol/L    Potassium 3.7 3.5 - 5.3 mmol/L    Chloride 103 98 - 107 mmol/L    Bicarbonate 28 21 - 32 mmol/L    Anion Gap 12 10 - 20 mmol/L    Urea Nitrogen 16 6 - 23 mg/dL    Creatinine 1.05 0.50 - 1.30 mg/dL    eGFR 80 >60 mL/min/1.73m*2    Calcium 9.5 8.6 - 10.3 mg/dL    Albumin 4.7 3.4 - 5.0 g/dL    Alkaline Phosphatase 87 33 - 136 U/L    Total Protein 7.1 6.4 - 8.2 g/dL    AST 18 9 - 39 U/L    Bilirubin, Total 0.6 0.0 - 1.2 mg/dL    ALT 30 10 - 52 U/L   Type And Screen   Result Value Ref Range    ABO TYPE O     Rh TYPE POS     ANTIBODY SCREEN NEG    Protime-INR   Result Value Ref Range    Protime 10.6 9.3 - 12.7 seconds    INR 1.0 0.9 - 1.2   LDH, Lactate dehydrogenase   Result Value Ref Range     84 - 246 U/L   Haptoglobin   Result Value Ref Range    Haptoglobin 197 30 - 200 mg/dL   Occult Blood, Stool    Specimen: Stool   Result Value Ref Range    Occult Blood, Stool X1 Positive (A) Negative   Urinalysis with Reflex Culture and Microscopic   Result Value Ref Range    Color, Urine Yellow Light-Yellow, Yellow, Dark-Yellow    Appearance, Urine Clear Clear    Specific Gravity, Urine 1.021 1.005 - 1.035    pH, Urine 5.0 5.0, 5.5, 6.0, 6.5, 7.0, 7.5, 8.0    Protein, Urine NEGATIVE NEGATIVE, 10 (TRACE), 20 (TRACE) mg/dL    Glucose, Urine Normal Normal mg/dL    Blood, Urine 0.1 (1+) (A) NEGATIVE    Ketones, Urine NEGATIVE NEGATIVE mg/dL    Bilirubin, Urine NEGATIVE NEGATIVE    Urobilinogen, Urine Normal Normal mg/dL    Nitrite, Urine NEGATIVE NEGATIVE    Leukocyte Esterase, Urine NEGATIVE NEGATIVE   Urinalysis Microscopic   Result Value Ref Range    WBC, Urine NONE 1-5, NONE /HPF    RBC, Urine 1-2  NONE, 1-2, 3-5 /HPF    Mucus, Urine FEW Reference range not established. /LPF   Hemoglobin and Hematocrit, Blood   Result Value Ref Range    Hemoglobin 14.2 13.5 - 17.5 g/dL    Hematocrit 41.7 41.0 - 52.0 %   CBC   Result Value Ref Range    WBC 6.2 4.4 - 11.3 x10*3/uL    nRBC 0.0 0.0 - 0.0 /100 WBCs    RBC 4.62 4.50 - 5.90 x10*6/uL    Hemoglobin 13.8 13.5 - 17.5 g/dL    Hematocrit 40.5 (L) 41.0 - 52.0 %    MCV 88 80 - 100 fL    MCH 29.9 26.0 - 34.0 pg    MCHC 34.1 32.0 - 36.0 g/dL    RDW 12.3 11.5 - 14.5 %    Platelets 218 150 - 450 x10*3/uL   Renal Function Panel   Result Value Ref Range    Glucose 107 (H) 74 - 99 mg/dL    Sodium 136 136 - 145 mmol/L    Potassium 3.8 3.5 - 5.3 mmol/L    Chloride 105 98 - 107 mmol/L    Bicarbonate 25 21 - 32 mmol/L    Anion Gap 10 10 - 20 mmol/L    Urea Nitrogen 12 6 - 23 mg/dL    Creatinine 0.90 0.50 - 1.30 mg/dL    eGFR >90 >60 mL/min/1.73m*2    Calcium 8.6 8.6 - 10.3 mg/dL    Phosphorus 2.9 2.5 - 4.9 mg/dL    Albumin 4.0 3.4 - 5.0 g/dL   Magnesium   Result Value Ref Range    Magnesium 2.01 1.60 - 2.40 mg/dL   Urinalysis with Reflex Culture and Microscopic   Result Value Ref Range    Color, Urine Yellow Light-Yellow, Yellow, Dark-Yellow    Appearance, Urine Clear Clear    Specific Gravity, Urine 1.028 1.005 - 1.035    pH, Urine 5.5 5.0, 5.5, 6.0, 6.5, 7.0, 7.5, 8.0    Protein, Urine 10 (TRACE) NEGATIVE, 10 (TRACE), 20 (TRACE) mg/dL    Glucose, Urine Normal Normal mg/dL    Blood, Urine 0.1 (1+) (A) NEGATIVE    Ketones, Urine NEGATIVE NEGATIVE mg/dL    Bilirubin, Urine NEGATIVE NEGATIVE    Urobilinogen, Urine Normal Normal mg/dL    Nitrite, Urine NEGATIVE NEGATIVE    Leukocyte Esterase, Urine NEGATIVE NEGATIVE   Urinalysis Microscopic   Result Value Ref Range    WBC, Urine 1-5 1-5, NONE /HPF    RBC, Urine 1-2 NONE, 1-2, 3-5 /HPF    Mucus, Urine 1+ Reference range not established. /LPF                    Assessment/Plan        Assessment & Plan  GI bleeding    Rectal bleeding  64  years old male with PMH of anxiety, prostatitis, Hyperlipidemia, tobacco abuse and recently diagnosed peptic ulcer disease hiatal hernia and hemorrhoids presenting bloody bowel movements:    #Rectal bleeding  History of peptic ulcer disease, hemorrhoids  Patient stated improvement of his symptoms, did not have any more bowel movement since admission.  Hemoglobin has been stable with slight drop, 13.8 from 15 on admission.  Continue on clear liquid diet, IV Protonix  Monitor vital signs, H&H  Consult GI      #Hematuria(resolved)  -Patient stated that he was having hematuria prior to admission.  -He stated that hematuria subsided and his urine was back to normal color.  -UA ordered; pending.      #Tobacco abuse:  -Extensive talk with the patient about the risk and benefits of smoking and quitting smoking.  -Nicotine patch was prescribed.      #Anxiety:  -The patient was having anxiety today.  -Atarax as needed was prescribed and the patient felt better afterwards.    # Chronic prostatitis:  -Patient stated having prostatitis, he does not think he has a flare right now.  -PSA ordered upon patient's request.          SCDs for DVT prophylax    Bill Arce, DO

## 2024-12-08 NOTE — ASSESSMENT & PLAN NOTE
64 years old male with PMH of anxiety, prostatitis, Hyperlipidemia, tobacco abuse and recently diagnosed peptic ulcer disease hiatal hernia and hemorrhoids presenting bloody bowel movements:    #Rectal bleeding  History of peptic ulcer disease, hemorrhoids  Patient stated improvement of his symptoms, did not have any more bowel movement since admission.  Hemoglobin has been stable with slight drop, 13.8 from 15 on admission.  Continue on clear liquid diet, IV Protonix  Monitor vital signs, H&H  Consult GI      #Hematuria(resolved)  -Patient stated that he was having hematuria prior to admission.  -He stated that hematuria subsided and his urine was back to normal color.  -UA ordered; pending.      #Tobacco abuse:  -Extensive talk with the patient about the risk and benefits of smoking and quitting smoking.  -Nicotine patch was prescribed.      #Anxiety:  -The patient was having anxiety today.  -Atarax as needed was prescribed and the patient felt better afterwards.    # Chronic prostatitis:  -Patient stated having prostatitis, he does not think he has a flare right now.  -PSA ordered upon patient's request.          SCDs for DVT prophylax

## 2024-12-08 NOTE — CONSULTS
"Consults    Reason For Consult  GI Bleed    History Of Present Illness  Vince P Lanese is a 64 y.o. male presenting with red, bloody stools. Sudden onset yesterday. He reports recent EGD at end of Nov by Dr Giordano. Patient thought he had an ulcer; however, I reviewed EGD report. He only had gastritis and a small hiatal hernia. No ulcers or signs of bleeding at that time. He was supposed to have a colonoscopy but insurance would not cover. \"Having a lot of problems with scheduling.\" He denies active rectal bleeding since arrival. Hgb normal at 13.8. CT no acute findings      Past Medical History  He has a past medical history of Anxiety, Class 1 obesity with body mass index (BMI) of 31.0 to 31.9 in adult (09/13/2024), Hyperlipidemia, Insomnia, Melena, and Tick bite of left thigh, sequela (09/04/2024).    Surgical History  He has a past surgical history that includes Colonoscopy (10/26/2021) and Colonoscopy (01/01/2014).     Social History  He reports that he has been smoking cigarettes. He has a 10 pack-year smoking history. He has been exposed to tobacco smoke. He has never used smokeless tobacco. He reports that he does not currently use alcohol. He reports that he does not use drugs.    Family History  No family history on file.     Allergies  Ketorolac    Review of Systems   Constitutional:  Negative for chills and fever.   Gastrointestinal:  Positive for blood in stool. Negative for abdominal pain, constipation, diarrhea, nausea and vomiting.   Neurological:  Negative for weakness.        Physical Exam  Vitals reviewed.   Constitutional:       General: He is awake.      Appearance: Normal appearance.   HENT:      Head: Normocephalic and atraumatic.      Mouth/Throat:      Mouth: Mucous membranes are moist.   Cardiovascular:      Rate and Rhythm: Normal rate and regular rhythm.   Pulmonary:      Effort: Pulmonary effort is normal.      Breath sounds: Normal breath sounds.   Abdominal:      General: There is no " "distension.      Palpations: Abdomen is soft.      Tenderness: There is no abdominal tenderness. There is no guarding.   Musculoskeletal:      Cervical back: Normal range of motion and neck supple.   Skin:     General: Skin is warm and dry.   Neurological:      General: No focal deficit present.      Mental Status: He is alert and oriented to person, place, and time. Mental status is at baseline.   Psychiatric:         Attention and Perception: Attention and perception normal.         Mood and Affect: Mood normal.         Behavior: Behavior normal.          Last Recorded Vitals  Blood pressure 95/63, pulse 52, temperature 37 °C (98.6 °F), temperature source Temporal, resp. rate 17, height 1.676 m (5' 5.98\"), weight 83.5 kg (184 lb 1.4 oz), SpO2 96%.    Relevant Results  Results for orders placed or performed during the hospital encounter of 12/07/24 (from the past 24 hours)   CBC and Auto Differential   Result Value Ref Range    WBC 5.6 4.4 - 11.3 x10*3/uL    nRBC 0.0 0.0 - 0.0 /100 WBCs    RBC 5.05 4.50 - 5.90 x10*6/uL    Hemoglobin 15.0 13.5 - 17.5 g/dL    Hematocrit 44.4 41.0 - 52.0 %    MCV 88 80 - 100 fL    MCH 29.7 26.0 - 34.0 pg    MCHC 33.8 32.0 - 36.0 g/dL    RDW 12.4 11.5 - 14.5 %    Platelets 246 150 - 450 x10*3/uL    Neutrophils % 54.5 40.0 - 80.0 %    Immature Granulocytes %, Automated 0.4 0.0 - 0.9 %    Lymphocytes % 31.5 13.0 - 44.0 %    Monocytes % 11.6 2.0 - 10.0 %    Eosinophils % 1.6 0.0 - 6.0 %    Basophils % 0.4 0.0 - 2.0 %    Neutrophils Absolute 3.05 1.20 - 7.70 x10*3/uL    Immature Granulocytes Absolute, Automated 0.02 0.00 - 0.70 x10*3/uL    Lymphocytes Absolute 1.76 1.20 - 4.80 x10*3/uL    Monocytes Absolute 0.65 0.10 - 1.00 x10*3/uL    Eosinophils Absolute 0.09 0.00 - 0.70 x10*3/uL    Basophils Absolute 0.02 0.00 - 0.10 x10*3/uL   Comprehensive metabolic panel   Result Value Ref Range    Glucose 107 (H) 74 - 99 mg/dL    Sodium 139 136 - 145 mmol/L    Potassium 3.7 3.5 - 5.3 mmol/L    " Chloride 103 98 - 107 mmol/L    Bicarbonate 28 21 - 32 mmol/L    Anion Gap 12 10 - 20 mmol/L    Urea Nitrogen 16 6 - 23 mg/dL    Creatinine 1.05 0.50 - 1.30 mg/dL    eGFR 80 >60 mL/min/1.73m*2    Calcium 9.5 8.6 - 10.3 mg/dL    Albumin 4.7 3.4 - 5.0 g/dL    Alkaline Phosphatase 87 33 - 136 U/L    Total Protein 7.1 6.4 - 8.2 g/dL    AST 18 9 - 39 U/L    Bilirubin, Total 0.6 0.0 - 1.2 mg/dL    ALT 30 10 - 52 U/L   Type And Screen   Result Value Ref Range    ABO TYPE O     Rh TYPE POS     ANTIBODY SCREEN NEG    Protime-INR   Result Value Ref Range    Protime 10.6 9.3 - 12.7 seconds    INR 1.0 0.9 - 1.2   LDH, Lactate dehydrogenase   Result Value Ref Range     84 - 246 U/L   Haptoglobin   Result Value Ref Range    Haptoglobin 197 30 - 200 mg/dL   Occult Blood, Stool    Specimen: Stool   Result Value Ref Range    Occult Blood, Stool X1 Positive (A) Negative   Urinalysis with Reflex Culture and Microscopic   Result Value Ref Range    Color, Urine Yellow Light-Yellow, Yellow, Dark-Yellow    Appearance, Urine Clear Clear    Specific Gravity, Urine 1.021 1.005 - 1.035    pH, Urine 5.0 5.0, 5.5, 6.0, 6.5, 7.0, 7.5, 8.0    Protein, Urine NEGATIVE NEGATIVE, 10 (TRACE), 20 (TRACE) mg/dL    Glucose, Urine Normal Normal mg/dL    Blood, Urine 0.1 (1+) (A) NEGATIVE    Ketones, Urine NEGATIVE NEGATIVE mg/dL    Bilirubin, Urine NEGATIVE NEGATIVE    Urobilinogen, Urine Normal Normal mg/dL    Nitrite, Urine NEGATIVE NEGATIVE    Leukocyte Esterase, Urine NEGATIVE NEGATIVE   Urinalysis Microscopic   Result Value Ref Range    WBC, Urine NONE 1-5, NONE /HPF    RBC, Urine 1-2 NONE, 1-2, 3-5 /HPF    Mucus, Urine FEW Reference range not established. /LPF   Hemoglobin and Hematocrit, Blood   Result Value Ref Range    Hemoglobin 14.2 13.5 - 17.5 g/dL    Hematocrit 41.7 41.0 - 52.0 %   CBC   Result Value Ref Range    WBC 6.2 4.4 - 11.3 x10*3/uL    nRBC 0.0 0.0 - 0.0 /100 WBCs    RBC 4.62 4.50 - 5.90 x10*6/uL    Hemoglobin 13.8 13.5 - 17.5  g/dL    Hematocrit 40.5 (L) 41.0 - 52.0 %    MCV 88 80 - 100 fL    MCH 29.9 26.0 - 34.0 pg    MCHC 34.1 32.0 - 36.0 g/dL    RDW 12.3 11.5 - 14.5 %    Platelets 218 150 - 450 x10*3/uL   Renal Function Panel   Result Value Ref Range    Glucose 107 (H) 74 - 99 mg/dL    Sodium 136 136 - 145 mmol/L    Potassium 3.8 3.5 - 5.3 mmol/L    Chloride 105 98 - 107 mmol/L    Bicarbonate 25 21 - 32 mmol/L    Anion Gap 10 10 - 20 mmol/L    Urea Nitrogen 12 6 - 23 mg/dL    Creatinine 0.90 0.50 - 1.30 mg/dL    eGFR >90 >60 mL/min/1.73m*2    Calcium 8.6 8.6 - 10.3 mg/dL    Phosphorus 2.9 2.5 - 4.9 mg/dL    Albumin 4.0 3.4 - 5.0 g/dL   Magnesium   Result Value Ref Range    Magnesium 2.01 1.60 - 2.40 mg/dL   Urinalysis with Reflex Culture and Microscopic   Result Value Ref Range    Color, Urine Yellow Light-Yellow, Yellow, Dark-Yellow    Appearance, Urine Clear Clear    Specific Gravity, Urine 1.028 1.005 - 1.035    pH, Urine 5.5 5.0, 5.5, 6.0, 6.5, 7.0, 7.5, 8.0    Protein, Urine 10 (TRACE) NEGATIVE, 10 (TRACE), 20 (TRACE) mg/dL    Glucose, Urine Normal Normal mg/dL    Blood, Urine 0.1 (1+) (A) NEGATIVE    Ketones, Urine NEGATIVE NEGATIVE mg/dL    Bilirubin, Urine NEGATIVE NEGATIVE    Urobilinogen, Urine Normal Normal mg/dL    Nitrite, Urine NEGATIVE NEGATIVE    Leukocyte Esterase, Urine NEGATIVE NEGATIVE   Urinalysis Microscopic   Result Value Ref Range    WBC, Urine 1-5 1-5, NONE /HPF    RBC, Urine 1-2 NONE, 1-2, 3-5 /HPF    Mucus, Urine 1+ Reference range not established. /LPF     CT abdomen pelvis w IV contrast    Result Date: 12/7/2024  Interpreted By:  Roland Justice, STUDY: CT ABDOMEN PELVIS W IV CONTRAST;  12/7/2024 1:33 pm   INDICATION: Signs/Symptoms:pain rectal bleeding.   COMPARISON: 09/13/2024   ACCESSION NUMBER(S): [id number]   ORDERING CLINICIAN: JUAN C MULTANI   TECHNIQUE: CT of the abdomen and pelvis was performed.  Standard contiguous axial images were obtained at 3 mm slice thickness through the abdomen and pelvis.  Coronal and sagittal reconstructions at 3 mm slice thickness were performed.   75  ml of contrast Omnipaque 350 were administered intravenously without immediate complication.   FINDINGS: LOWER CHEST: The visualized bases are clear bilaterally.   ABDOMEN:   LIVER: Liver is normal in size. No focal lesions are seen.   BILE DUCTS: Biliary system is nondilated.   GALLBLADDER: Within normal limits.   PANCREAS: No focal lesions. No peripancreatic fat stranding.   SPLEEN: The spleen is normal in size. No focal abnormalities are seen.   ADRENAL GLANDS: The adrenal glands bilaterally within normal limits.   KIDNEYS AND URETERS: No hydronephrosis or renal masses. No perinephric stranding. No radiodense renal calculi.   PELVIS:   BLADDER: Within normal limits as distended. No bladder calculi or masses.   REPRODUCTIVE ORGANS: No pelvic free fluid, masses or lymphadenopathy   BOWEL: The small and large bowel are nondilated.  The visualized appendix is normal.   VESSELS: Aorta and IVC within normal limits as visualized in this exam. No signs of aortic aneurysm.   PERITONEUM/RETROPERITONEUM/LYMPH NODES: No retroperitoneal masses are seen.  No lymphadenopathy.   BONES AND ABDOMINAL WALL: There is no evidence for destructive lytic or blastic bone lesions identified.  No abdominal wall masses or hernias are identified.       1.  No CT evidence for acute intra-abdominal or intrapelvic abnormalities are identified. No CT findings to explain patient's symptoms. 2. Additional detailed findings as above.     Signed by: Roland Justice 12/7/2024 1:45 PM Dictation workstation:   [id number]    Esophagogastroduodenoscopy (EGD)    Result Date: 11/25/2024  Table formatting from the original result was not included. Impression Type I hiatal hernia Erythematous, friable mucosa in the antrum, consistent with gastritis; performed cold forceps biopsy to rule out H. pylori The esophagus and duodenum appeared normal. Findings Regular Z-line 42 cm from  the incisors Sliding hiatal hernia (type I hiatal hernia). Tiny sliding hiatal hernia.  Less than 1 cm.. Hill grade I hiatal hernia Erythematous and friable mucosa in the antrum, consistent with gastritis; performed cold forceps biopsy to rule out H. pylori The esophagus and duodenum appeared normal. No obvious ulcers.  No bleeding Recommendation There is no recommended follow-up for this procedure. Indication Melena Staff Staff Role Ronaldo Giordano MD Proceduralist Medications No administrations occurring from 1103 to 1201 on 11/25/24 Preprocedure A history and physical has been performed, and patient medication allergies have been reviewed. The patient's tolerance of previous anesthesia has been reviewed. The risks and benefits of the procedure and the sedation options and risks were discussed with the patient. All questions were answered and informed consent obtained. Details of the Procedure The patient underwent moderate sedation, which was administered by the procedural nurse. The patient's blood pressure, ECG, ETCO2, heart rate, level of consciousness, oxygen and respirations were monitored throughout the procedure. The scope was introduced through the mouth and advanced to the second part of the duodenum. Retroflexion was performed in the cardia. Prior to the procedure, the patient's H. Pylori status was unknown. The patient experienced no blood loss. The procedure was not difficult. The patient tolerated the procedure well. There were no apparent adverse events. Events Procedure Events Event Event Time ENDO SCOPE IN TIME 11/25/2024 11:53 AM ENDO SCOPE OUT TIME 11/25/2024 12:00 PM Specimens ID Type Source Tests Collected by Time 1 : ANTRUM BX Tissue STOMACH ANTRUM BIOPSY SURGICAL PATHOLOGY EXAM Ronaldo Giordano MD 11/25/2024 1156 Procedure [place] [place] [address] [facility] [address] [phone] Referring Provider Daljit Serrano DO Procedure Provider  Ronaldo Giordano MD         Assessment/Plan     Hematochezia, Hiatal Hernia, Gastritis   Sudden onset painless red bloody stools. Recent EGD Nov 2024 without ulcers or AVMs. More likely lower etiology ?diverticular vs hemorrhoidal vs AVM. Given scheduling difficulties, will plan colonoscopy inpatient    -Colonoscopy tomorrow at [facility] placed    -Clear liquid plus prep    -NPO after midnight    -Note, denies anticoagulation. He has been on high dose PPI and Carafate at home     I spent 30 minutes in the professional and overall care of this patient.

## 2024-12-09 ENCOUNTER — ANESTHESIA (OUTPATIENT)
Dept: GASTROENTEROLOGY | Facility: HOSPITAL | Age: 64
End: 2024-12-09
Payer: COMMERCIAL

## 2024-12-09 ENCOUNTER — ANESTHESIA EVENT (OUTPATIENT)
Dept: GASTROENTEROLOGY | Facility: HOSPITAL | Age: 64
End: 2024-12-09
Payer: COMMERCIAL

## 2024-12-09 ENCOUNTER — HOSPITAL ENCOUNTER (OUTPATIENT)
Dept: GASTROENTEROLOGY | Facility: HOSPITAL | Age: 64
Discharge: HOME | End: 2024-12-09
Payer: COMMERCIAL

## 2024-12-09 VITALS
DIASTOLIC BLOOD PRESSURE: 83 MMHG | HEART RATE: 69 BPM | RESPIRATION RATE: 23 BRPM | OXYGEN SATURATION: 96 % | TEMPERATURE: 97.7 F | SYSTOLIC BLOOD PRESSURE: 103 MMHG

## 2024-12-09 VITALS
TEMPERATURE: 98.6 F | OXYGEN SATURATION: 98 % | DIASTOLIC BLOOD PRESSURE: 69 MMHG | HEIGHT: 66 IN | SYSTOLIC BLOOD PRESSURE: 141 MMHG | WEIGHT: 184.08 LBS | BODY MASS INDEX: 29.58 KG/M2 | RESPIRATION RATE: 20 BRPM | HEART RATE: 68 BPM

## 2024-12-09 DIAGNOSIS — D36.9 ADENOMATOUS POLYP: ICD-10-CM

## 2024-12-09 PROBLEM — K92.2 GI BLEEDING: Status: RESOLVED | Noted: 2024-12-07 | Resolved: 2024-12-09

## 2024-12-09 PROBLEM — K62.5 RECTAL BLEEDING: Status: RESOLVED | Noted: 2024-12-07 | Resolved: 2024-12-09

## 2024-12-09 LAB
ALBUMIN SERPL BCP-MCNC: 4 G/DL (ref 3.4–5)
ANION GAP SERPL CALCULATED.3IONS-SCNC: 10 MMOL/L (ref 10–20)
ATRIAL RATE: 64 BPM
BUN SERPL-MCNC: 11 MG/DL (ref 6–23)
CALCIUM SERPL-MCNC: 8.7 MG/DL (ref 8.6–10.3)
CHLORIDE SERPL-SCNC: 105 MMOL/L (ref 98–107)
CO2 SERPL-SCNC: 26 MMOL/L (ref 21–32)
CREAT SERPL-MCNC: 0.97 MG/DL (ref 0.5–1.3)
EGFRCR SERPLBLD CKD-EPI 2021: 88 ML/MIN/1.73M*2
ERYTHROCYTE [DISTWIDTH] IN BLOOD BY AUTOMATED COUNT: 12.1 % (ref 11.5–14.5)
GLUCOSE SERPL-MCNC: 106 MG/DL (ref 74–99)
HCT VFR BLD AUTO: 41.2 % (ref 41–52)
HGB BLD-MCNC: 14 G/DL (ref 13.5–17.5)
MCH RBC QN AUTO: 29.7 PG (ref 26–34)
MCHC RBC AUTO-ENTMCNC: 34 G/DL (ref 32–36)
MCV RBC AUTO: 87 FL (ref 80–100)
NRBC BLD-RTO: 0 /100 WBCS (ref 0–0)
P AXIS: 33 DEGREES
P OFFSET: 184 MS
P ONSET: 138 MS
PHOSPHATE SERPL-MCNC: 3 MG/DL (ref 2.5–4.9)
PLATELET # BLD AUTO: 220 X10*3/UL (ref 150–450)
POTASSIUM SERPL-SCNC: 3.4 MMOL/L (ref 3.5–5.3)
PR INTERVAL: 142 MS
PSA SERPL-MCNC: 1.69 NG/ML
Q ONSET: 209 MS
QRS COUNT: 10 BEATS
QRS DURATION: 96 MS
QT INTERVAL: 410 MS
QTC CALCULATION(BAZETT): 422 MS
QTC FREDERICIA: 418 MS
R AXIS: 20 DEGREES
RBC # BLD AUTO: 4.72 X10*6/UL (ref 4.5–5.9)
SODIUM SERPL-SCNC: 138 MMOL/L (ref 136–145)
T AXIS: 41 DEGREES
T OFFSET: 414 MS
VENTRICULAR RATE: 64 BPM
WBC # BLD AUTO: 6.4 X10*3/UL (ref 4.4–11.3)

## 2024-12-09 PROCEDURE — 2500000002 HC RX 250 W HCPCS SELF ADMINISTERED DRUGS (ALT 637 FOR MEDICARE OP, ALT 636 FOR OP/ED): Mod: MUE | Performed by: INTERNAL MEDICINE

## 2024-12-09 PROCEDURE — 3700000002 HC GENERAL ANESTHESIA TIME - EACH INCREMENTAL 1 MINUTE

## 2024-12-09 PROCEDURE — A45385 PR COLONOSCOPY,REMV LESN,SNARE: Performed by: ANESTHESIOLOGY

## 2024-12-09 PROCEDURE — 45385 COLONOSCOPY W/LESION REMOVAL: CPT | Performed by: INTERNAL MEDICINE

## 2024-12-09 PROCEDURE — 2500000001 HC RX 250 WO HCPCS SELF ADMINISTERED DRUGS (ALT 637 FOR MEDICARE OP): Performed by: INTERNAL MEDICINE

## 2024-12-09 PROCEDURE — 7100000002 HC RECOVERY ROOM TIME - EACH INCREMENTAL 1 MINUTE

## 2024-12-09 PROCEDURE — 80069 RENAL FUNCTION PANEL: CPT | Performed by: INTERNAL MEDICINE

## 2024-12-09 PROCEDURE — 2500000001 HC RX 250 WO HCPCS SELF ADMINISTERED DRUGS (ALT 637 FOR MEDICARE OP): Performed by: FAMILY MEDICINE

## 2024-12-09 PROCEDURE — 7100000001 HC RECOVERY ROOM TIME - INITIAL BASE CHARGE

## 2024-12-09 PROCEDURE — 3700000001 HC GENERAL ANESTHESIA TIME - INITIAL BASE CHARGE

## 2024-12-09 PROCEDURE — 88305 TISSUE EXAM BY PATHOLOGIST: CPT | Mod: TC | Performed by: INTERNAL MEDICINE

## 2024-12-09 PROCEDURE — 1850000001 HC LEAVE OF ABSENCE - HOSPITAL SERVICES

## 2024-12-09 PROCEDURE — 36415 COLL VENOUS BLD VENIPUNCTURE: CPT | Performed by: INTERNAL MEDICINE

## 2024-12-09 PROCEDURE — 2500000004 HC RX 250 GENERAL PHARMACY W/ HCPCS (ALT 636 FOR OP/ED): Performed by: ANESTHESIOLOGY

## 2024-12-09 PROCEDURE — 45385 COLONOSCOPY W/LESION REMOVAL: CPT

## 2024-12-09 PROCEDURE — 85027 COMPLETE CBC AUTOMATED: CPT | Performed by: INTERNAL MEDICINE

## 2024-12-09 PROCEDURE — G0378 HOSPITAL OBSERVATION PER HR: HCPCS

## 2024-12-09 PROCEDURE — 99239 HOSP IP/OBS DSCHRG MGMT >30: CPT | Performed by: INTERNAL MEDICINE

## 2024-12-09 RX ORDER — DEXTROMETHORPHAN/PSEUDOEPHED 2.5-7.5/.8
DROPS ORAL AS NEEDED
Status: COMPLETED | OUTPATIENT
Start: 2024-12-09 | End: 2024-12-09

## 2024-12-09 RX ORDER — PROPOFOL 10 MG/ML
INJECTION, EMULSION INTRAVENOUS AS NEEDED
Status: DISCONTINUED | OUTPATIENT
Start: 2024-12-09 | End: 2024-12-09

## 2024-12-09 RX ORDER — ONDANSETRON HYDROCHLORIDE 2 MG/ML
4 INJECTION, SOLUTION INTRAVENOUS ONCE AS NEEDED
Status: DISCONTINUED | OUTPATIENT
Start: 2024-12-09 | End: 2024-12-10 | Stop reason: HOSPADM

## 2024-12-09 RX ORDER — ALBUTEROL SULFATE 0.83 MG/ML
2.5 SOLUTION RESPIRATORY (INHALATION) ONCE AS NEEDED
Status: DISCONTINUED | OUTPATIENT
Start: 2024-12-09 | End: 2024-12-10 | Stop reason: HOSPADM

## 2024-12-09 RX ORDER — FENTANYL CITRATE 50 UG/ML
50 INJECTION, SOLUTION INTRAMUSCULAR; INTRAVENOUS EVERY 5 MIN PRN
Status: ACTIVE | OUTPATIENT
Start: 2024-12-09 | End: 2024-12-09

## 2024-12-09 RX ORDER — LIDOCAINE HYDROCHLORIDE 10 MG/ML
0.1 INJECTION, SOLUTION INFILTRATION; PERINEURAL ONCE
Status: DISCONTINUED | OUTPATIENT
Start: 2024-12-09 | End: 2024-12-10 | Stop reason: HOSPADM

## 2024-12-09 RX ORDER — MIDAZOLAM HYDROCHLORIDE 1 MG/ML
1 INJECTION, SOLUTION INTRAMUSCULAR; INTRAVENOUS ONCE AS NEEDED
Status: DISCONTINUED | OUTPATIENT
Start: 2024-12-09 | End: 2024-12-10 | Stop reason: HOSPADM

## 2024-12-09 RX ORDER — HYDRALAZINE HYDROCHLORIDE 20 MG/ML
5 INJECTION INTRAMUSCULAR; INTRAVENOUS EVERY 30 MIN PRN
Status: DISCONTINUED | OUTPATIENT
Start: 2024-12-09 | End: 2024-12-10 | Stop reason: HOSPADM

## 2024-12-09 RX ORDER — SODIUM CHLORIDE, SODIUM LACTATE, POTASSIUM CHLORIDE, CALCIUM CHLORIDE 600; 310; 30; 20 MG/100ML; MG/100ML; MG/100ML; MG/100ML
100 INJECTION, SOLUTION INTRAVENOUS CONTINUOUS
Status: ACTIVE | OUTPATIENT
Start: 2024-12-09 | End: 2024-12-09

## 2024-12-09 RX ADMIN — PAROXETINE HYDROCHLORIDE 20 MG: 20 TABLET, FILM COATED ORAL at 13:52

## 2024-12-09 RX ADMIN — TAMSULOSIN HYDROCHLORIDE 0.4 MG: 0.4 CAPSULE ORAL at 13:52

## 2024-12-09 RX ADMIN — SUCRALFATE 1 G: 1 TABLET ORAL at 05:21

## 2024-12-09 RX ADMIN — CETIRIZINE HYDROCHLORIDE 10 MG: 10 TABLET, FILM COATED ORAL at 13:52

## 2024-12-09 RX ADMIN — SUCRALFATE 1 G: 1 TABLET ORAL at 13:52

## 2024-12-09 SDOH — HEALTH STABILITY: MENTAL HEALTH: CURRENT SMOKER: 0

## 2024-12-09 ASSESSMENT — PAIN SCALES - GENERAL
PAINLEVEL_OUTOF10: 0 - NO PAIN
PAIN_LEVEL: 2
PAINLEVEL_OUTOF10: 0 - NO PAIN

## 2024-12-09 ASSESSMENT — COGNITIVE AND FUNCTIONAL STATUS - GENERAL
MOBILITY SCORE: 24
DAILY ACTIVITIY SCORE: 24

## 2024-12-09 ASSESSMENT — PAIN - FUNCTIONAL ASSESSMENT
PAIN_FUNCTIONAL_ASSESSMENT: 0-10

## 2024-12-09 NOTE — ANESTHESIA POSTPROCEDURE EVALUATION
"Patient: Vincent P Lanese \"Bill\"    Procedure Summary       Date: 12/09/24 Room / Location: [facility]    Anesthesia Start: 1038 Anesthesia Stop: 1111    Procedure: COLONOSCOPY Diagnosis: Gastrointestinal hemorrhage associated with gastritis, unspecified gastritis type    Scheduled Providers: Doug Ricketts MD Responsible Provider: Juanito Majano MD    Anesthesia Type: general ASA Status: 2            Anesthesia Type: general    Vitals Value Taken Time   /73 12/09/24 1151   Temp 36.5 °C (97.7 °F) 12/09/24 1145   Pulse 56 12/09/24 1149   Resp 14 12/09/24 1149   SpO2 96 % 12/09/24 1145   Vitals shown include unfiled device data.    Anesthesia Post Evaluation    Patient location during evaluation: PACU  Patient participation: complete - patient participated  Level of consciousness: sleepy but conscious  Pain score: 2  Pain management: adequate  Multimodal analgesia pain management approach  Airway patency: patent  Cardiovascular status: acceptable  Respiratory status: acceptable  Hydration status: acceptable  Postoperative Nausea and Vomiting: none        No notable events documented.    "

## 2024-12-09 NOTE — DISCHARGE SUMMARY
Discharge Diagnosis  GI bleeding  Colonic polyp  Hemorrhoids  Issues Requiring Follow-Up  GI bleeding  Colonic polyp  Hemorrhoids    Discharge Meds     Medication List      CONTINUE taking these medications     albuterol 90 mcg/actuation inhaler; Commonly known as: ProAir HFA;   Inhale 1-2 puffs every 6 hours if needed for shortness of breath or   wheezing.   Arnuity Ellipta 100 mcg/actuation inhaler; Generic drug: fluticasone   furoate; Inhale 1 puff once daily. Rinse mouth with water after use to   reduce aftertaste and incidence of candidiasis. Do not swallow.   cetirizine 10 mg tablet; Commonly known as: ZyrTEC; Take 1 tablet (10   mg) by mouth once daily.   cholecalciferol 50 MCG (2000 UT) tablet; Commonly known as: Vitamin D-3   Flomax 0.4 mg 24 hr capsule; Generic drug: tamsulosin   * Flovent  mcg/actuation inhaler; Generic drug: fluticasone   * fluticasone 50 mcg/actuation nasal spray; Commonly known as: Flonase;   Administer 1 spray into each nostril once daily. Shake gently. Before   first use, prime pump. After use, clean tip and replace cap.   loratadine 10 mg tablet; Commonly known as: Claritin   ondansetron ODT 4 mg disintegrating tablet; Commonly known as:   Zofran-ODT; Dissolve 1 tablet (4 mg) in the mouth every 8 hours if needed   for nausea or vomiting for up to 4 days.   pantoprazole 40 mg EC tablet; Commonly known as: ProtoNix; Take 1 tablet   (40 mg) by mouth 2 times a day. Do not crush, chew, or split.   PARoxetine 20 mg tablet; Commonly known as: Paxil; TAKE ONE TABLET BY   MOUTH DAILY   sucralfate 1 gram tablet; Commonly known as: Carafate; Take 1 tablet (1   g) by mouth 4 times a day before meals for 14 days.   tiZANidine 4 mg tablet; Commonly known as: Zanaflex; Take 1 tablet (4   mg) by mouth every 8 hours if needed for muscle spasms for up to 15 days.  * This list has 2 medication(s) that are the same as other medications   prescribed for you. Read the directions carefully, and ask  your doctor or   other care provider to review them with you.       Test Results Pending At Discharge  Pending Labs       No current pending labs.            Hospital Course   Hx:   Patient is a 64-year-old male who presents emergency room for evaluation of rectal bleeding.  Patient reports that he has a history of recently diagnosed peptic ulcer disease and hiatal hernia.  States he has been taking Carafate, famotidine with improvement of his symptoms over the last week since 10 days since the endoscopy however started having blood in his urine yesterday as well as some epigastric pain and periumbilical pain.  He was seen in urgent care yesterday and was told if he had any rectal bleeding he should come in.  He states he had a very large bowel movement this morning which had an excessive amount of bright red blood as well as significant blood on the toilet paper.  Patient was concerned and came in for further evaluation.  He denies fever, chills, chest pain, shortness of breath.  Patient not on any blood thinners.      Rectal exam in the emergency room showed hemorrhoids and OB positive stool.  Hemoglobin 15.0.      Patient did not require blood transfusion.  He has had a colonoscopy performed by Dr. Ricketts today:  Subcentimeter polyp in the descending colon was removed with cold snare  The terminal ileum appeared normal.  Small hemorrhoids  Patient did not have any active bleeding.  Even with bowel prep.  Patient remained stable.  He is being discharged home.  He will follow-up with gastroenterologist as outpatient.  He wants to go and see Dr. Matias, his previous gastroenterologist.  Patient is to continue his home medications.    Pertinent Physical Exam At Time of Discharge  Physical Exam  Pt is NAD.  Cooperative with exam.  In no distress at rest.  A, Ox3.  Face is symmetrical.  Skin - no lesions.  Lungs: clear to auscultations B/L. No wheezes, rales, rhonchi.  Heart: regular S1S2.  Abdomen: soft, NT, ND. BS  positive.  Extr.: no edema, cords, cyanosis.  Moves all extr.   Outpatient Follow-Up  Future Appointments   Date Time Provider Department Center   2/14/2025  9:30 AM Bladimir Hernandez MD [id number] [facility]       Total time spent with patient today coordinating discharge, including exam, discussion, paperwork 31 minutes  Ashley To MD

## 2024-12-09 NOTE — CARE PLAN
Problem: Pain - Adult  Goal: Verbalizes/displays adequate comfort level or baseline comfort level  Outcome: Met     Problem: Safety - Adult  Goal: Free from fall injury  Outcome: Met     Problem: Discharge Planning  Goal: Discharge to home or other facility with appropriate resources  Outcome: Met     Problem: Chronic Conditions and Co-morbidities  Goal: Patient's chronic conditions and co-morbidity symptoms are monitored and maintained or improved  Outcome: Met

## 2024-12-09 NOTE — PROGRESS NOTES
12/09/24 0952   Discharge Planning   Expected Discharge Disposition Home   Does the patient need discharge transport arranged? No     Patient admitted from home. Per chart review and report from nurse no identified home going needs at this time. Patient to return home when medically appropriate. Please contact Care Transitions if needs arise. Care Transitions unable to see patient at this time as he is at [facility] for colonoscopy.

## 2024-12-09 NOTE — ANESTHESIA PREPROCEDURE EVALUATION
"Patient: Vincent P Lanese \"Bill\"    Procedure Information       Date/Time: 12/09/24 1045    Scheduled providers: Doug Ricketts MD    Procedure: COLONOSCOPY    Location: [facility]            Relevant Problems   Cardiac   (+) Combined hyperlipidemia      Neuro   (+) Chronic anxiety      GI   (+) GI bleeding   (+) Rectal bleeding      /Renal   (+) Benign prostatic hyperplasia      Musculoskeletal   (+) DJD of right AC (acromioclavicular) joint   (+) Generalized osteoarthritis   (+) Osteoarthritis of spine with radiculopathy, cervical region      HEENT   (+) Seasonal allergies       Clinical information reviewed:                   NPO Detail:  No data recorded     Physical Exam    Airway  Mallampati: II  TM distance: >3 FB  Neck ROM: full     Cardiovascular - normal exam     Dental - normal exam     Pulmonary - normal exam     Abdominal - normal exam             Anesthesia Plan    History of general anesthesia?: yes  History of complications of general anesthesia?: no    ASA 2     general     The patient is not a current smoker.  Patient was not previously instructed to abstain from smoking on day of procedure.  Patient did not smoke on day of procedure.  Education provided regarding risk of obstructive sleep apnea.  intravenous induction   Postoperative administration of opioids is intended.  Trial extubation is planned.  Anesthetic plan and risks discussed with patient.  Use of blood products discussed with patient who consented to blood products.    Plan discussed with CAA, attending and CRNA.      "

## 2024-12-09 NOTE — PERIOPERATIVE NURSING NOTE
[facility] at bedside to transfer Patient to [facility] Status Stable.

## 2024-12-09 NOTE — PERIOPERATIVE NURSING NOTE
Patient received to Pacu Boyd #5 from Endo. Anesthesia at bedside. Report received. Initial assessment complete. VSS on Cardiac Monitor. Patient denies pain. Resting comfortably at present.

## 2024-12-11 ENCOUNTER — PATIENT OUTREACH (OUTPATIENT)
Dept: PRIMARY CARE | Facility: CLINIC | Age: 64
End: 2024-12-11
Payer: COMMERCIAL

## 2024-12-11 LAB
LABORATORY COMMENT REPORT: NORMAL
PATH REPORT.FINAL DX SPEC: NORMAL
PATH REPORT.GROSS SPEC: NORMAL
PATH REPORT.RELEVANT HX SPEC: NORMAL
PATH REPORT.TOTAL CANCER: NORMAL

## 2024-12-11 NOTE — PROGRESS NOTES
Discharge Facility: [facility]  Discharge Diagnosis: GI bleeding  Colonic polyp  Hemorrhoids  Admission Date:12/7/24  Discharge Date: 12/9/24    PCP Appointment Date: no appointment, message to office  Specialist Appointment Date: needs GI  Hospital Encounter and Summary Linked: Yes    Two attempts were made to reach patient within two business days after discharge. Voicemail left with contact information for patient to call back with any non-emergent questions or concerns.

## 2024-12-18 ENCOUNTER — OFFICE VISIT (OUTPATIENT)
Dept: PRIMARY CARE | Facility: CLINIC | Age: 64
End: 2024-12-18
Payer: COMMERCIAL

## 2024-12-18 VITALS
SYSTOLIC BLOOD PRESSURE: 118 MMHG | TEMPERATURE: 97.2 F | OXYGEN SATURATION: 97 % | WEIGHT: 195 LBS | DIASTOLIC BLOOD PRESSURE: 70 MMHG | HEIGHT: 65 IN | HEART RATE: 64 BPM | BODY MASS INDEX: 32.49 KG/M2

## 2024-12-18 DIAGNOSIS — K21.9 GASTROESOPHAGEAL REFLUX DISEASE WITHOUT ESOPHAGITIS: ICD-10-CM

## 2024-12-18 DIAGNOSIS — R31.9 HEMATURIA, UNSPECIFIED TYPE: ICD-10-CM

## 2024-12-18 DIAGNOSIS — R11.0 NAUSEA: Primary | ICD-10-CM

## 2024-12-18 LAB
POC APPEARANCE, URINE: CLEAR
POC BILIRUBIN, URINE: NEGATIVE
POC BLOOD, URINE: ABNORMAL
POC COLOR, URINE: YELLOW
POC GLUCOSE, URINE: NEGATIVE MG/DL
POC KETONES, URINE: NEGATIVE MG/DL
POC LEUKOCYTES, URINE: NEGATIVE
POC NITRITE,URINE: NEGATIVE
POC PH, URINE: 6 PH
POC PROTEIN, URINE: NEGATIVE MG/DL
POC SPECIFIC GRAVITY, URINE: 1.02
POC UROBILINOGEN, URINE: 0.2 EU/DL

## 2024-12-18 PROCEDURE — 3008F BODY MASS INDEX DOCD: CPT | Performed by: FAMILY MEDICINE

## 2024-12-18 PROCEDURE — 99495 TRANSJ CARE MGMT MOD F2F 14D: CPT | Performed by: FAMILY MEDICINE

## 2024-12-18 PROCEDURE — 4004F PT TOBACCO SCREEN RCVD TLK: CPT | Performed by: FAMILY MEDICINE

## 2024-12-18 PROCEDURE — 81003 URINALYSIS AUTO W/O SCOPE: CPT | Performed by: FAMILY MEDICINE

## 2024-12-18 RX ORDER — ONDANSETRON 4 MG/1
4 TABLET, ORALLY DISINTEGRATING ORAL EVERY 8 HOURS PRN
Qty: 20 TABLET | Refills: 1 | Status: SHIPPED | OUTPATIENT
Start: 2024-12-18 | End: 2024-12-25

## 2024-12-18 RX ORDER — SUCRALFATE 1 G/1
1 TABLET ORAL
COMMUNITY
End: 2024-12-18 | Stop reason: SDUPTHER

## 2024-12-18 RX ORDER — PANTOPRAZOLE SODIUM 40 MG/1
40 TABLET, DELAYED RELEASE ORAL 2 TIMES DAILY
Qty: 60 TABLET | Refills: 1 | Status: SHIPPED | OUTPATIENT
Start: 2024-12-18 | End: 2025-02-16

## 2024-12-18 RX ORDER — SUCRALFATE 1 G/1
1 TABLET ORAL
Qty: 120 TABLET | Refills: 1 | Status: SHIPPED | OUTPATIENT
Start: 2024-12-18

## 2024-12-18 ASSESSMENT — PATIENT HEALTH QUESTIONNAIRE - PHQ9
2. FEELING DOWN, DEPRESSED OR HOPELESS: NOT AT ALL
SUM OF ALL RESPONSES TO PHQ9 QUESTIONS 1 AND 2: 0
1. LITTLE INTEREST OR PLEASURE IN DOING THINGS: NOT AT ALL

## 2024-12-18 ASSESSMENT — PAIN SCALES - GENERAL: PAINLEVEL_OUTOF10: 7

## 2024-12-18 NOTE — PROGRESS NOTES
"Subjective   Patient ID: Vince P Lanese is a 64 y.o. male who presents for Follow-up (Hospital stay 12/7/2024 for rectal bleeding/Colonoscopy and endoscopy  done while in the hospital//) and Blood in Urine ( Per patient over the past week -   U/A done today in the office).    HPI Here for hospital follow up 12/7/24-12/9/24 for rectal bleed.  Pt had been in his current state of health with ongoing RUQ pain intermittently, when he had BRBPR.  While inpatient he had colonoscopy that found a solitary polyp.  He will be seeing GI in the near future.  He has had a normal abdominal CT and ultrasound.  He was sent home with ondansetron, Carafate, PPI.  Pt has BPH and sees Urology routinely.  His Urologist has recently retired and pt will be going to a new provider.    Review of Systems  Constitutional: Patient is negative for fever, fatigue, weight change.  HEENT: Patient is negative for change in vision, hearing, swallow.  Cardio: Patient is negative for chest pain, lower extremity edema.  Pulmonary: Patient is negative for cough, shortness of breath.  GI: Patient is positive for intermittent right upper quadrant pain.  Objective   Temp 36.2 °C (97.2 °F)   Ht 1.651 m (5' 5\")   Wt 88.5 kg (195 lb)   BMI 32.45 kg/m²     Physical Exam  General: Awake and alert no apparent distress.  HEENT: Moist oral mucosa no cervical adenopathy.  Cardio: Heart S1-S2 no murmur rub or gallop.  Pulmonary: Lungs clear to auscultation bilaterally.  Assessment/Plan   Problem List Items Addressed This Visit             ICD-10-CM    Hematuria   monitor.  Patient will be seeing urology in the near future. R31.9    Relevant Orders    POCT UA Automated manually resulted (Completed)     Other Visit Diagnoses         Codes    Nausea    -  Primary chronic, intermittent.  Refill ondansetron for as needed use.  Chronic, intermittent.  Refill sucralfate and pantoprazole R11.0    Relevant Medications    ondansetron ODT (Zofran-ODT) 4 mg disintegrating " tablet    Gastroesophageal reflux disease without esophagitis    . K21.9    Relevant Medications    pantoprazole (ProtoNix) 40 mg EC tablet    sucralfate (Carafate) 1 gram tablet

## 2024-12-23 ENCOUNTER — TELEPHONE (OUTPATIENT)
Dept: PRIMARY CARE | Facility: CLINIC | Age: 64
End: 2024-12-23
Payer: COMMERCIAL

## 2024-12-23 NOTE — TELEPHONE ENCOUNTER
Patient called  [phone] he was seen last week [facility] did not call in RX Cipro 500 mg, [employer] [place]. Was told [facility] is out

## 2025-01-02 ENCOUNTER — TELEPHONE (OUTPATIENT)
Dept: PRIMARY CARE | Facility: CLINIC | Age: 65
End: 2025-01-02
Payer: COMMERCIAL

## 2025-01-02 DIAGNOSIS — N41.9 PROSTATITIS, UNSPECIFIED PROSTATITIS TYPE: Primary | ICD-10-CM

## 2025-01-02 RX ORDER — CIPROFLOXACIN 500 MG/1
500 TABLET ORAL 2 TIMES DAILY
Qty: 30 TABLET | Refills: 0 | Status: SHIPPED | OUTPATIENT
Start: 2025-01-02

## 2025-01-02 RX ORDER — CIPROFLOXACIN 500 MG/1
500 TABLET ORAL 2 TIMES DAILY
COMMUNITY
End: 2025-01-02 | Stop reason: SDUPTHER

## 2025-01-02 NOTE — TELEPHONE ENCOUNTER
Refill Cipro 500 mg twice daily to [facility] in [place]. Patient has 1 left he would be taking later today. A message was sent on 12/24/24 with no response. New urologist appt is set for 01/17/25.

## 2025-01-03 ENCOUNTER — PATIENT OUTREACH (OUTPATIENT)
Dept: PRIMARY CARE | Facility: CLINIC | Age: 65
End: 2025-01-03
Payer: COMMERCIAL

## 2025-01-03 NOTE — PROGRESS NOTES
Call regarding appt. with PCP on (12/18/24) after hospitalization.  At time of outreach call the patient feels as if their condition has (improved) since last visit.  Reviewed the PCP appointment with the pt and addressed any questions or concerns.   Patient reports some improvement though still feels some lower GI symptoms. He states he has had some bleeding related to hemorrhoids but much improved from what he experienced prior to going to the hospital. He has some discomfort related to this as well. Encouraged follow up as recommended.

## 2025-01-20 ENCOUNTER — TELEPHONE (OUTPATIENT)
Dept: PRIMARY CARE | Facility: CLINIC | Age: 65
End: 2025-01-20
Payer: COMMERCIAL

## 2025-01-20 DIAGNOSIS — N41.9 PROSTATITIS, UNSPECIFIED PROSTATITIS TYPE: ICD-10-CM

## 2025-01-20 RX ORDER — CIPROFLOXACIN 500 MG/1
500 TABLET ORAL 2 TIMES DAILY
Qty: 30 TABLET | Refills: 0 | Status: SHIPPED | OUTPATIENT
Start: 2025-01-20

## 2025-01-20 NOTE — TELEPHONE ENCOUNTER
Patient called  [phone] he cannot see his new urologist Dr until  Thursday he Is out of Cipro, can he get a few more pills, Alexis Nelson, told him FABY is out today

## 2025-01-30 ENCOUNTER — PATIENT OUTREACH (OUTPATIENT)
Dept: PRIMARY CARE | Facility: CLINIC | Age: 65
End: 2025-01-30
Payer: COMMERCIAL

## 2025-01-30 NOTE — PROGRESS NOTES
Successful outreach to patient regarding hospitalization as patient continues TCM program.   At time of outreach call the patient feels as if their condition has (remained the same) since initial visit with PCP or specialist.  Questions or concerns addressed at this time with patient.   Provided contact information to patient if any further non-emergent needs arise.     Patient states he is interested in seeing GI to evaluate continued symptoms related to hernia and ulcer. He feels he may be on too many medications and would like to stop some if possible. He states he has called Dr. Ricketts's office twice but has not yet heard back. Next PCP appt is 2/14/25.

## 2025-02-05 ENCOUNTER — TELEPHONE (OUTPATIENT)
Dept: PRIMARY CARE | Facility: CLINIC | Age: 65
End: 2025-02-05
Payer: COMMERCIAL

## 2025-02-05 DIAGNOSIS — F41.9 CHRONIC ANXIETY: ICD-10-CM

## 2025-02-05 RX ORDER — PAROXETINE HYDROCHLORIDE 20 MG/1
20 TABLET, FILM COATED ORAL DAILY
Qty: 90 TABLET | Refills: 1 | Status: SHIPPED | OUTPATIENT
Start: 2025-02-05

## 2025-02-05 NOTE — TELEPHONE ENCOUNTER
Refill for (Patient has 3 pills left)    Paxil 20 MG    Pharmacy [facility]    Patient can be reached at [phone]

## 2025-02-14 ENCOUNTER — TELEPHONE (OUTPATIENT)
Dept: PRIMARY CARE | Facility: CLINIC | Age: 65
End: 2025-02-14

## 2025-02-14 ENCOUNTER — APPOINTMENT (OUTPATIENT)
Dept: PRIMARY CARE | Facility: CLINIC | Age: 65
End: 2025-02-14
Payer: COMMERCIAL

## 2025-02-14 VITALS
DIASTOLIC BLOOD PRESSURE: 70 MMHG | BODY MASS INDEX: 32.65 KG/M2 | WEIGHT: 196 LBS | TEMPERATURE: 97.6 F | HEART RATE: 78 BPM | OXYGEN SATURATION: 96 % | SYSTOLIC BLOOD PRESSURE: 126 MMHG | HEIGHT: 65 IN

## 2025-02-14 DIAGNOSIS — R73.9 ELEVATED SERUM GLUCOSE: ICD-10-CM

## 2025-02-14 DIAGNOSIS — K21.9 GASTROESOPHAGEAL REFLUX DISEASE WITHOUT ESOPHAGITIS: ICD-10-CM

## 2025-02-14 DIAGNOSIS — Z12.5 SCREENING PSA (PROSTATE SPECIFIC ANTIGEN): ICD-10-CM

## 2025-02-14 DIAGNOSIS — F32.A DEPRESSION, UNSPECIFIED DEPRESSION TYPE: ICD-10-CM

## 2025-02-14 DIAGNOSIS — Z00.00 WELL ADULT EXAM: ICD-10-CM

## 2025-02-14 DIAGNOSIS — F17.210 CIGARETTE SMOKER: ICD-10-CM

## 2025-02-14 DIAGNOSIS — G47.00 INSOMNIA, UNSPECIFIED TYPE: ICD-10-CM

## 2025-02-14 DIAGNOSIS — N40.0 BENIGN PROSTATIC HYPERPLASIA, UNSPECIFIED WHETHER LOWER URINARY TRACT SYMPTOMS PRESENT: ICD-10-CM

## 2025-02-14 DIAGNOSIS — E78.2 COMBINED HYPERLIPIDEMIA: ICD-10-CM

## 2025-02-14 DIAGNOSIS — N41.9 PROSTATITIS, UNSPECIFIED PROSTATITIS TYPE: Primary | ICD-10-CM

## 2025-02-14 PROCEDURE — 99396 PREV VISIT EST AGE 40-64: CPT | Performed by: FAMILY MEDICINE

## 2025-02-14 PROCEDURE — 3008F BODY MASS INDEX DOCD: CPT | Performed by: FAMILY MEDICINE

## 2025-02-14 RX ORDER — TRAZODONE HYDROCHLORIDE 50 MG/1
50 TABLET ORAL NIGHTLY PRN
Qty: 90 TABLET | Refills: 1 | Status: SHIPPED | OUTPATIENT
Start: 2025-02-14 | End: 2026-02-14

## 2025-02-14 ASSESSMENT — ACTIVITIES OF DAILY LIVING (ADL)
DRESSING: INDEPENDENT
TAKING_MEDICATION: INDEPENDENT
GROCERY_SHOPPING: INDEPENDENT
DOING_HOUSEWORK: INDEPENDENT
BATHING: INDEPENDENT
MANAGING_FINANCES: INDEPENDENT

## 2025-02-14 ASSESSMENT — ENCOUNTER SYMPTOMS
OCCASIONAL FEELINGS OF UNSTEADINESS: 0
LOSS OF SENSATION IN FEET: 0

## 2025-02-14 ASSESSMENT — PAIN SCALES - GENERAL: PAINLEVEL_OUTOF10: 0-NO PAIN

## 2025-02-14 NOTE — H&P (VIEW-ONLY)
Subjective   Patient ID: Vince P Lanese is a 64 y.o. male who presents for Medicare Annual Wellness Visit Subsequent (EKG done 12/2024 in hospital/Colonoscopy  12/2024/Tdap  12/2021/Shingrix #1  6/2024/Labs-  several labs done recently by other physicians).    HPI   Jett is seen for for his comprehensive physical exam.  PMH, PSH, family history and social history were reviewed and updated.  Jett is seen today for follow-up of longstanding depression and anxiety . he has been relatively well controlled currently on  Paxil 20 mg qd and doing well.  Patient is suffering from insomnia.  He has been using Benadryl at night.  Jett is seen today for follow-up of significant multiple musculoskeletal complaints. previously has had labral tear of the right shoulder back in May of 2019 He suffers from chronic intermittent neck spasms, with a history of cervical degenerative joint disease .  Also previously had surgery for significant hallux valgus deformity of the left great toe as well as left ankle surgical intervention for history of  trauma 40 years ago.he will have hardware removed in the next month.    Jett is seen today for follow-up of cigarette smoking .  Has been a long-time smoker, nearly 35 years , typically  1/2 pack daily .      Jett is seen today for follow-up of benign prostatic hypertrophy and he sees Urology.  He is on medication at this carlos   Pt had tetanus shot in 2021.  Has had one shingles shot in 2024.  Had one pneumococcal immunization in 2020.  He has not had influenza immunization this year.  Pt had colonoscopy in 2023.  He smokes 1/2 ppd.  He does not use alcohol.    Review of Systems  Constitutional Symptoms:  He is negative for fever, loss of appetite, headaches, fatigue.  Eyes:  He is negative for loss and blurring of vision, double vision.   Ear, Nose, Mouth, Throat:  He is negative for hearing loss, tinnitus, nasal congestion, rhinorrhea, nose bleeds, teeth problems, mouth sores,  "gum disease, dysphagia, sore throat.   Cardiovascular:  He is negative for chest pain/pressure, palpitations, edema, claudication.   Respiratory:  He is negative for shortness of breath, dyspnea on exertion, pain with breathing, coughing.   Breast:  He is negative for tenderness, masses, gynecomastia.   Gastrointestinal:  He is negative for anorexia, indigestion, nausea, vomiting, abdominal pain, change in bowel habits, diarrhea, constipation, hematochezia, melena, blood in stool.   Musculoskeletal:  He is Positive for joint pain , stiffness , myalgias . Negative for joint swelling, myalgias, cramps.   Integumentary:  He is negative for change in mole, skin trouble or rash.   Neurological: He is positive for insomnia.  He is negative for headache, numbness, tingling, weakness, tremors.   Psychiatric:  is positive for depression and anxiety    Endocrine:  He is negative for weight gain, heat or cold intolerance, polyuria, polydipsia, polyphagia.   Hematologic/Lymphatic:  He is negative for bruising, abnormal bleeding, swollen glands.  Objective   /70 (BP Location: Left arm)   Pulse 78   Temp 36.4 °C (97.6 °F) (Temporal)   Ht 1.651 m (5' 5\")   Wt 88.9 kg (196 lb)   SpO2 96%   BMI 32.62 kg/m²     Physical Exam  neral: Vitals reviewed , Jett sits in the exam room chair .  He is a pleasant animated male.  Awake alert oriented.   HEENT : Head is normocephalic atraumatic.  Well groomed short cropped hair with male pattern alopecia noted .   Ears: Normal externally , moderate cerumen in the canals , TMs not visualized.  Eyes : Conjunctiva and sclera clear , pupils equal round reactive , EOMI.   Nose: moist oral mucosa    Oropharynx: Not examined due to mask.   Neck: Soft and supple without adenopathy thyromegaly or asymmetry .   Chest: Normal to inspection , no barrel chest .  Pulmonary : Clear breath sounds posteriorly without wheezing rales rhonchi.   Cardiovascular : Regular rate rhythm , no murmurs rubs " or gallops. Normal S1-S2.  Carotids have no bruit bilaterally .  DP and radial pulses are both 2+ .  There is no clubbing, no edema.  Abdomen: Flat , soft , no tenderness guarding rigidity.   Genitourinary / anorectal: deferred.  Patient sees urology.  Musculoskeletal: Left lower extremity is in a postop shoe.  relatively good strength and range of motion throughout the upper lower extremities .   Neurologic: Intact and nonfocal , cranial nerves 2-12 grossly intact .   Integumentary: No bruising rashes or eruptions.  Psych: Mood and affect are pleasant and appropriate.  Assessment/Plan   Problem List Items Addressed This Visit             ICD-10-CM    Benign prostatic hyperplasia chronic.  Patient sees urology. N40.0    Cigarette smoker needs to quit.  Patient will attempt to continue to cut down. F17.210     Other Visit Diagnoses         Codes    Prostatitis, unspecified prostatitis type    -  Primary chronic.  Patient sees urology. N41.9    Gastroesophageal reflux disease without esophagitis    chronic.  Patient currently using pantoprazole. K21.9    Well adult exam    normal exam.  Chronic, stable chronic, stable. Z00.00    Depression, unspecified depression type    continue on paroxetine. F32.A    Insomnia, unspecified type    needs better control.  Begin trazodone. G47.00    Relevant Medications    traZODone (Desyrel) 50 mg tablet

## 2025-02-24 ENCOUNTER — TELEPHONE (OUTPATIENT)
Dept: PRIMARY CARE | Facility: CLINIC | Age: 65
End: 2025-02-24
Payer: MEDICARE

## 2025-02-24 DIAGNOSIS — K21.9 GASTROESOPHAGEAL REFLUX DISEASE WITHOUT ESOPHAGITIS: ICD-10-CM

## 2025-02-24 RX ORDER — PANTOPRAZOLE SODIUM 40 MG/1
40 TABLET, DELAYED RELEASE ORAL 2 TIMES DAILY
Qty: 60 TABLET | Refills: 1 | Status: SHIPPED | OUTPATIENT
Start: 2025-02-24 | End: 2025-04-25

## 2025-02-27 LAB
ALBUMIN SERPL-MCNC: 4.6 G/DL (ref 3.6–5.1)
ALBUMIN/GLOB SERPL: 2.6 (CALC) (ref 1–2.5)
ALP SERPL-CCNC: 84 U/L (ref 35–144)
ALT SERPL-CCNC: 17 U/L (ref 9–46)
AST SERPL-CCNC: 16 U/L (ref 10–35)
BACTERIA #/AREA URNS HPF: NORMAL /HPF
BASOPHILS # BLD AUTO: 49 CELLS/UL (ref 0–200)
BASOPHILS NFR BLD AUTO: 0.7 %
BILIRUB SERPL-MCNC: 0.8 MG/DL (ref 0.2–1.2)
BUN SERPL-MCNC: 16 MG/DL (ref 7–25)
BUN/CREAT SERPL: ABNORMAL (CALC) (ref 6–22)
CALCIUM SERPL-MCNC: 9.5 MG/DL (ref 8.6–10.3)
CHLORIDE SERPL-SCNC: 104 MMOL/L (ref 98–110)
CO2 SERPL-SCNC: 29 MMOL/L (ref 20–32)
CREAT SERPL-MCNC: 1.1 MG/DL (ref 0.7–1.35)
EGFRCR SERPLBLD CKD-EPI 2021: 75 ML/MIN/1.73M2
EOSINOPHIL # BLD AUTO: 119 CELLS/UL (ref 15–500)
EOSINOPHIL NFR BLD AUTO: 1.7 %
ERYTHROCYTE [DISTWIDTH] IN BLOOD BY AUTOMATED COUNT: 12.8 % (ref 11–15)
GLOBULIN SER CALC-MCNC: 1.8 G/DL (CALC) (ref 1.9–3.7)
GLUCOSE SERPL-MCNC: 104 MG/DL (ref 65–99)
HCT VFR BLD AUTO: 40.2 % (ref 38.5–50)
HGB BLD-MCNC: 13.2 G/DL (ref 13.2–17.1)
HYALINE CASTS #/AREA URNS LPF: NORMAL /LPF
INR PPP: 1
LYMPHOCYTES # BLD AUTO: 1918 CELLS/UL (ref 850–3900)
LYMPHOCYTES NFR BLD AUTO: 27.4 %
MCH RBC QN AUTO: 29.9 PG (ref 27–33)
MCHC RBC AUTO-ENTMCNC: 32.8 G/DL (ref 32–36)
MCV RBC AUTO: 91.2 FL (ref 80–100)
MONOCYTES # BLD AUTO: 420 CELLS/UL (ref 200–950)
MONOCYTES NFR BLD AUTO: 6 %
NEUTROPHILS # BLD AUTO: 4494 CELLS/UL (ref 1500–7800)
NEUTROPHILS NFR BLD AUTO: 64.2 %
PLATELET # BLD AUTO: 208 THOUSAND/UL (ref 140–400)
PMV BLD REES-ECKER: 11.5 FL (ref 7.5–12.5)
POTASSIUM SERPL-SCNC: 4.2 MMOL/L (ref 3.5–5.3)
PROT SERPL-MCNC: 6.4 G/DL (ref 6.1–8.1)
PROTHROMBIN TIME: 10.3 SEC (ref 9–11.5)
RBC # BLD AUTO: 4.41 MILLION/UL (ref 4.2–5.8)
RBC #/AREA URNS HPF: NORMAL /HPF
SERVICE CMNT-IMP: NORMAL
SODIUM SERPL-SCNC: 142 MMOL/L (ref 135–146)
SQUAMOUS #/AREA URNS HPF: NORMAL /HPF
WBC # BLD AUTO: 7 THOUSAND/UL (ref 3.8–10.8)
WBC #/AREA URNS HPF: NORMAL /HPF

## 2025-03-12 ENCOUNTER — PATIENT OUTREACH (OUTPATIENT)
Dept: PRIMARY CARE | Facility: CLINIC | Age: 65
End: 2025-03-12
Payer: MEDICARE

## 2025-03-12 ENCOUNTER — PHARMACY VISIT (OUTPATIENT)
Dept: PHARMACY | Facility: CLINIC | Age: 65
End: 2025-03-12
Payer: COMMERCIAL

## 2025-03-12 ENCOUNTER — HOSPITAL ENCOUNTER (OUTPATIENT)
Facility: HOSPITAL | Age: 65
Setting detail: OUTPATIENT SURGERY
Discharge: HOME | End: 2025-03-12
Attending: PODIATRIST | Admitting: PODIATRIST
Payer: MEDICARE

## 2025-03-12 ENCOUNTER — ANESTHESIA EVENT (OUTPATIENT)
Dept: OPERATING ROOM | Facility: HOSPITAL | Age: 65
End: 2025-03-12
Payer: MEDICARE

## 2025-03-12 ENCOUNTER — ANESTHESIA (OUTPATIENT)
Dept: OPERATING ROOM | Facility: HOSPITAL | Age: 65
End: 2025-03-12
Payer: MEDICARE

## 2025-03-12 VITALS
BODY MASS INDEX: 32.62 KG/M2 | HEART RATE: 62 BPM | WEIGHT: 196 LBS | OXYGEN SATURATION: 99 % | RESPIRATION RATE: 18 BRPM | SYSTOLIC BLOOD PRESSURE: 141 MMHG | DIASTOLIC BLOOD PRESSURE: 77 MMHG | TEMPERATURE: 96.6 F

## 2025-03-12 DIAGNOSIS — M79.672 LEFT FOOT PAIN: ICD-10-CM

## 2025-03-12 DIAGNOSIS — Z98.890 POSTOPERATIVE STATE: ICD-10-CM

## 2025-03-12 DIAGNOSIS — G89.18 POST-OP PAIN: Primary | ICD-10-CM

## 2025-03-12 PROCEDURE — 7100000002 HC RECOVERY ROOM TIME - EACH INCREMENTAL 1 MINUTE: Performed by: PODIATRIST

## 2025-03-12 PROCEDURE — 3700000001 HC GENERAL ANESTHESIA TIME - INITIAL BASE CHARGE: Performed by: PODIATRIST

## 2025-03-12 PROCEDURE — 3700000002 HC GENERAL ANESTHESIA TIME - EACH INCREMENTAL 1 MINUTE: Performed by: PODIATRIST

## 2025-03-12 PROCEDURE — C1762 CONN TISS, HUMAN(INC FASCIA): HCPCS | Performed by: PODIATRIST

## 2025-03-12 PROCEDURE — RXMED WILLOW AMBULATORY MEDICATION CHARGE

## 2025-03-12 PROCEDURE — 7100000009 HC PHASE TWO TIME - INITIAL BASE CHARGE: Performed by: PODIATRIST

## 2025-03-12 PROCEDURE — A20680 PR REMOVAL DEEP IMPLANT: Performed by: STUDENT IN AN ORGANIZED HEALTH CARE EDUCATION/TRAINING PROGRAM

## 2025-03-12 PROCEDURE — 2500000004 HC RX 250 GENERAL PHARMACY W/ HCPCS (ALT 636 FOR OP/ED): Performed by: PODIATRIST

## 2025-03-12 PROCEDURE — 2500000005 HC RX 250 GENERAL PHARMACY W/O HCPCS: Performed by: NURSE ANESTHETIST, CERTIFIED REGISTERED

## 2025-03-12 PROCEDURE — A20680 PR REMOVAL DEEP IMPLANT: Performed by: NURSE ANESTHETIST, CERTIFIED REGISTERED

## 2025-03-12 PROCEDURE — 2500000004 HC RX 250 GENERAL PHARMACY W/ HCPCS (ALT 636 FOR OP/ED): Performed by: NURSE ANESTHETIST, CERTIFIED REGISTERED

## 2025-03-12 PROCEDURE — 2720000007 HC OR 272 NO HCPCS: Performed by: PODIATRIST

## 2025-03-12 PROCEDURE — 3600000009 HC OR TIME - EACH INCREMENTAL 1 MINUTE - PROCEDURE LEVEL FOUR: Performed by: PODIATRIST

## 2025-03-12 PROCEDURE — 3600000004 HC OR TIME - INITIAL BASE CHARGE - PROCEDURE LEVEL FOUR: Performed by: PODIATRIST

## 2025-03-12 PROCEDURE — 2500000004 HC RX 250 GENERAL PHARMACY W/ HCPCS (ALT 636 FOR OP/ED)

## 2025-03-12 PROCEDURE — 2780000003 HC OR 278 NO HCPCS: Performed by: PODIATRIST

## 2025-03-12 PROCEDURE — 7100000001 HC RECOVERY ROOM TIME - INITIAL BASE CHARGE: Performed by: PODIATRIST

## 2025-03-12 PROCEDURE — 7100000010 HC PHASE TWO TIME - EACH INCREMENTAL 1 MINUTE: Performed by: PODIATRIST

## 2025-03-12 DEVICE — IMPLANT, CTM THIN, 4 X 7 CM: Type: IMPLANTABLE DEVICE | Site: FOOT | Status: FUNCTIONAL

## 2025-03-12 DEVICE — IMPLANT, CTM FLOW, CONNECTIVE TISSUE, 4.0ML: Type: IMPLANTABLE DEVICE | Site: FOOT | Status: FUNCTIONAL

## 2025-03-12 RX ORDER — CEFAZOLIN SODIUM 2 G/100ML
2 INJECTION, SOLUTION INTRAVENOUS ONCE
Status: COMPLETED | OUTPATIENT
Start: 2025-03-12 | End: 2025-03-12

## 2025-03-12 RX ORDER — ONDANSETRON HYDROCHLORIDE 2 MG/ML
INJECTION, SOLUTION INTRAVENOUS AS NEEDED
Status: DISCONTINUED | OUTPATIENT
Start: 2025-03-12 | End: 2025-03-12

## 2025-03-12 RX ORDER — FENTANYL CITRATE 50 UG/ML
50 INJECTION, SOLUTION INTRAMUSCULAR; INTRAVENOUS EVERY 5 MIN PRN
Status: DISCONTINUED | OUTPATIENT
Start: 2025-03-12 | End: 2025-03-12 | Stop reason: HOSPADM

## 2025-03-12 RX ORDER — BUPIVACAINE HYDROCHLORIDE 5 MG/ML
INJECTION, SOLUTION PERINEURAL AS NEEDED
Status: DISCONTINUED | OUTPATIENT
Start: 2025-03-12 | End: 2025-03-12 | Stop reason: HOSPADM

## 2025-03-12 RX ORDER — IPRATROPIUM BROMIDE 0.5 MG/2.5ML
500 SOLUTION RESPIRATORY (INHALATION) AS NEEDED
Status: DISCONTINUED | OUTPATIENT
Start: 2025-03-12 | End: 2025-03-12 | Stop reason: HOSPADM

## 2025-03-12 RX ORDER — HYDRALAZINE HYDROCHLORIDE 20 MG/ML
5 INJECTION INTRAMUSCULAR; INTRAVENOUS EVERY 30 MIN PRN
Status: DISCONTINUED | OUTPATIENT
Start: 2025-03-12 | End: 2025-03-12 | Stop reason: HOSPADM

## 2025-03-12 RX ORDER — OXYCODONE HYDROCHLORIDE 5 MG/1
5 TABLET ORAL ONCE AS NEEDED
Status: DISCONTINUED | OUTPATIENT
Start: 2025-03-12 | End: 2025-03-12 | Stop reason: HOSPADM

## 2025-03-12 RX ORDER — LABETALOL HYDROCHLORIDE 5 MG/ML
5 INJECTION, SOLUTION INTRAVENOUS ONCE AS NEEDED
Status: DISCONTINUED | OUTPATIENT
Start: 2025-03-12 | End: 2025-03-12 | Stop reason: HOSPADM

## 2025-03-12 RX ORDER — PROCHLORPERAZINE EDISYLATE 5 MG/ML
5 INJECTION INTRAMUSCULAR; INTRAVENOUS ONCE AS NEEDED
Status: DISCONTINUED | OUTPATIENT
Start: 2025-03-12 | End: 2025-03-12 | Stop reason: HOSPADM

## 2025-03-12 RX ORDER — FENTANYL CITRATE 50 UG/ML
INJECTION, SOLUTION INTRAMUSCULAR; INTRAVENOUS AS NEEDED
Status: DISCONTINUED | OUTPATIENT
Start: 2025-03-12 | End: 2025-03-12

## 2025-03-12 RX ORDER — ONDANSETRON HYDROCHLORIDE 2 MG/ML
4 INJECTION, SOLUTION INTRAVENOUS ONCE AS NEEDED
Status: DISCONTINUED | OUTPATIENT
Start: 2025-03-12 | End: 2025-03-12 | Stop reason: HOSPADM

## 2025-03-12 RX ORDER — ALBUTEROL SULFATE 0.83 MG/ML
2.5 SOLUTION RESPIRATORY (INHALATION) ONCE AS NEEDED
Status: DISCONTINUED | OUTPATIENT
Start: 2025-03-12 | End: 2025-03-12 | Stop reason: HOSPADM

## 2025-03-12 RX ORDER — DIPHENHYDRAMINE HYDROCHLORIDE 50 MG/ML
12.5 INJECTION INTRAMUSCULAR; INTRAVENOUS ONCE AS NEEDED
Status: DISCONTINUED | OUTPATIENT
Start: 2025-03-12 | End: 2025-03-12 | Stop reason: HOSPADM

## 2025-03-12 RX ORDER — NORETHINDRONE AND ETHINYL ESTRADIOL 0.5-0.035
KIT ORAL AS NEEDED
Status: DISCONTINUED | OUTPATIENT
Start: 2025-03-12 | End: 2025-03-12

## 2025-03-12 RX ORDER — SODIUM CHLORIDE, SODIUM LACTATE, POTASSIUM CHLORIDE, CALCIUM CHLORIDE 600; 310; 30; 20 MG/100ML; MG/100ML; MG/100ML; MG/100ML
100 INJECTION, SOLUTION INTRAVENOUS CONTINUOUS
Status: DISCONTINUED | OUTPATIENT
Start: 2025-03-12 | End: 2025-03-12 | Stop reason: HOSPADM

## 2025-03-12 RX ORDER — OXYCODONE AND ACETAMINOPHEN 5; 325 MG/1; MG/1
1 TABLET ORAL EVERY 6 HOURS PRN
Qty: 28 TABLET | Refills: 0 | Status: SHIPPED | OUTPATIENT
Start: 2025-03-12 | End: 2025-03-19

## 2025-03-12 RX ORDER — MIDAZOLAM HYDROCHLORIDE 1 MG/ML
INJECTION, SOLUTION INTRAMUSCULAR; INTRAVENOUS AS NEEDED
Status: DISCONTINUED | OUTPATIENT
Start: 2025-03-12 | End: 2025-03-12

## 2025-03-12 RX ORDER — LIDOCAINE HYDROCHLORIDE 10 MG/ML
INJECTION, SOLUTION INFILTRATION; PERINEURAL AS NEEDED
Status: DISCONTINUED | OUTPATIENT
Start: 2025-03-12 | End: 2025-03-12

## 2025-03-12 RX ORDER — PROPOFOL 10 MG/ML
INJECTION, EMULSION INTRAVENOUS AS NEEDED
Status: DISCONTINUED | OUTPATIENT
Start: 2025-03-12 | End: 2025-03-12

## 2025-03-12 RX ORDER — FENTANYL CITRATE 50 UG/ML
25 INJECTION, SOLUTION INTRAMUSCULAR; INTRAVENOUS EVERY 5 MIN PRN
Status: DISCONTINUED | OUTPATIENT
Start: 2025-03-12 | End: 2025-03-12 | Stop reason: HOSPADM

## 2025-03-12 RX ORDER — ONDANSETRON 4 MG/1
8 TABLET, FILM COATED ORAL EVERY 8 HOURS PRN
Qty: 15 TABLET | Refills: 0 | Status: SHIPPED | OUTPATIENT
Start: 2025-03-12 | End: 2025-03-17

## 2025-03-12 RX ADMIN — FENTANYL CITRATE 25 MCG: 50 INJECTION, SOLUTION INTRAMUSCULAR; INTRAVENOUS at 11:21

## 2025-03-12 RX ADMIN — EPHEDRINE SULFATE 10 MG: 50 INJECTION, SOLUTION INTRAVENOUS at 11:00

## 2025-03-12 RX ADMIN — PROPOFOL 200 MG: 10 INJECTION, EMULSION INTRAVENOUS at 10:27

## 2025-03-12 RX ADMIN — FENTANYL CITRATE 25 MCG: 50 INJECTION, SOLUTION INTRAMUSCULAR; INTRAVENOUS at 10:27

## 2025-03-12 RX ADMIN — ONDANSETRON HYDROCHLORIDE 4 MG: 2 INJECTION INTRAMUSCULAR; INTRAVENOUS at 10:53

## 2025-03-12 RX ADMIN — CEFAZOLIN SODIUM 2 G: 2 INJECTION, SOLUTION INTRAVENOUS at 10:25

## 2025-03-12 RX ADMIN — LIDOCAINE HYDROCHLORIDE 50 MG: 10 INJECTION, SOLUTION INFILTRATION; PERINEURAL at 10:27

## 2025-03-12 RX ADMIN — FENTANYL CITRATE 50 MCG: 50 INJECTION, SOLUTION INTRAMUSCULAR; INTRAVENOUS at 10:30

## 2025-03-12 RX ADMIN — MIDAZOLAM 2 MG: 1 INJECTION INTRAMUSCULAR; INTRAVENOUS at 10:17

## 2025-03-12 RX ADMIN — EPHEDRINE SULFATE 10 MG: 50 INJECTION, SOLUTION INTRAVENOUS at 10:40

## 2025-03-12 RX ADMIN — SODIUM CHLORIDE, POTASSIUM CHLORIDE, SODIUM LACTATE AND CALCIUM CHLORIDE: 600; 310; 30; 20 INJECTION, SOLUTION INTRAVENOUS at 10:14

## 2025-03-12 ASSESSMENT — PAIN SCALES - GENERAL
PAINLEVEL_OUTOF10: 10 - WORST POSSIBLE PAIN
PAINLEVEL_OUTOF10: 0 - NO PAIN
PAINLEVEL_OUTOF10: 10 - WORST POSSIBLE PAIN
PAINLEVEL_OUTOF10: 0 - NO PAIN
PAINLEVEL_OUTOF10: 0 - NO PAIN
PAINLEVEL_OUTOF10: 10 - WORST POSSIBLE PAIN

## 2025-03-12 ASSESSMENT — PAIN - FUNCTIONAL ASSESSMENT
PAIN_FUNCTIONAL_ASSESSMENT: FLACC (FACE, LEGS, ACTIVITY, CRY, CONSOLABILITY)
PAIN_FUNCTIONAL_ASSESSMENT: 0-10
PAIN_FUNCTIONAL_ASSESSMENT: FLACC (FACE, LEGS, ACTIVITY, CRY, CONSOLABILITY)
PAIN_FUNCTIONAL_ASSESSMENT: 0-10

## 2025-03-12 ASSESSMENT — PAIN DESCRIPTION - DESCRIPTORS
DESCRIPTORS: THROBBING
DESCRIPTORS: ACHING

## 2025-03-12 NOTE — ANESTHESIA PROCEDURE NOTES
Airway  Date/Time: 3/12/2025 10:31 AM  Urgency: elective    Airway not difficult    Staffing  Performed: CRNA   Authorized by: Roberto Almanza MD    Performed by: YULIYA Brown-CRNA  Patient location during procedure: OR    Indications and Patient Condition  Indications for airway management: anesthesia  Spontaneous Ventilation: absent  Sedation level: deep  Preoxygenated: yes  Patient position: sniffing  MILS maintained throughout  Mask difficulty assessment: 0 - not attempted    Final Airway Details  Final airway type: supraglottic airway      Successful airway: Size 4     Number of attempts at approach: 1

## 2025-03-12 NOTE — DISCHARGE INSTRUCTIONS
PODIATRIC SURGERY POST-OP INSTRUCTIONS    YOU HAD ANESTHESIA:  You must have a responsible adult drive you home and stay with you for the first 24 hours.    Do not drive a car or drink any alcohol for 24 hours after surgery.  Do not do any strenuous work or activity for the next 24 hours.  You may have mild nausea, a sore throat or raspy voice for a few days.    You received a local numbing block to the foot after your surgery. You should expect the surgical extremity to remain numb for the next 6-12 hours.    POST-OP INSTRUCTIONS:  Keep dressing clean, dry and intact until your first post-operative appointment.  Do not remove surgical dressing. You may need to loosen if necessary.   Do not shower unless using a cast protector to protect dressing.  If dressing gets wet, please call office immediately as this can lead to increased risk of infection or wound dehiscence.   Elevate surgical extremity as much as possible to help with pain and swelling.  Place ice pack behind knee of surgical extremity (20 minutes on/20 minutes off while awake). After 24 hours use ice behind the knee as needed for comfort.  Weight Bearing Status: Please remain weight bearing as tolerated to the surgical extremity with CAM boot in place utilizing crutches, a walker or a knee scooter.  Please resume all home medications.   Post-Operative Medications: You were prescribed percocet for pain; please take as directed on the label.  Post-operative appointment with Dr. Bardales for 3/18/25. Please call to schedule if not already scheduled.  Should any problems, questions, or concerns arise, please call the clinic office.    WHEN TO CALL YOUR DOCTOR:  Fever (>100.4°F or 38.0°C) or chills.  Incision problems such as redness, warmth, swelling, or foul smelling drainage.  Severe nausea or persistent vomiting.  Pain and swelling in your legs, especially if it is only on one side and not the other.  Pain with urination, cloudy urine, or foul smelling  urine.  Or if you have any other problems or questions.  CALL 911 or go to the ED if you have any shortness of breath, difficulty breathing, or chest pain.

## 2025-03-12 NOTE — ANESTHESIA POSTPROCEDURE EVALUATION
"Patient: Vincent P Lanese \"Bill\"    Procedure Summary       Date: 03/12/25 Room / Location: [facility] 01 / [facility]    Anesthesia Start: 1014 Anesthesia Stop: 1124    Procedures:       Removal Hardware Lower Extremity (Left: Foot)      Scar Revision Lower Extremity (Left: Foot)      Decompression External Neurolysis and Neuroplasty (Left: Foot) Diagnosis:       Left foot pain      Pain in prosthetic joint, initial encounter (CMS-[facility])      Difficulty walking      (Left foot pain. Painful left foot hardware. Difficultly walking.)    Surgeons: Win Bardales DPM Responsible Provider: Roberto Almanza MD    Anesthesia Type: general ASA Status: 2            Anesthesia Type: general    Vitals Value Taken Time   /80 03/12/25 1211   Temp 36.4 °C (97.5 °F) 03/12/25 1200   Pulse 52 03/12/25 1215   Resp 11 03/12/25 1215   SpO2 94 % 03/12/25 1215   Vitals shown include unfiled device data.    Anesthesia Post Evaluation    Patient location during evaluation: PACU  Patient participation: complete - patient participated  Level of consciousness: awake  Pain management: adequate  Multimodal analgesia pain management approach  Airway patency: patent  Cardiovascular status: acceptable and hemodynamically stable  Respiratory status: acceptable and spontaneous ventilation  Hydration status: euvolemic  Postoperative Nausea and Vomiting: none        There were no known notable events for this encounter.    "

## 2025-03-12 NOTE — PERIOPERATIVE NURSING NOTE
1218- Arrived to [facility] 11 via cart with RN. Alert, denies nausea. Left foot pain 10/10, aching. Pt declines pain medication, said pain is tolerable and a better pain then experienced preop. Dressing dry and intact. Toes pink, warm, and with brisk cap refill. Repositioned and set up with drink and snack, call light within reach. Pt states daughter will be here at 130 pm for transportation home. Rx To Go at bedside with discharge medications. Discharge instructions include a CAM boot. Message sent to OR team for a CAM Boot.

## 2025-03-12 NOTE — POST-PROCEDURE NOTE
Left Foot dressing dry & intact.Dr Bardales brought pt CAM boot. CAM boot on & pt up to bathroom. Waiting on daughter to pick him up.

## 2025-03-12 NOTE — ANESTHESIA PREPROCEDURE EVALUATION
"Patient: Vincent P Lanese \"Bill\"    Procedure Information       Anesthesia Start Date/Time: 03/12/25 1014    Procedures:       Removal Hardware Lower Extremity (Left: Foot)      Scar Revision Lower Extremity (Left: Foot)      Decompression External Neurolysis and Neuroplasty (Left: Foot) - C-ARM (NO HARDWARE NEEDED)    Location: [facility] 01 / [facility]    Surgeons: Win Bardales DPM            Relevant Problems   Anesthesia (within normal limits)      Cardiac   (+) Combined hyperlipidemia   (-) History of coronary artery bypass graft   (-) Pacemaker      Pulmonary   (-) Asthma   (-) Chronic obstructive pulmonary disease (Multi)   (-) ASAEL (obstructive sleep apnea)      Neuro   (+) Chronic anxiety   (-) CVA (cerebral vascular accident) (Multi)   (-) Seizures (Multi)      /Renal   (+) Benign prostatic hyperplasia      Endocrine   (-) Diabetes mellitus, type 2 (Multi)      Hematology   (-) Coagulopathy (Multi)      Musculoskeletal   (+) DJD of right AC (acromioclavicular) joint   (+) Generalized osteoarthritis   (+) Osteoarthritis of spine with radiculopathy, cervical region      HEENT   (+) Seasonal allergies       Clinical information reviewed:   Tobacco  Allergies  Meds   Med Hx  Surg Hx   Fam Hx  Soc Hx        NPO Detail:  NPO/Void Status  Carbohydrate Drink Given Prior to Surgery? : N  Date of Last Liquid: 03/11/25  Time of Last Liquid: 2300  Date of Last Solid: 03/11/25  Time of Last Solid: 2200  Last Intake Type: Clear fluids  Time of Last Void: 0800         Physical Exam    Airway  Mallampati: I  TM distance: >3 FB  Neck ROM: full     Cardiovascular    Dental    Pulmonary    Abdominal            Anesthesia Plan    History of general anesthesia?: yes  History of complications of general anesthesia?: no    ASA 2     general     intravenous induction   Postoperative administration of opioids is intended.  Anesthetic plan and risks discussed with patient.      "

## 2025-03-12 NOTE — POST-PROCEDURE NOTE
Assumed care of pt. Pt tolerating snack. Discharge instructions reviewed with pt, understanding verbalized. Pt denies pain.  Vitals stable. IV discontinued with tip intact.

## 2025-03-13 NOTE — OP NOTE
"Removal Hardware Lower Extremity (L), Scar Revision Lower Extremity (L), Decompression External Neurolysis and Neuroplasty (L) Operative Note     Date: 3/12/2025  OR Location: [facility]    Name: Vincent P Lanese \"Bill\", : 1960, Age: 64 y.o., MRN: [id number], Sex: male    Diagnosis  Pre-op Diagnosis      * Left foot pain [M79.672]     * Pain in prosthetic joint, initial encounter (CMS-HCC) [T84.84XA]     * Difficulty walking [R26.2] Post-op Diagnosis     * Left foot pain [M79.672]     * Pain in prosthetic joint, initial encounter (CMS-HCC) [T84.84XA]     * Difficulty walking [R26.2]     Procedures  Removal Hardware Left Lower Extremity  01659 - [place] REMOVAL IMPLANT DEEP    Scar Revision Left Lower Extremity  97285 - [place] REPAIR COMPLEX F/C/C/M/N/AX/G/H/F 1.1-2.5 CM    Decompression External Neurolysis and Neuroplasty, Left foot  01764 - [place] DECOMPRESSION PLANTAR DIGITAL NERVE    Application Allograft Skin  84294 - [place] SUB GRFT F/S/N/H/F/G/M/D <100SQ CM 1ST 25 SQ CM    Surgeons      * Win Bardales - Primary    Resident/Fellow/Other Assistant:  Surgeons and Role:  * No surgeons found with a matching role *    Staff:   Tamiko: Hu Craig Person: Ranjeet    Anesthesia Staff: Anesthesiologist: Roberto Almanza MD  CRNA: YULIYA Brown-CRNA    Procedure Summary  Anesthesia: General  ASA: II  Estimated Blood Loss: 5 mL  Intra-op Medications:   Administrations occurring from 1050 to 1245 on 25:   Medication Name Total Dose   ePHEDrine 50 mg/mL 10 mg   fentaNYL PF 0.05 mg/mL 25 mcg   LR bolus Cannot be calculated   ondansetron 2 mg/mL 4 mg          Anesthesia Record               Intraprocedure I/O Totals          Intake    LR bolus 400.00 mL    ceFAZolin (Ancef) 2 g in dextrose (iso)  mL 100.00 mL    Total Intake 500 mL          Specimen: No specimens collected      Drains and/or Catheters: * None in log *    Tourniquet Times:         Implants:  Implants       Type Name Action Serial No.      " "Craniomaxillofacial Implant IMPLANT, CTM THIN, 4 X 7 CM - [id number] - [id number] Implanted [id number]     Implant IMPLANT, CTM FLOW, CONNECTIVE TISSUE, 4.0ML - [id number] - [id number] Implanted [id number]            Findings: Intraoperative findings consistent with clinical and radiographic findings.    Indications: Vincent P Lanese \"[place]" is an 64 y.o. male who is having surgery for Left foot pain. Painful left foot hardware. Difficultly walking.    The patient was seen in the preoperative area. The risks, benefits, complications, treatment options, non-operative alternatives, expected recovery and outcomes were discussed with the patient. The possibilities of reaction to medication, pulmonary aspiration, injury to surrounding structures, bleeding, recurrent infection, the need for additional procedures, failure to diagnose a condition, and creating a complication requiring transfusion or operation were discussed with the patient. The patient concurred with the proposed plan, giving informed consent.  The site of surgery was properly noted/marked if necessary per policy. The patient has been actively warmed in preoperative area. Preoperative antibiotics have been ordered and given within 1 hours of incision. Venous thrombosis prophylaxis have been ordered including unilateral sequential compression device.    Pre-procedure information:  In pre-op holding area, the operative extremity was clearly marked and the patient verified correct laterality of the marking.  The patient was brought to the operating room and placed on the operating table in the supine position.  A timeout was performed in which identification of the correct patient, procedure, location, and materials was done. Following IV sedation, local anesthesia was obtained utilizing 10 cc of 0.5% marcaine plain. The operative extremity was then scrubbed, prepped and draped in the usual aseptic manner.     Procedure Details:   Removal " Hardware Left Lower Extremity  Attention was directed to the left 1st metatarsophalangeal joint where the painful surgical implant was located, which included a 1st metatarsophalangeal joint arthrodesis plate and 6 screws. An incision was made over the scar from the previous surgery. This incision was deepened via blunt and sharp dissection to the level of the painful implant. Care was taken throughout dissection to avoid damage to neurovascular or tendinous structures. Once the implant was visualized, it was removed in its entirety, as verified by palpation and intraoperative fluoroscopy.     Decompression External Neurolysis and Neuroplasty, Left Foot  Just medial to the first metatarsophalangeal joint, the doromedial cutaneous nerve to the hallux was visualized and found to be entrapped by scar tissue. Using a #15 blade, the nerve was freed from the entrapment without incident.     Scar Revision Left Lower Extremity  Significant scar tissue was noted within the surgical wound. The decision to excise the scar tissue was made in order to  facilitate healing and prevent future nerve entrapment. The scar tissue was excised in an ellipse measuring 6 cm x 2 cm. Using a #15 blade, the skin was undermined to mobilize the soft tissue for closure.    Application Allograft Skin  The CTM thin graft was applied to the wound bed to cover the tissue and enhance granulation of the tissue. This was secured into position and confirmed before closure. The connective tissue particulate graft, CTM flow, was then obtained in a 10cc syringe. The allograft was then injected within and around the wound to aid and support the repair of tissue during secondary healing.    The incision was flushed thoroughly with normal saline.    The incision was closed in layers using 3-0 Vicryl for deep and subcutaneous tissue and staples for skin.     A sterile dressing was applied to the operative extremity consisting of betadine soaked gauze, 4x4  gauze, Kerlix and an ACE bandage with light compression.     Neurovascular status was found to be intact to the operative extremity. Brisk capillary refill to toes.     POSTOPERATIVE INFORMATION: The patient tolerated the procedure and anesthesia well. The patient was returned to PACU with vitals stable and neurovascular status intact to the operative extremity. A prescription for pain medication was sent to the patient's pharmacy of choice. Patient to will follow up in 7-10 days for first post-operative visit. Written post-operative instructions provided.     This is Silvia Alejandro DPM PGY-2 dictating the operative report.     Complications:  None; patient tolerated the procedure well.    Disposition: PACU - hemodynamically stable.  Condition: stable     Additional Details: None    Attending Attestation: I was present and scrubbed for the key portions of the procedure.    Win Bardales  Phone Number: [phone]

## 2025-04-28 ENCOUNTER — TELEPHONE (OUTPATIENT)
Dept: PRIMARY CARE | Facility: CLINIC | Age: 65
End: 2025-04-28
Payer: MEDICARE

## 2025-04-28 DIAGNOSIS — K21.9 GASTROESOPHAGEAL REFLUX DISEASE WITHOUT ESOPHAGITIS: ICD-10-CM

## 2025-04-29 ENCOUNTER — OFFICE VISIT (OUTPATIENT)
Dept: PRIMARY CARE | Facility: CLINIC | Age: 65
End: 2025-04-29
Payer: MEDICARE

## 2025-04-29 VITALS
BODY MASS INDEX: 32.62 KG/M2 | HEIGHT: 65 IN | OXYGEN SATURATION: 95 % | DIASTOLIC BLOOD PRESSURE: 80 MMHG | TEMPERATURE: 97.3 F | HEART RATE: 75 BPM | SYSTOLIC BLOOD PRESSURE: 148 MMHG

## 2025-04-29 DIAGNOSIS — J30.2 SEASONAL ALLERGIES: ICD-10-CM

## 2025-04-29 DIAGNOSIS — R10.11 ABDOMINAL WALL PAIN IN RIGHT UPPER QUADRANT: ICD-10-CM

## 2025-04-29 LAB
POC APPEARANCE, URINE: CLEAR
POC BILIRUBIN, URINE: NEGATIVE
POC BLOOD, URINE: ABNORMAL
POC COLOR, URINE: YELLOW
POC GLUCOSE, URINE: NEGATIVE MG/DL
POC KETONES, URINE: NEGATIVE MG/DL
POC LEUKOCYTES, URINE: NEGATIVE
POC NITRITE,URINE: NEGATIVE
POC PH, URINE: 6.5 PH
POC PROTEIN, URINE: NEGATIVE MG/DL
POC SPECIFIC GRAVITY, URINE: 1.01
POC UROBILINOGEN, URINE: 0.2 EU/DL

## 2025-04-29 PROCEDURE — 99214 OFFICE O/P EST MOD 30 MIN: CPT | Performed by: FAMILY MEDICINE

## 2025-04-29 PROCEDURE — 81003 URINALYSIS AUTO W/O SCOPE: CPT | Performed by: FAMILY MEDICINE

## 2025-04-29 RX ORDER — ONDANSETRON 4 MG/1
TABLET, ORALLY DISINTEGRATING ORAL
COMMUNITY
Start: 2024-12-26

## 2025-04-29 RX ORDER — PANTOPRAZOLE SODIUM 40 MG/1
40 TABLET, DELAYED RELEASE ORAL 2 TIMES DAILY
Qty: 180 TABLET | Refills: 3 | Status: SHIPPED | OUTPATIENT
Start: 2025-04-29 | End: 2026-04-29

## 2025-04-29 RX ORDER — FLUTICASONE PROPIONATE 50 MCG
1 SPRAY, SUSPENSION (ML) NASAL DAILY
Qty: 16 G | Refills: 11 | Status: SHIPPED | OUTPATIENT
Start: 2025-04-29 | End: 2026-04-29

## 2025-04-29 RX ORDER — LIDOCAINE 50 MG/G
1 PATCH TOPICAL DAILY
Qty: 30 PATCH | Refills: 1 | Status: SHIPPED | OUTPATIENT
Start: 2025-04-29 | End: 2026-04-29

## 2025-04-29 RX ORDER — CARISOPRODOL 350 MG/1
350 TABLET ORAL NIGHTLY PRN
Qty: 20 TABLET | Refills: 0 | Status: SHIPPED | OUTPATIENT
Start: 2025-04-29 | End: 2025-06-28

## 2025-04-29 ASSESSMENT — PAIN SCALES - GENERAL: PAINLEVEL_OUTOF10: 10-WORST PAIN EVER

## 2025-04-29 NOTE — PROGRESS NOTES
"Subjective   Patient ID: Vince P Lanese is a 64 y.o. male who presents for Rib Injury (Rib pain right sided x 2 weeks.   No injury, has had this pain in the past/Urine- patient requested urinalysis today ? Prostate issue?).    HPI here for follow-up to the recurring right upper quadrant abdominal pain.  Patient has been to GI for this and there is no concrete diagnosis.  Patient states that the pain is lancinating but it is intermittent.  He gets relief by laying in the bathtub.  He has follow-up with GI in the near future.  Patient has a history of prostate disease.  He is been seen by urology for this on multiple occasions.  Patient has significant seasonal allergies.  He uses nasal steroid spray with moderate relief during the spring and summer.    Review of Systems  Constitutional: Patient is negative for fever, fatigue, weight change.  HEENT: Patient is positive for sneezing and runny nose during allergy season.  Patient is negative for change in vision, hearing, swallow.  Cardio: Patient is negative for chest pain, lower extremity edema.  Pulmonary: Patient is negative for cough, shortness of breath.  Gastrointestinal: Patient is positive for right upper quadrant abdominal pain intermittent.    Objective   /80 (BP Location: Left arm, BP Cuff Size: Adult)   Pulse 75   Temp 36.3 °C (97.3 °F) (Temporal)   Ht 1.651 m (5' 5\")   SpO2 95%   BMI 32.62 kg/m²     Physical Exam  General: Awake and alert no apparent distress.  HEENT: Moist oral mucosa no cervical lymphadenopathy.  Cardio: Heart S1-S2 no murmur rub or gallop.  Pulmonary: Lungs clear to auscultation bilaterally.  Assessment/Plan   Problem List Items Addressed This Visit           ICD-10-CM    Seasonal allergies needs better control.  Refill fluticasone for daily use. J30.2    Relevant Medications    fluticasone (Flonase) 50 mcg/actuation nasal spray     Other Visit Diagnoses         Codes      Abdominal wall pain in right upper quadrant    needs " better control.  Will treat this is if it is musculoskeletal: Patient be given lidocaine patches to try and will also be given Soma. R10.11    Relevant Medications    lidocaine (Lidoderm) 5 % patch    carisoprodol (Soma) 350 mg tablet    Other Relevant Orders    POCT UA Automated manually resulted (Completed)

## 2025-05-02 ENCOUNTER — TELEPHONE (OUTPATIENT)
Dept: PRIMARY CARE | Facility: CLINIC | Age: 65
End: 2025-05-02
Payer: MEDICARE

## 2025-05-08 DIAGNOSIS — R10.11 ABDOMINAL WALL PAIN IN RIGHT UPPER QUADRANT: Primary | ICD-10-CM

## 2025-05-08 DIAGNOSIS — F41.9 ANXIETY: ICD-10-CM

## 2025-05-08 RX ORDER — DIAZEPAM 5 MG/1
5 TABLET ORAL NIGHTLY PRN
Qty: 5 TABLET | Refills: 0 | Status: SHIPPED | OUTPATIENT
Start: 2025-05-08 | End: 2025-08-06

## 2025-05-11 ENCOUNTER — APPOINTMENT (OUTPATIENT)
Dept: RADIOLOGY | Facility: HOSPITAL | Age: 65
End: 2025-05-11
Payer: MEDICARE

## 2025-05-11 DIAGNOSIS — R10.11 ABDOMINAL WALL PAIN IN RIGHT UPPER QUADRANT: Primary | ICD-10-CM

## 2025-05-16 ENCOUNTER — HOSPITAL ENCOUNTER (OUTPATIENT)
Dept: RADIOLOGY | Facility: HOSPITAL | Age: 65
Discharge: HOME | End: 2025-05-16
Payer: MEDICARE

## 2025-05-16 DIAGNOSIS — R10.11 ABDOMINAL WALL PAIN IN RIGHT UPPER QUADRANT: ICD-10-CM

## 2025-05-16 PROCEDURE — 74183 MRI ABD W/O CNTR FLWD CNTR: CPT

## 2025-05-16 PROCEDURE — 2550000001 HC RX 255 CONTRASTS: Mod: JW | Performed by: FAMILY MEDICINE

## 2025-05-16 PROCEDURE — A9575 INJ GADOTERATE MEGLUMI 0.1ML: HCPCS | Mod: JW | Performed by: FAMILY MEDICINE

## 2025-05-16 RX ORDER — GADOTERATE MEGLUMINE 376.9 MG/ML
18 INJECTION INTRAVENOUS
Status: COMPLETED | OUTPATIENT
Start: 2025-05-16 | End: 2025-05-16

## 2025-05-16 RX ADMIN — GADOTERATE MEGLUMINE 18 ML: 376.9 INJECTION INTRAVENOUS at 18:08

## 2025-06-10 ENCOUNTER — OFFICE VISIT (OUTPATIENT)
Dept: ORTHOPEDIC SURGERY | Facility: CLINIC | Age: 65
End: 2025-06-10
Payer: MEDICARE

## 2025-06-10 VITALS
HEIGHT: 65 IN | DIASTOLIC BLOOD PRESSURE: 88 MMHG | WEIGHT: 196 LBS | BODY MASS INDEX: 32.65 KG/M2 | HEART RATE: 75 BPM | SYSTOLIC BLOOD PRESSURE: 140 MMHG

## 2025-06-10 DIAGNOSIS — S86.811A STRAIN OF CALF MUSCLE, RIGHT, INITIAL ENCOUNTER: ICD-10-CM

## 2025-06-10 DIAGNOSIS — M25.561 RIGHT KNEE PAIN, UNSPECIFIED CHRONICITY: ICD-10-CM

## 2025-06-10 DIAGNOSIS — M76.51 PATELLAR TENDINITIS OF RIGHT KNEE: ICD-10-CM

## 2025-06-10 DIAGNOSIS — S76.111A QUADRICEPS STRAIN, RIGHT, INITIAL ENCOUNTER: ICD-10-CM

## 2025-06-10 DIAGNOSIS — M17.11 PRIMARY OSTEOARTHRITIS OF RIGHT KNEE: Primary | ICD-10-CM

## 2025-06-10 DIAGNOSIS — S83.91XA SPRAIN OF RIGHT KNEE, INITIAL ENCOUNTER: ICD-10-CM

## 2025-06-10 DIAGNOSIS — M22.8X1 MALTRACKING OF RIGHT PATELLA: ICD-10-CM

## 2025-06-10 DIAGNOSIS — M70.51 PATELLAR BURSITIS OF RIGHT KNEE: ICD-10-CM

## 2025-06-10 PROBLEM — S86.819A STRAIN OF CALF MUSCLE: Status: ACTIVE | Noted: 2025-06-10

## 2025-06-10 PROCEDURE — 99215 OFFICE O/P EST HI 40 MIN: CPT | Performed by: NURSE PRACTITIONER

## 2025-06-10 PROCEDURE — 20610 DRAIN/INJ JOINT/BURSA W/O US: CPT | Performed by: NURSE PRACTITIONER

## 2025-06-10 RX ORDER — OLOPATADINE HYDROCHLORIDE 1 MG/ML
1 SOLUTION OPHTHALMIC 2 TIMES DAILY
COMMUNITY
Start: 2025-06-08 | End: 2025-07-08

## 2025-06-10 RX ORDER — SUCRALFATE 1 G/1
1 TABLET ORAL 4 TIMES DAILY
COMMUNITY

## 2025-06-10 RX ORDER — PREDNISONE 10 MG/1
TABLET ORAL
Qty: 30 TABLET | Refills: 0 | Status: SHIPPED | OUTPATIENT
Start: 2025-06-10

## 2025-06-10 RX ORDER — CIPROFLOXACIN 500 MG/1
500 TABLET, FILM COATED ORAL 2 TIMES DAILY
COMMUNITY
Start: 2025-02-18

## 2025-06-10 RX ORDER — DICYCLOMINE HYDROCHLORIDE 10 MG/1
10 CAPSULE ORAL AS NEEDED
COMMUNITY
Start: 2025-05-28

## 2025-06-10 ASSESSMENT — ENCOUNTER SYMPTOMS
RESPIRATORY NEGATIVE: 1
DEPRESSION: 0
OCCASIONAL FEELINGS OF UNSTEADINESS: 0
ARTHRALGIAS: 1
MYALGIAS: 1
CONSTITUTIONAL NEGATIVE: 1
LOSS OF SENSATION IN FEET: 0
CARDIOVASCULAR NEGATIVE: 1

## 2025-06-10 ASSESSMENT — PAIN SCALES - GENERAL: PAINLEVEL_OUTOF10: 7

## 2025-06-10 NOTE — PATIENT INSTRUCTIONS
May use PRICE therapy as needed.  Start into Physical Therapy 1-2 times a week for 8-10 weeks with manual therapy as well as dry needling and IASTM  Stressed the importance of wearing shoes with good stability control to help with the biomechanics affecting the lower legs  Stressed the importance of wearing full foot insoles to help with the biomechanics affecting the lower legs  Recommendation over-the-counter calcium 500mg, 3 times a day with vitamin-D3 3821-9275+ units a day with food as well as a daily multivitamin  Recommendation over-the-counter Move Free for joint health  May take OTC Tylenol Extra Strength or OTC Tylenol Arthritis, taking one every 6-8 hours with food as needed for pain management.  Patient advised regarding the risk and/or potential adverse reactions and/or side effects of any prescribed medications along with any over-the-counter medications or any supplements used. Patient advised to seek immediate medical care if any adverse reactions occur. The patient and/or patient(s) parent(s) verbalized their understanding.  Discussed in detail with the patient to the level of their understanding the possibility in the future of regenerative injections versus corticosteroids injections  Possibility in future of MRI of RIGHT KNEE to rule out tendon vs ligament vs tear vs fracture vs other, MSK to read.   Follow up 6-8 weeks

## 2025-06-10 NOTE — PROGRESS NOTES
"Established patient  History Of Present Illness  Vincent P Lanese \"Bill\" is a 64 y.o. male presenting with RIGHT knee pain. Pt states that he has had an increase in his knee pain again over the past week starting at last Wednesday. It started as a random \"twinge\" after he was doing rehabilitation from his LEFT foot rehab from his surgery with Dr. Bardales. Pain with all range of motion. Notes that pain is all the same from his last visit in office two years ago. Notes that he has a twelve hour car ride coming up as well as being at a festival over multiple days for fourteen hour days. Presents wearing REACTION knee brace as previously given, however notes that it is bulky cumbersome and irritating. Denies any numbness or tingling. Rates current pain as a 7/10 and states that at worst a 10+/10. Hoping that he can get either injections and/or a prescription due to GI issues with NSAIDs.  We discussed treatment options.  Upon exam patient has indications of a flare of his degenerative joint disease as well as his patellar tendinitis patellar maltracking.  After discussion we will have patient start into physical therapy as soon as possible and we will treat with anti-inflammatory therapy including oral prednisone as well as a cortisone injection.  Patient can follow-up in 6 to 8 weeks for reevaluation for reevaluation and we can consider further treatment options at that time.    Past Medical History  He has a past medical history of Anxiety, Class 1 obesity with body mass index (BMI) of 31.0 to 31.9 in adult (09/13/2024), Hyperlipidemia, Insomnia, Melena, and Tick bite of left thigh, sequela (09/04/2024).    Surgical History  He has a past surgical history that includes Colonoscopy (10/26/2021); Colonoscopy (01/01/2014); and Colonoscopy (12/2024).     Social History  He reports that he has been smoking cigarettes. He has a 10 pack-year smoking history. He has been exposed to tobacco smoke. He has never used smokeless " "tobacco. He reports that he does not currently use alcohol. He reports that he does not use drugs.    Family History  Family History[1]     Allergies  Ketorolac    Review of Systems  Review of Systems   Constitutional: Negative.    Respiratory: Negative.     Cardiovascular: Negative.    Musculoskeletal:  Positive for arthralgias and myalgias.   All other systems reviewed and are negative.     Last Recorded Vitals  /88 (BP Location: Right arm, Patient Position: Sitting, BP Cuff Size: Adult)   Pulse 75   Ht 1.651 m (5' 5\")   Wt 88.9 kg (196 lb)   BMI 32.62 kg/m²      The , WASHINGTON BRAVO was present during today's visit and not limited to physical examination!    Examination:  Right Knee  Edema: Positive Diffusely.   Effusion: Negative.   Percussion Test:  Negative.   Tuning Fork Test:  Negative.   Ecchymosis/Bruising: Negative.            Palpation:   Positive Tender to palpation anterior aspect of the knee, patellar tendon    Orientation:  Asymmetrical: because of the swelling.            Range of Motion:   Knee Flexion ( 0-135 degrees)    Knee Extension ( 0-15 degrees)         Muscle Strength: Positive   +4/+5 Quadricep Extension Causes pain  +4/+5  Hamstring Flexion Causes pain          +5+5 Gastrocnemius  +5+5 Soleus.            Proprioception:        Pain with Squat: Positive    Pain with Sumo Squat:  Negative  Hop Test: Positive    Single leg balance test Negative           Vascular:   +2/+4 Femoral  +2/+4 Dorsalis Pedis  +2/+4 Posterior Tibial  Capillary Refill less than 2 seconds.     Knee - ACL:  Anterior Drawer Test: Negative  Lachman Test: Negative  Lelli Test:  Negative      KNEE - MCL / LCL:  Valgus Stress Test:  90 degrees: Negative, 20-30 degrees: Negative.   Varus Stress Test:  90 degrees: Negative 20-30 degrees: Negative  Apley Distraction Test: Negative  Thessaly Test: Negative      KNEE - IT BAND:  Forrest Test: Negative.   Noble Test: Negative.         KNEE - MENISCUS:  Apley " "Compression Test: Negative   Stienmann Test:  Negative  Joy Test: Negative  Bounce Home Test:  Negative  Thessaly Test:  Negative           KNEE - PATELLA:  Apprehension Test:  Positive   Glide Test:  Positive   Grind Test: Positive   Patella Tracking Test: Positive     KNEE - PCL/POSTERIOR LATERAL CORNER:  Posterior Drawer Test: Negative  Dial Test: Negative  Reverse Lachman Test: Negative     KNEE - POPLITEUS:  Bereket's Test: Negative              KNEE - QUADRICEPS:    VMO Test: Positive    VLO Test:  Negative.         Hip/Pelvis - Sacrum:  Leg Length Supine: Negative  Leg Length Supine to Seated (Derbolowsky Sign): Negative  Standing Flexion Test: Negative  Seated Flexion Test: Negative  Spring Test: Negative  Sacral Somatic Dysfunction: Negative  Hip Flexor Tightness: Positive   Hamstring Tightness: Positive         Feet/Foot: Positive BILATERAL Valgus foot     Imaging and Diagnostics Review:  Plain radiographs ordered but not completed at time of visit.    Assessment   1. Primary osteoarthritis of right knee    2. Sprain of right knee, initial encounter    3. Maltracking of right patella    4. Patellar bursitis of right knee    5. Patellar tendinitis of right knee    6. Right knee pain, unspecified chronicity    7. Quadriceps strain, right, initial encounter    8. Strain of calf muscle, right, initial encounter      L Inj/Asp on 6/11/2025 7:36 AM  Indications: pain  Details: 22 G needle, anteromedial approach  Outcome: tolerated well, no immediate complications  Procedure, treatment alternatives, risks and benefits explained, specific risks discussed. Immediately prior to procedure a time out was called to verify the correct patient, procedure, equipment, support staff and site/side marked as required.           Procedure   Time Out (5 Minutes): Yes  Patient Name: Vincent P Lanese \"Bill\"  YOB: 1960  Procedure:  Corticosteroid injection  Needed Supplies Available: Correct " Supplies  Laterality: Right KNEE  Site Verified and Marked: Correct Site(s) Marked  Timeout Performed by Provider: Ronaldo Underwood CNP  Staff Initials: EE    Patient is informed and consent has been signed by the patient if over 18 years old., Patient parent and/or legal guardian is informed and consent has been signed., Patient and/or parent and/or legal guardian accept all risks, benefits, and possible complications associated with procedure(s) and/or manipulation(s) and/or injection(s)., Patient and/or parent and/or legal guardian deny patient allergy or known complications with any of the medications, instruments, products, and/or techniques used., All questions and concerns were answered and/or addressed with the patient and/or parent and/or legal guardian., The patient and/or parent and/or legal guardian agree to proceed with the procedure(s) and/or manipulation(s) and/or injection(s)., Prep: The area was cleansed with a mixture of equal parts rubbing alcohol 70% and Hibclens., Utilizing clean technique, the injection site(s) were marked with a skin marker., and Injection site(s) were anesthestized with topical analgesic spray prior to injection.    Performed corticosteroid injection into the patients Right knee, under ultrasound.  Corticosteroid mixture of 3cc 2% Lidocaine, 3cc 2% Bupivacaine, 2cc Celestone, 1cc Dexamethasone    Band-aid and/or compressive bandage was placed over the injection site(s). After the injection(s) performed osteopathic manipulation therapy to the affected area(s) to help increase blood flow to aid with the healing process as well as circulation of the medication. The patient tolerated the procedure well without any complications. The patient was instructed to gently massage the treatment site(s) as well as to apply moist heat to the affected area(s). Additionally, the patient was instructed to contact the office immediately with any complications or concerns.    The athletic   WASHINGTON CORDON were present in the room during the entire procedure.    The following discharge instructions were reviewed in detail with the patient to the level of their understanding:    PAIN:  A mild to moderate amount of discomfort, tenderness, stiffness, and achiness-caused by the area injected can be expected for a few days after the injection.     SKIN: Due to the numbing spray used, you may develop a red, itchy, scaly rash in the area that was sprayed. Should your skin become itchy, wash area with mild soap and warm water. If needed, you may apply topical, over-the-counter Hydrocortisone cream to alleviate any itchiness. AVOID scratching due to risk of infection.    BATHING/SWIMMING: You may shower after your procedure with regular soap and water, but no baths, no swimming, and no hot tubs for 3-4 days after the procedure due to risk of infection.    ACTIVITIES:  Immediately following the injection, you may resume daily activities (such as work) and light exercise. We suggest that you refrain from strenuous activity and heavy lifting, particularly the injured body part, for a week post-procedure, but sometimes this can change as well.    MOIST HEAT/ICE:  Moist heat or ice may be used on the injection area.     MEDICATION: You may continue your prescribed medications, over the counter medications or supplements, Aspirin, and/or any anti-inflammatories (Motrin, Advil, Aleve, Ibuprofen, Naproxen etc.). Tylenol Extra Strength, Tylenol Arthritis, or any prescribed narcotics or muscle relaxants are OK to take also.    APPOINTMENTS: You should have a follow-up appointment with Dr. Spain scheduled 1-3 weeks as recommended after your procedure. Please schedule your follow-up appointment on your way out after your procedure, or call the office to schedule these appointments as soon as possible.    PRECAUTIONS: Early, rare post procedure problems that can be concerning are as follows: an increase in pain not  relieved by medication (over the counter or prescription), a temperature above 101 degrees, excessive bleeding or progressive, extreme swelling, or numbness.     CALL THE OFFICE OR GO TO THE EMERGENCY ROOM IF ANY OF THESE SYMPTOMS OCCUR.   If you have any questions or problems, please call our office at [phone]     Plan   Treatment or Intervention:  May use PRICE therapy as needed.  Start into Physical Therapy 1-2 times a week for 8-10 weeks with manual therapy as well as dry needling and IASTM  Stressed the importance of wearing shoes with good stability control to help with the biomechanics affecting the lower legs  Stressed the importance of wearing full foot insoles to help with the biomechanics affecting the lower legs  Recommendation over-the-counter calcium 500mg, 3 times a day with vitamin-D3 0364-1455+ units a day with food as well as a daily multivitamin  Recommendation over-the-counter Move Free for joint health  May take OTC Tylenol Extra Strength or OTC Tylenol Arthritis, taking one every 6-8 hours with food as needed for pain management.  Patient advised regarding the risk and/or potential adverse reactions and/or side effects of any prescribed medications along with any over-the-counter medications or any supplements used. Patient advised to seek immediate medical care if any adverse reactions occur. The patient and/or patient(s) parent(s) verbalized their understanding.  Discussed in detail with the patient to the level of their understanding the possibility in the future of regenerative injections versus corticosteroids injections  Possibility in future of MRI of RIGHT KNEE to rule out tendon vs ligament vs tear vs fracture vs other, MSK to read.     Diagnostic studies:  NEW XRAYs completed during today's visit however not read by radiology by end of visit  ----------------------------  PROCEDURE:  KNEE RT MIN 4 VIEW - TXR  0093  REASON FOR EXAM: ACUTE PAIN OF RIGHT KNEE     ORDERING CLINICIAN:  ANIL PRICE     TECHNIQUE:  Four views of the right knee were performed.     FINDINGS:  No fracture or dislocation is seen. Mild degenerative narrowing of the  patellofemoral compartment. There is trace, if any, suprapatellar knee joint  effusion. No radiopaque foreign body identified.     IMPRESSION:  Mild degenerative change of the right knee with trace, if any, suprapatellar  knee joint effusion.     If symptoms persist, consider MRI for further evaluation.  Dictation workstation:   [id number]     Original Interpreting Physician:   JUAN GUERIN MD  Original Transcribed by/Date: MMODAL Feb 6 2023 10:03A  Original Electronically Signed by/Date: JUAN GUERIN MD Feb 6 2023 10:23A    Activity Instructions, Restrictions, and Accommodations:      Consultations/Referrals:  Physical therapy    Follow-up:  Follow up 6-8 weeks  Total appointment time _30_ minutes. Greater than 50% spent counseling patient on results of physical exam, treatment options as well as results of ordered imaging and treatment for results, need for PT and expected outcomes, as well as discussing possible medications.    Anil Price CNP           [1] No family history on file.

## 2025-06-18 ENCOUNTER — OFFICE VISIT (OUTPATIENT)
Dept: ORTHOPEDIC SURGERY | Facility: CLINIC | Age: 65
End: 2025-06-18
Payer: MEDICARE

## 2025-06-24 ENCOUNTER — EVALUATION (OUTPATIENT)
Dept: PHYSICAL THERAPY | Facility: CLINIC | Age: 65
End: 2025-06-24
Payer: MEDICARE

## 2025-06-24 DIAGNOSIS — M25.561 RIGHT KNEE PAIN, UNSPECIFIED CHRONICITY: ICD-10-CM

## 2025-06-24 DIAGNOSIS — M22.8X1 MALTRACKING OF RIGHT PATELLA: ICD-10-CM

## 2025-06-24 DIAGNOSIS — S83.91XA SPRAIN OF RIGHT KNEE, INITIAL ENCOUNTER: ICD-10-CM

## 2025-06-24 DIAGNOSIS — S76.111A QUADRICEPS STRAIN, RIGHT, INITIAL ENCOUNTER: ICD-10-CM

## 2025-06-24 DIAGNOSIS — M17.11 PRIMARY OSTEOARTHRITIS OF RIGHT KNEE: ICD-10-CM

## 2025-06-24 DIAGNOSIS — S86.811A STRAIN OF CALF MUSCLE, RIGHT, INITIAL ENCOUNTER: ICD-10-CM

## 2025-06-24 DIAGNOSIS — M70.51 PATELLAR BURSITIS OF RIGHT KNEE: ICD-10-CM

## 2025-06-24 DIAGNOSIS — M76.51 PATELLAR TENDINITIS OF RIGHT KNEE: ICD-10-CM

## 2025-06-24 PROCEDURE — 97162 PT EVAL MOD COMPLEX 30 MIN: CPT | Mod: GP | Performed by: PHYSICAL THERAPIST

## 2025-06-24 NOTE — PROGRESS NOTES
"Physical Therapy Evaluation and Treatment    Patient Name: Vincent P Lanese \"Bill\"  MRN: [id number]  Date: 6/24/2025  Time Calculation  Start Time: 0920  Stop Time: 1000  Time Calculation (min): 40 min            INSURANCE:  Visit Number: 1  Insurance Type: Payor: [employer] [facility] MEDICARE / Plan: [employer] [facility] MEDICARE / Product Type: *No Product type* /   Authorization info:  NO AUTH / $30 copay / $3300 OOP not met / [place] visits / MMO MCARE ADV    CMS Re-Certification Period:6/24/25  to 9/22/25    CURRENT PROBLEMS:   1. Sprain of right knee, initial encounter  Referral to Physical Therapy    Follow Up In Physical Therapy      2. Maltracking of right patella  Referral to Physical Therapy    Follow Up In Physical Therapy      3. Patellar bursitis of right knee  Referral to Physical Therapy    Follow Up In Physical Therapy      4. Patellar tendinitis of right knee  Referral to Physical Therapy    Follow Up In Physical Therapy      5. Right knee pain, unspecified chronicity  Referral to Physical Therapy    Follow Up In Physical Therapy      6. Primary osteoarthritis of right knee  Referral to Physical Therapy      7. Quadriceps strain, right, initial encounter  Referral to Physical Therapy    Follow Up In Physical Therapy      8. Strain of calf muscle, right, initial encounter  Referral to Physical Therapy    Follow Up In Physical Therapy          PRECAUTIONS:  Medical:   hernias, kidney cyst  Fall risk: low         PMH: (pertinent to PT evaluation)  Hematuria, leukocytosis, anxiety, DJD,  R shoulder impingement, R glenoid labrum tear, maltracking R patella, R patellar bursitis, r patellar tendonitis, rotator cuff tear, L foot pain, OA of spine with radiculopathy, SOB , smoker  *See Epic EMR for complete list.    Medical History Form: Reviewed (scanned into chart)      HPI  per doctor note:  (6-10-25)  \" Vincent P Lanese \"Bill\" is a 64 y.o. male presenting with RIGHT knee pain. Pt states that he has had " "an increase in his knee pain again over the past week starting at last Wednesday. It started as a random \"twinge\" after he was doing rehabilitation from his LEFT foot rehab from his surgery with Dr. Bardales. Pain with all range of motion. Notes that pain is all the same from his last visit in office two years ago. Notes that he has a twelve hour car ride coming up as well as being at a festival over multiple days for fourteen hour days. Presents wearing REACTION knee brace as previously given, however notes that it is bulky cumbersome and irritating. Denies any numbness or tingling. Rates current pain as a 7/10 and states that at worst a 10+/10. Hoping that he can get either injections and/or a prescription due to GI issues with NSAIDs.  We discussed treatment options.  Upon exam patient has indications of a flare of his degenerative joint disease as well as his patellar tendinitis patellar maltracking.  After discussion we will have patient start into physical therapy as soon as possible and we will treat with anti-inflammatory therapy including oral prednisone as well as a cortisone injection.  Patient can follow-up in 6 to 8 weeks for reevaluation for reevaluation and we can consider further treatment options at that time.           \"    SPECIAL CONCERNS:   Has access to a pool    SUBJECTIVE  He has had along history for L foot issues and favors the L which irritates the R knee.   He had he had 2 Cortizone shots. He is tapered form the prednisone pack.  He had 10/10 pain at the time of the shot.  He did a lot of walking including on hills .  He does not have confidence in the knee and has to plan out  his movement.  He feels his R leg is very weak and tight    PAIN:    Current  1 /10  RANGE: 0 /10  to  4  /10  Location: R knee  Description: throbbing   Aggravating: sudden and unexpected movements, pivoting  Reducing: brace, ice, elevation, heat, compression and stretching.         OUTCOME MEASURE  Other " Measures  30 Second Sit to Stand: 9  Other Outcome Measures: WOMAC   30       HOME LIVING:  The pt lives in a 1 story home with small  steps to enter.    PRIOR LEVEL OF FUNCTION  Uses a cane in the comminty if a long distance or crowded,      LEARNING PREFERENCE:  Pt agreed to varied teaching methods including verbal, written (for HEP), demonstration, and monitored practice.      OBJECTIVE:  Lower Extremity     ROM (in degrees)           Right        Left   Hip flexion           90+           90+   Hip extension      7    20   Knee flex              135      WFL   Knee ext                0          WFL   Dorsiflexion             20            17         MMT:    (out of 5)           Right        Left   Hip flexion            4+            4+   Hip extension         5       5   Hip abduction              4       5   Hip IR            5            4+   Hip ER              5           4+   Knee flex              4+             4+   Knee ext              5             5   Dorsiflexion             5              5   Plantarflexion                5             5       SPECIAL TESTS        Knee:    R  Anterior Drawer            ( - )             Posterior Drawer           ( - )             Valgus stress           ( - )             Verus stress           ( - )           Patellar Grind             ( - )                 FLEXIBILTY:                         R                      L  SKTC                                ( - )                   ( - )  Hamstrings (at 90/90)       -38   degrees             - 40    degrees    Quads      100 degrees         112 degrees      EDEMA:   R                 L  Infrapatellar  36.0    cm 36.0   cm      PALPATION  Tenderness noted around the knee cap with mild effusion      BED MOBILITY:  Sit to supine:   independent  Supine to sit:  independent    TRANSFERS:  Sit to stand:   independent  Stand to sit:   independent  30 seconds sit to stand test: 9    GAIT  Distance: 30 ft observed  Assistive  device:  none  Assistance needed: none  Deviations:  +grossly symmetrical  +Guarded  +NBOS      BALANCE:    4 Stage balance test:   (-) for falls     Foot position              Time in seconds   Feet together              NT   R 1/2 tandem               NT   L 1/2 tandem               NT   R tandem              *30   L tandem                30   R SLS              *30   L SLS              23   *notable instability in these positions with significant sway  Tested without knee brace     TREATMENTS:  EDUCATION:  Pt educated in:  current status, general goals, treatment plan  Edema control   safe use cold packs    ASSESSMENT  Pt is a 64 y.o. Male who presents with impairments of R knee  pain, edema, reduced strength, reduced ROM/flexibility, and soft tissue restriction    These impairments have led to functional limitations including   difficulty with transfers, standing, stairs, reduced balance dressing,  housework, and  walking.     Pt would benefit from skilled physical therapy intervention to improve above impairments and facilitate return to function.    Complexity of Evaluation:   moderate  Based on the history including personal factors and/or comorbidities, examination of body systems including body structures and function, activity limitations, and/or participation restrictions, as well as clinical presentation, patient meets criteria for a moderate complexity evaluation. L foot complications fro years and hernia issues      Rehab potential:  excellent    GOALS:  Pt stated goal:  Knee improvement and leg strengthen in both legs     Long term goals:  + Increase knee ROM to 0-120 degrees as needed for stairs and transfers  + Increase B LE strength to 5/5  as needed for stairs, community mobility and house cleaning  + Reduce edema for improved mobility and reduced pain  + Ambulate community distances without deviation on level and advanced surfaces for full community reintegration  + Able to ascend and descend 8  inch steps with good eccentric control   + Improve LE flexibility for reduced stress on joints and spine  + good balance on compliant surfaces  + Independent in a HEP for self-management of any further limitations from prior level of function.  + Decrease pain to <3/10  + Able to perform sit to stand with no increased pain.   + improved functional strength with 30 second  sit to stand = 15 reps  Pt to report greater confidence in his knee without knee brace      PLAN     Skilled physical therapy 2 times per week for 4-6 weeks  Treatment may include:  Therapeutic Exercise (44502)  Therapeutic Activity (93914)  Manual therapy (82260)  Neuro-muscular re-education (66971)  Gait Training (27375)  Ultrasound (16343)  Unattended Electrical Stimulation (/72001)  Attended Electrical Stimulation (89807)  Light therapy (31442)      The pt agreed with above stated treatment plan and general goals.    Some of This note was created using voice recognition software and was not corrected for typographical or grammatical errors.

## 2025-06-30 ENCOUNTER — TELEPHONE (OUTPATIENT)
Dept: SURGERY | Facility: CLINIC | Age: 65
End: 2025-06-30

## 2025-06-30 ENCOUNTER — TREATMENT (OUTPATIENT)
Dept: PHYSICAL THERAPY | Facility: CLINIC | Age: 65
End: 2025-06-30
Payer: MEDICARE

## 2025-06-30 DIAGNOSIS — S76.111A QUADRICEPS STRAIN, RIGHT, INITIAL ENCOUNTER: ICD-10-CM

## 2025-06-30 DIAGNOSIS — M70.51 PATELLAR BURSITIS OF RIGHT KNEE: ICD-10-CM

## 2025-06-30 DIAGNOSIS — M22.8X1 MALTRACKING OF RIGHT PATELLA: ICD-10-CM

## 2025-06-30 DIAGNOSIS — M25.561 RIGHT KNEE PAIN, UNSPECIFIED CHRONICITY: ICD-10-CM

## 2025-06-30 DIAGNOSIS — M76.51 PATELLAR TENDINITIS OF RIGHT KNEE: ICD-10-CM

## 2025-06-30 DIAGNOSIS — S86.811A STRAIN OF CALF MUSCLE, RIGHT, INITIAL ENCOUNTER: ICD-10-CM

## 2025-06-30 DIAGNOSIS — S83.91XA SPRAIN OF RIGHT KNEE, INITIAL ENCOUNTER: ICD-10-CM

## 2025-06-30 PROCEDURE — 97014 ELECTRIC STIMULATION THERAPY: CPT | Mod: GP,CQ

## 2025-06-30 PROCEDURE — 97110 THERAPEUTIC EXERCISES: CPT | Mod: GP,CQ

## 2025-06-30 NOTE — PROGRESS NOTES
"Physical Therapy  Treatment    Patient Name: Vincent P Lanese \"[place]"  MRN: [id number]  Date: 6/30/2025  Time Calculation  Start Time: 1100  Stop Time: 1140  Time Calculation (min): 40 min    PT Modalities Time Entry  E-Stim (Unattended) Time Entry: 10  PT Therapeutic Procedures Time Entry  Therapeutic Exercise Time Entry: 30    INSURANCE:  Visit Number: 2  Insurance Type: Payor: MEDICAL [facility] MEDICARE / Plan: MEDICAL [facility] MEDICARE / Product Type: *No Product type* /   Authorization info:  NO AUTH / $30 copay / $3300 OOP not met / [place] visits / [facility] ADV    CMS Re-Certification Period:6/24/25  to 9/22/25    CURRENT PROBLEMS:   1. Sprain of right knee, initial encounter  Referral to Physical Therapy    Follow Up In Physical Therapy      2. Maltracking of right patella  Referral to Physical Therapy    Follow Up In Physical Therapy      3. Patellar bursitis of right knee  Referral to Physical Therapy    Follow Up In Physical Therapy      4. Patellar tendinitis of right knee  Referral to Physical Therapy    Follow Up In Physical Therapy      5. Right knee pain, unspecified chronicity  Referral to Physical Therapy    Follow Up In Physical Therapy      6. Primary osteoarthritis of right knee  Referral to Physical Therapy      7. Quadriceps strain, right, initial encounter  Referral to Physical Therapy    Follow Up In Physical Therapy      8. Strain of calf muscle, right, initial encounter  Referral to Physical Therapy    Follow Up In Physical Therapy          PRECAUTIONS:  Medical:   hernias, kidney cyst  Fall risk: low         PMH: (pertinent to PT evaluation)  Hematuria, leukocytosis, anxiety, DJD,  R shoulder impingement, R glenoid labrum tear, maltracking R patella, R patellar bursitis, r patellar tendonitis, rotator cuff tear, L foot pain, OA of spine with radiculopathy, SOB , smoker  *See Epic EMR for complete list.    Medical History Form: Reviewed (scanned into chart)      HPI  per doctor note:  " "(6-10-25)  \" Vincent P Lanese \"Bill\" is a 64 y.o. male presenting with RIGHT knee pain. Pt states that he has had an increase in his knee pain again over the past week starting at last Wednesday. It started as a random \"twinge\" after he was doing rehabilitation from his LEFT foot rehab from his surgery with Dr. Bardales. Pain with all range of motion. Notes that pain is all the same from his last visit in office two years ago. Notes that he has a twelve hour car ride coming up as well as being at a festival over multiple days for fourteen hour days. Presents wearing REACTION knee brace as previously given, however notes that it is bulky cumbersome and irritating. Denies any numbness or tingling. Rates current pain as a 7/10 and states that at worst a 10+/10. Hoping that he can get either injections and/or a prescription due to GI issues with NSAIDs.  We discussed treatment options.  Upon exam patient has indications of a flare of his degenerative joint disease as well as his patellar tendinitis patellar maltracking.  After discussion we will have patient start into physical therapy as soon as possible and we will treat with anti-inflammatory therapy including oral prednisone as well as a cortisone injection.  Patient can follow-up in 6 to 8 weeks for reevaluation for reevaluation and we can consider further treatment options at that time.           \"    SPECIAL CONCERNS:   Has access to a pool    SUBJECTIVE: No c/o discomfort this date    Per evaluation:  He has had along history for L foot issues and favors the L which irritates the R knee.   He had he had 2 Cortizone shots. He is tapered form the prednisone pack.  He had 10/10 pain at the time of the shot.  He did a lot of walking including on hills .  He does not have confidence in the knee and has to plan out  his movement.  He feels his R leg is very weak and tight    PAIN:    Current  1 /10  RANGE: 0 /10  to  4  /10  Location: R knee  Description: throbbing "   Aggravating: sudden and unexpected movements, pivoting  Reducing: brace, ice, elevation, heat, compression and stretching.         HOME LIVING:  The pt lives in a 1 story home with small  steps to enter.    PRIOR LEVEL OF FUNCTION  Uses a cane in the comminty if a long distance or crowded,      LEARNING PREFERENCE:  Pt agreed to varied teaching methods including verbal, written (for HEP), demonstration, and monitored practice.      OBJECTIVE:    GAIT  Distance: 30 ft observed  Assistive device:  none  Assistance needed: none  Deviations:  +grossly symmetrical  +Guarded  +NBOS    TREATMENTS:  Seated LE exercise for strengthening   LAQ 1 x10 with 2 #  Hamstring curl  1 x10  Abduction with GTB theraband  1 x10  ball squeeze  1 x10  Slant board stretch  Hamstring stretch    Standing:  with UE support for strengthening:  Hip flexion/marching   1 x10 with 2 #  Hip abduction 1 x10 with 2 #  Hamstring curl 1 x10 with 2 #  Small squat 2 x10   Toe raise1 x10   Slant board stretch    Supine exercise for LE strengthening:  +SLR 1 x 10 with 0 #  +Heel slide 1 x10 with 0 #  +SAQ 1 x10 with 2 #  + Quad Set 1 x10 with 3 second hold     Modalities:  Unattended electrical stimulation:  Interferential current  Parameters:  80 to 150 Hz  Patient position:  Supine  Electrodes placed:  surrounding R knee with CP  Comment:  skin check good      EDUCATION:  Pt educated in:  current status, general goals, treatment plan  Edema control   safe use cold packs    ASSESSMENT:  Performed all ex well ,  cont to wear  R knee  compression pull on support , no c/o discomfort while performing ex    Rehab potential:  excellent    GOALS:  Pt stated goal:  Knee improvement and leg strengthen in both legs     Long term goals:  + Increase knee ROM to 0-120 degrees as needed for stairs and transfers  + Increase B LE strength to 5/5  as needed for stairs, community mobility and house cleaning  + Reduce edema for improved mobility and reduced pain  +  Ambulate community distances without deviation on level and advanced surfaces for full community reintegration  + Able to ascend and descend 8 inch steps with good eccentric control   + Improve LE flexibility for reduced stress on joints and spine  + good balance on compliant surfaces  + Independent in a HEP for self-management of any further limitations from prior level of function.  + Decrease pain to <3/10  + Able to perform sit to stand with no increased pain.   + improved functional strength with 30 second  sit to stand = 15 reps  Pt to report greater confidence in his knee without knee brace      PLAN     Skilled physical therapy 2 times per week for 4-6 weeks  Treatment may include:  Therapeutic Exercise (16499)  Therapeutic Activity (94001)  Manual therapy (34301)  Neuro-muscular re-education (03325)  Gait Training (82048)  Ultrasound (83003)  Unattended Electrical Stimulation (/69303)  Attended Electrical Stimulation (24425)  Light therapy (95456)      The pt agreed with above stated treatment plan and general goals.    Some of This note was created using voice recognition software and was not corrected for typographical or grammatical errors.

## 2025-07-02 ENCOUNTER — APPOINTMENT (OUTPATIENT)
Dept: RADIOLOGY | Facility: HOSPITAL | Age: 65
End: 2025-07-02
Payer: MEDICARE

## 2025-07-03 ENCOUNTER — TREATMENT (OUTPATIENT)
Dept: PHYSICAL THERAPY | Facility: CLINIC | Age: 65
End: 2025-07-03
Payer: MEDICARE

## 2025-07-03 DIAGNOSIS — S83.91XA SPRAIN OF RIGHT KNEE, INITIAL ENCOUNTER: Primary | ICD-10-CM

## 2025-07-03 DIAGNOSIS — M25.561 RIGHT KNEE PAIN, UNSPECIFIED CHRONICITY: ICD-10-CM

## 2025-07-03 DIAGNOSIS — S86.811A STRAIN OF CALF MUSCLE, RIGHT, INITIAL ENCOUNTER: ICD-10-CM

## 2025-07-03 DIAGNOSIS — S76.111A QUADRICEPS STRAIN, RIGHT, INITIAL ENCOUNTER: ICD-10-CM

## 2025-07-03 DIAGNOSIS — M22.8X1 MALTRACKING OF RIGHT PATELLA: ICD-10-CM

## 2025-07-03 DIAGNOSIS — M76.51 PATELLAR TENDINITIS OF RIGHT KNEE: ICD-10-CM

## 2025-07-03 DIAGNOSIS — M70.51 PATELLAR BURSITIS OF RIGHT KNEE: ICD-10-CM

## 2025-07-03 PROCEDURE — 97110 THERAPEUTIC EXERCISES: CPT | Mod: GP,CQ

## 2025-07-03 NOTE — PROGRESS NOTES
"Physical Therapy  Treatment    Patient Name: Vincent P Lanese \"[place]\"  MRN: [id number]  Date: 7/3/2025  Time Calculation  Start Time: 1225  Stop Time: 1310  Time Calculation (min): 45 min       PT Therapeutic Procedures Time Entry  Therapeutic Exercise Time Entry: 45    INSURANCE:  Visit Number: 3  Insurance Type: Payor: MEDICAL [facility] MEDICARE / Plan: [facility] MEDICARE / Product Type: *No Product type* /   Authorization info:  NO AUTH / $30 copay / $3300 OOP not met / [place] visits / [facility] ADV    CMS Re-Certification Period:6/24/25  to 9/22/25    CURRENT PROBLEMS:   1. Sprain of right knee, initial encounter  Referral to Physical Therapy    Follow Up In Physical Therapy      2. Maltracking of right patella  Referral to Physical Therapy    Follow Up In Physical Therapy      3. Patellar bursitis of right knee  Referral to Physical Therapy    Follow Up In Physical Therapy      4. Patellar tendinitis of right knee  Referral to Physical Therapy    Follow Up In Physical Therapy      5. Right knee pain, unspecified chronicity  Referral to Physical Therapy    Follow Up In Physical Therapy      6. Primary osteoarthritis of right knee  Referral to Physical Therapy      7. Quadriceps strain, right, initial encounter  Referral to Physical Therapy    Follow Up In Physical Therapy      8. Strain of calf muscle, right, initial encounter  Referral to Physical Therapy    Follow Up In Physical Therapy          PRECAUTIONS:  Medical:   hernias, kidney cyst  Fall risk: low         PMH: (pertinent to PT evaluation)  Hematuria, leukocytosis, anxiety, DJD,  R shoulder impingement, R glenoid labrum tear, maltracking R patella, R patellar bursitis, r patellar tendonitis, rotator cuff tear, L foot pain, OA of spine with radiculopathy, SOB , smoker  *See Epic EMR for complete list.    Medical History Form: Reviewed (scanned into chart)      HPI  per doctor note:  (6-10-25)  \" Vincent P Lanese \"Bill\" is a 64 y.o. male " "presenting with RIGHT knee pain. Pt states that he has had an increase in his knee pain again over the past week starting at last Wednesday. It started as a random \"twinge\" after he was doing rehabilitation from his LEFT foot rehab from his surgery with Dr. Bardales. Pain with all range of motion. Notes that pain is all the same from his last visit in office two years ago. Notes that he has a twelve hour car ride coming up as well as being at a festival over multiple days for fourteen hour days. Presents wearing REACTION knee brace as previously given, however notes that it is bulky cumbersome and irritating. Denies any numbness or tingling. Rates current pain as a 7/10 and states that at worst a 10+/10. Hoping that he can get either injections and/or a prescription due to GI issues with NSAIDs.  We discussed treatment options.  Upon exam patient has indications of a flare of his degenerative joint disease as well as his patellar tendinitis patellar maltracking.  After discussion we will have patient start into physical therapy as soon as possible and we will treat with anti-inflammatory therapy including oral prednisone as well as a cortisone injection.  Patient can follow-up in 6 to 8 weeks for reevaluation for reevaluation and we can consider further treatment options at that time.           \"    SPECIAL CONCERNS:   Has access to a pool    SUBJECTIVE: No c/o discomfort this date, feeling good    Per evaluation:  He has had along history for L foot issues and favors the L which irritates the R knee.   He had he had 2 Cortizone shots. He is tapered form the prednisone pack.  He had 10/10 pain at the time of the shot.  He did a lot of walking including on hills .  He does not have confidence in the knee and has to plan out  his movement.  He feels his R leg is very weak and tight    PAIN:    Current  1 /10  RANGE: 0 /10  to  4  /10  Location: R knee  Description: throbbing   Aggravating: sudden and unexpected " movements, pivoting  Reducing: brace, ice, elevation, heat, compression and stretching.         HOME LIVING:  The pt lives in a 1 story home with small  steps to enter.    PRIOR LEVEL OF FUNCTION  Uses a cane in the comminty if a long distance or crowded,      LEARNING PREFERENCE:  Pt agreed to varied teaching methods including verbal, written (for HEP), demonstration, and monitored practice.      OBJECTIVE:    GAIT  Distance: 30 ft observed  Assistive device:  none  Assistance needed: none  Deviations:  +grossly symmetrical  +Guarded  +NBOS    TREATMENTS:  Seated LE exercise for strengthening   LAQ 1 x10 with 2 #  Hamstring curl  1 x10  Slant board stretch  Hamstring stretch    Standing:  with UE support for strengthening:  Hip flexion/marching   1 x10 with 2 #  Hip abduction 1 x10 with 2 #  Hamstring curl 1 x10 with 2 #  Small squat 2 x10   Toe raise1 x10   Slant board stretch    Bike x 3 min  Leg ext # 10 wt 2 x 10  Hamstring curl # 20 wt 2 x 10    Total gym level 18 2 x 10        EDUCATION:  Pt educated in:  current status, general goals, treatment plan  Edema control   safe use cold packs    ASSESSMENT:  Performed all ex well ,  cont to wear  R knee  compression pull on support , no c/o discomfort while performing ex    Rehab potential:  excellent    GOALS:  Pt stated goal:  Knee improvement and leg strengthen in both legs     Long term goals:  + Increase knee ROM to 0-120 degrees as needed for stairs and transfers  + Increase B LE strength to 5/5  as needed for stairs, community mobility and house cleaning  + Reduce edema for improved mobility and reduced pain  + Ambulate community distances without deviation on level and advanced surfaces for full community reintegration  + Able to ascend and descend 8 inch steps with good eccentric control   + Improve LE flexibility for reduced stress on joints and spine  + good balance on compliant surfaces  + Independent in a HEP for self-management of any further  limitations from prior level of function.  + Decrease pain to <3/10  + Able to perform sit to stand with no increased pain.   + improved functional strength with 30 second  sit to stand = 15 reps  Pt to report greater confidence in his knee without knee brace      PLAN     Skilled physical therapy 2 times per week for 4-6 weeks  Treatment may include:  Therapeutic Exercise (84074)  Therapeutic Activity (35781)  Manual therapy (52169)  Neuro-muscular re-education (45518)  Gait Training (18615)  Ultrasound (54284)  Unattended Electrical Stimulation (/30810)  Attended Electrical Stimulation (07543)  Light therapy (78747)      The pt agreed with above stated treatment plan and general goals.    Some of This note was created using voice recognition software and was not corrected for typographical or grammatical errors.

## 2025-07-07 ENCOUNTER — TREATMENT (OUTPATIENT)
Dept: PHYSICAL THERAPY | Facility: CLINIC | Age: 65
End: 2025-07-07
Payer: MEDICARE

## 2025-07-07 DIAGNOSIS — M70.51 PATELLAR BURSITIS OF RIGHT KNEE: ICD-10-CM

## 2025-07-07 DIAGNOSIS — M25.561 RIGHT KNEE PAIN, UNSPECIFIED CHRONICITY: ICD-10-CM

## 2025-07-07 DIAGNOSIS — S83.91XA SPRAIN OF RIGHT KNEE, INITIAL ENCOUNTER: ICD-10-CM

## 2025-07-07 DIAGNOSIS — M22.8X1 MALTRACKING OF RIGHT PATELLA: ICD-10-CM

## 2025-07-07 DIAGNOSIS — S76.111A QUADRICEPS STRAIN, RIGHT, INITIAL ENCOUNTER: ICD-10-CM

## 2025-07-07 DIAGNOSIS — S86.811A STRAIN OF CALF MUSCLE, RIGHT, INITIAL ENCOUNTER: ICD-10-CM

## 2025-07-07 DIAGNOSIS — M76.51 PATELLAR TENDINITIS OF RIGHT KNEE: ICD-10-CM

## 2025-07-07 PROCEDURE — 97110 THERAPEUTIC EXERCISES: CPT | Mod: GP | Performed by: PHYSICAL THERAPIST

## 2025-07-07 NOTE — PROGRESS NOTES
"Physical Therapy  Treatment    Patient Name: Vincent P Lanese \"Bill\"  MRN: [id number]  Date: 7/7/2025  Time Calculation  Start Time: 0915  Stop Time: 1000  Time Calculation (min): 45 min       PT Therapeutic Procedures Time Entry  Therapeutic Exercise Time Entry: 40    INSURANCE:  Visit Number: 4  Insurance Type: Payor: MEDICAL [employer] [facility] MEDICARE / Plan: [employer] [facility] MEDICARE / Product Type: *No Product type* /   Authorization info:  NO AUTH / $30 copay / $3300 OOP not met / [place] visits / [facility] ADV    CMS Re-Certification Period:6/24/25  to 9/22/25    CURRENT PROBLEMS:   1. Sprain of right knee, initial encounter  Referral to Physical Therapy    Follow Up In Physical Therapy      2. Maltracking of right patella  Referral to Physical Therapy    Follow Up In Physical Therapy      3. Patellar bursitis of right knee  Referral to Physical Therapy    Follow Up In Physical Therapy      4. Patellar tendinitis of right knee  Referral to Physical Therapy    Follow Up In Physical Therapy      5. Right knee pain, unspecified chronicity  Referral to Physical Therapy    Follow Up In Physical Therapy      6. Primary osteoarthritis of right knee  Referral to Physical Therapy      7. Quadriceps strain, right, initial encounter  Referral to Physical Therapy    Follow Up In Physical Therapy      8. Strain of calf muscle, right, initial encounter  Referral to Physical Therapy    Follow Up In Physical Therapy          PRECAUTIONS:  Medical:   hernias, kidney cyst  Fall risk: low         PMH: (pertinent to PT evaluation)  Hematuria, leukocytosis, anxiety, DJD,  R shoulder impingement, R glenoid labrum tear, maltracking R patella, R patellar bursitis, r patellar tendonitis, rotator cuff tear, L foot pain, OA of spine with radiculopathy, SOB , smoker  *See Epic EMR for complete list.    Medical History Form: Reviewed (scanned into chart)      HPI  per doctor note:  (6-10-25)  \" Vincent P Lanese \"Bill\" is a 64 y.o. male " "presenting with RIGHT knee pain. Pt states that he has had an increase in his knee pain again over the past week starting at last Wednesday. It started as a random \"twinge\" after he was doing rehabilitation from his LEFT foot rehab from his surgery with Dr. Bardales. Pain with all range of motion. Notes that pain is all the same from his last visit in office two years ago. Notes that he has a twelve hour car ride coming up as well as being at a festival over multiple days for fourteen hour days. Presents wearing REACTION knee brace as previously given, however notes that it is bulky cumbersome and irritating. Denies any numbness or tingling. Rates current pain as a 7/10 and states that at worst a 10+/10. Hoping that he can get either injections and/or a prescription due to GI issues with NSAIDs.  We discussed treatment options.  Upon exam patient has indications of a flare of his degenerative joint disease as well as his patellar tendinitis patellar maltracking.  After discussion we will have patient start into physical therapy as soon as possible and we will treat with anti-inflammatory therapy including oral prednisone as well as a cortisone injection.  Patient can follow-up in 6 to 8 weeks for reevaluation for reevaluation and we can consider further treatment options at that time.           \"    SPECIAL CONCERNS:   Has access to a pool    SUBJECTIVE: The pt hasn't been back to the gym since foot surgery on February.    He did not do a lot voer the weekend.  He did do some yard work.  Played golf.      He is off a med and he is having less bloating and feeling much better.      He notices that his muscle tone is improving.       Per evaluation:  He has had along history for L foot issues and favors the L which irritates the R knee.   He had he had 2 Cortizone shots. He is tapered form the prednisone pack.  He had 10/10 pain at the time of the shot.  He did a lot of walking including on hills .  He does not have " confidence in the knee and has to plan out  his movement.  He feels his R leg is very weak and tight    PAIN:    Current  2 /10  RANGE: 0 /10  to  4-5  /10  Location: R knee        HOME LIVING:  The pt lives in a 1 story home with small  steps to enter.    PRIOR LEVEL OF FUNCTION  Uses a cane in the comminty if a long distance or crowded,      LEARNING PREFERENCE:  Pt agreed to varied teaching methods including verbal, written (for HEP), demonstration, and monitored practice.      OBJECTIVE:  GAIT  Distance: 30 ft observed  Assistive device:  none  Assistance needed: none  Deviations:  +grossly symmetrical  +Guarded  +NBOS    TREATMENTS:  Hamstring stretch  3x30 seconds    Standing:  with UE support for strengthening:  Hip flexion/marching   1 x10 with 2 #  Hip abduction 1 x10 with 2 #  Hamstring curl 1 x10 with 2 #  Small squat 2 x10   Toe raise1 x10  Slant board stretch  3x30 seconds    Bike x 5 min  Leg ext # 10 wt 2 x 10  Hamstring curl # 20 wt 2 x 10    Total gym level 20  for 2 x 10        EDUCATION:  Dicussed progress and goals to return to the gym      Pt educated in:  current status, general goals, treatment plan  Edema control   safe use cold packs    ASSESSMENT:  The pt is progressing well.  He pt was able to progress exercises .  He feels good when in the abdomin when core is engaged with exercise.    Able to perform a full squat with knee compression sleeve  No c/o discomfort while performing ex    Rehab potential:  excellent    GOALS:  Pt stated goal:  Knee improvement and leg strengthen in both legs     Long term goals:  + Increase knee ROM to 0-120 degrees as needed for stairs and transfers  + Increase B LE strength to 5/5  as needed for stairs, community mobility and house cleaning  + Reduce edema for improved mobility and reduced pain  + Ambulate community distances without deviation on level and advanced surfaces for full community reintegration  + Able to ascend and descend 8 inch steps with good  eccentric control   + Improve LE flexibility for reduced stress on joints and spine  + good balance on compliant surfaces  + Independent in a HEP for self-management of any further limitations from prior level of function.  + Decrease pain to <3/10  + Able to perform sit to stand with no increased pain.   + improved functional strength with 30 second  sit to stand = 15 reps  Pt to report greater confidence in his knee without knee brace      PLAN     Skilled physical therapy 2 times per week for 4-6 weeks  Treatment may include:  Therapeutic Exercise (36092)  Therapeutic Activity (81563)  Manual therapy (04601)  Neuro-muscular re-education (45157)  Gait Training (49839)  Ultrasound (78350)  Unattended Electrical Stimulation (/99549)  Attended Electrical Stimulation (65013)  Light therapy (10020)      The pt agreed with above stated treatment plan and general goals.    Some of This note was created using voice recognition software and was not corrected for typographical or grammatical errors.

## 2025-07-08 ENCOUNTER — HOSPITAL ENCOUNTER (OUTPATIENT)
Dept: RADIOLOGY | Facility: HOSPITAL | Age: 65
Discharge: HOME | End: 2025-07-08
Payer: MEDICARE

## 2025-07-08 DIAGNOSIS — R10.11 RIGHT UPPER QUADRANT PAIN: ICD-10-CM

## 2025-07-08 PROCEDURE — 76705 ECHO EXAM OF ABDOMEN: CPT | Performed by: RADIOLOGY

## 2025-07-08 PROCEDURE — 76705 ECHO EXAM OF ABDOMEN: CPT

## 2025-07-10 ENCOUNTER — APPOINTMENT (OUTPATIENT)
Dept: PHYSICAL THERAPY | Facility: CLINIC | Age: 65
End: 2025-07-10
Payer: MEDICARE

## 2025-07-13 ASSESSMENT — ENCOUNTER SYMPTOMS
MYALGIAS: 1
ARTHRALGIAS: 1
RESPIRATORY NEGATIVE: 1
CONSTITUTIONAL NEGATIVE: 1
CARDIOVASCULAR NEGATIVE: 1

## 2025-07-13 NOTE — PROGRESS NOTES
"Established patient  History Of Present Illness  Vincent P Lanese \"Bill\" is a 64 y.o. male presenting for follow up evaluation of his RIGHT knee. Since last visit in office, patient states that he feels much better and greatly improved. Every once in a while, patient notes some\"heaviness\" in his RIGHT knee. No issues going up or down stairs. He has resumed all of his normal activities. Denies any pain current in his RIGHT knee. While participating in activities, he does wear his knee brace. Heat and Ice provide relief. Patient also stating that his has also been using topicals, that he also finds helpful. Patient states that he has recently lost approximately 16 lbs due to increasing activity level.  We discussed treatment options.  Upon exam patient is largely back to normal with no significant pain or restriction in range of motion although does continue to have some mild weakness.  As such after discussion patient will continue with current course of treatment of physical therapy and can follow-up as needed for continued or further complaints of pain or disability.  Patient verbalizes understanding agreement with plan of care.    Past Medical History  He has a past medical history of Anxiety, Class 1 obesity with body mass index (BMI) of 31.0 to 31.9 in adult (09/13/2024), Hyperlipidemia, Insomnia, Melena, and Tick bite of left thigh, sequela (09/04/2024).    Surgical History  He has a past surgical history that includes Colonoscopy (10/26/2021); Colonoscopy (01/01/2014); and Colonoscopy (12/2024).     Social History  He reports that he has been smoking cigarettes. He has a 10 pack-year smoking history. He has been exposed to tobacco smoke. He has never used smokeless tobacco. He reports that he does not currently use alcohol. He reports that he does not use drugs.    Family History  Family History[1]     Allergies  Ketorolac    Review of Systems  Review of Systems   Constitutional: Negative.    Respiratory: " "Negative.     Cardiovascular: Negative.    Musculoskeletal:  Positive for arthralgias and myalgias.   All other systems reviewed and are negative.     Last Recorded Vitals  Ht 1.676 m (5' 6\")   Wt 82.6 kg (182 lb)   BMI 29.38 kg/m²      The , [place] BRAVO was present during today's visit and not limited to physical examination!    Examination:  Right Knee  Edema: Negative  Effusion: Negative.   Percussion Test:  Negative.   Tuning Fork Test:  Negative.   Ecchymosis/Bruising: Negative.            Palpation:   Negative    Orientation:  Negative           Range of Motion:   Knee Flexion ( 0-135 degrees)    Knee Extension ( 0-15 degrees)         Muscle Strength: Positive   +4/+5 Quadricep Extension   +5/+5  Hamstring Flexion           +5+5 Gastrocnemius  +5+5 Soleus.            Proprioception:        Pain with Squat:  Negative  Pain with Sumo Squat:  Negative  Hop Test:  Negative  Single leg balance test Negative           Vascular:   +2/+4 Femoral  +2/+4 Dorsalis Pedis  +2/+4 Posterior Tibial  Capillary Refill less than 2 seconds.     Knee - ACL:  Anterior Drawer Test: Negative  Lachman Test: Negative  Lelli Test:  Negative      KNEE - MCL / LCL:  Valgus Stress Test:  90 degrees: Negative, 20-30 degrees: Negative.   Varus Stress Test:  90 degrees: Negative 20-30 degrees: Negative  Apley Distraction Test: Negative  Thessaly Test: Negative      KNEE - IT BAND:  Forrest Test: Negative.   Noble Test: Negative.         KNEE - MENISCUS:  Apley Compression Test: Negative   Stienmann Test:  Negative  Joy Test: Negative  Bounce Home Test:  Negative  Thessaly Test:  Negative           KNEE - PATELLA:  Apprehension Test:  Negative  Glide Test:  Negative  Grind Test:  Negative  Patella Tracking Test:  Negative    KNEE - PCL/POSTERIOR LATERAL CORNER:  Posterior Drawer Test: Negative  Dial Test: Negative  Reverse Lachman Test: Negative     KNEE - POPLITEUS:  Bereket's Test: Negative              KNEE - QUADRICEPS:  "   VMO Test:  Negative  VLO Test:  Negative.         Hip/Pelvis - Sacrum:  Leg Length Supine: Negative  Leg Length Supine to Seated (Derbolowsky Sign): Negative  Standing Flexion Test: Negative  Seated Flexion Test: Negative  Spring Test: Negative  Sacral Somatic Dysfunction: Negative  Hip Flexor Tightness:  Negative  Hamstring Tightness:  Negative        Feet/Foot: Positive BILATERAL Valgus foot     Imaging and Diagnostics Review:  No new x-rays ordered at the time of visit    Assessment   1. Sprain of right knee, subsequent encounter    2. Maltracking of right patella    3. Patellar bursitis of right knee    4. Patellar tendinitis of right knee    5. Right knee pain, unspecified chronicity    6. Primary osteoarthritis of right knee    7. Quadriceps strain, right, subsequent encounter    8. Strain of calf muscle, right, subsequent encounter          Plan   Treatment or Intervention:  May use PRICE therapy as needed.  Start into Physical Therapy 1-2 times a week for 8-10 weeks with manual therapy as well as dry needling and IASTM  Again stressed the importance of wearing shoes with good stability control to help with the biomechanics affecting the lower legs  Again stressed the importance of wearing full foot insoles to help with the biomechanics affecting the lower legs  Recommendation over-the-counter calcium 500mg, 3 times a day with vitamin-D3 4751-5980+ units a day with food as well as a daily multivitamin  Recommendation over-the-counter Move Free for joint health  May take OTC Tylenol Extra Strength or OTC Tylenol Arthritis, taking one every 6-8 hours with food as needed for pain management.  Patient advised regarding the risk and/or potential adverse reactions and/or side effects of any prescribed medications along with any over-the-counter medications or any supplements used. Patient advised to seek immediate medical care if any adverse reactions occur. The patient and/or patient(s) parent(s) verbalized  their understanding.  Discussed in detail with the patient to the level of their understanding the possibility in the future of regenerative injections versus corticosteroids injections  Possibility in future of MRI of RIGHT KNEE to rule out tendon vs ligament vs tear vs fracture vs other, MSK to read.     Diagnostic studies:  NEW XRAYs completed during today's visit however not read by radiology by end of visit  ----------------------------  PROCEDURE:  KNEE RT MIN 4 VIEW - TXR  0093  REASON FOR EXAM: ACUTE PAIN OF RIGHT KNEE     ORDERING CLINICIAN: ANIL PRICE     TECHNIQUE:  Four views of the right knee were performed.     FINDINGS:  No fracture or dislocation is seen. Mild degenerative narrowing of the  patellofemoral compartment. There is trace, if any, suprapatellar knee joint  effusion. No radiopaque foreign body identified.     IMPRESSION:  Mild degenerative change of the right knee with trace, if any, suprapatellar  knee joint effusion.     If symptoms persist, consider MRI for further evaluation.  Dictation workstation:   [id number]     Original Interpreting Physician:   JUAN GUERIN MD  Original Transcribed by/Date: MMODAL Feb 6 2023 10:03A  Original Electronically Signed by/Date: JUAN GUERIN MD Feb 6 2023 10:23A    Activity Instructions, Restrictions, and Accommodations:      Consultations/Referrals:  Physical therapy    Follow-up:  Follow up as needed  Total appointment time _30_ minutes. Greater than 50% spent counseling patient on results of physical exam, treatment options as well as need for PT and expected outcomes, as well as discussing possible medications.      Anil Price CNP             [1] No family history on file.

## 2025-07-13 NOTE — PATIENT INSTRUCTIONS
May use PRICE therapy as needed.  Start into Physical Therapy 1-2 times a week for 8-10 weeks with manual therapy as well as dry needling and IASTM  Again stressed the importance of wearing shoes with good stability control to help with the biomechanics affecting the lower legs  Again stressed the importance of wearing full foot insoles to help with the biomechanics affecting the lower legs  Recommendation over-the-counter calcium 500mg, 3 times a day with vitamin-D3 5314-4933+ units a day with food as well as a daily multivitamin  Recommendation over-the-counter Move Free for joint health  May take OTC Tylenol Extra Strength or OTC Tylenol Arthritis, taking one every 6-8 hours with food as needed for pain management.  Patient advised regarding the risk and/or potential adverse reactions and/or side effects of any prescribed medications along with any over-the-counter medications or any supplements used. Patient advised to seek immediate medical care if any adverse reactions occur. The patient and/or patient(s) parent(s) verbalized their understanding.  Discussed in detail with the patient to the level of their understanding the possibility in the future of regenerative injections versus corticosteroids injections  Possibility in future of MRI of RIGHT KNEE to rule out tendon vs ligament vs tear vs fracture vs other, MSK to read.   Follow up

## 2025-07-16 ENCOUNTER — TREATMENT (OUTPATIENT)
Dept: PHYSICAL THERAPY | Facility: CLINIC | Age: 65
End: 2025-07-16
Payer: MEDICARE

## 2025-07-16 DIAGNOSIS — S86.811A STRAIN OF CALF MUSCLE, RIGHT, INITIAL ENCOUNTER: ICD-10-CM

## 2025-07-16 DIAGNOSIS — M70.51 PATELLAR BURSITIS OF RIGHT KNEE: ICD-10-CM

## 2025-07-16 DIAGNOSIS — M25.561 RIGHT KNEE PAIN, UNSPECIFIED CHRONICITY: ICD-10-CM

## 2025-07-16 DIAGNOSIS — S76.111A QUADRICEPS STRAIN, RIGHT, INITIAL ENCOUNTER: ICD-10-CM

## 2025-07-16 DIAGNOSIS — M22.8X1 MALTRACKING OF RIGHT PATELLA: ICD-10-CM

## 2025-07-16 DIAGNOSIS — S83.91XA SPRAIN OF RIGHT KNEE, INITIAL ENCOUNTER: ICD-10-CM

## 2025-07-16 DIAGNOSIS — M76.51 PATELLAR TENDINITIS OF RIGHT KNEE: ICD-10-CM

## 2025-07-16 PROCEDURE — 97110 THERAPEUTIC EXERCISES: CPT | Mod: GP,CQ

## 2025-07-16 NOTE — PROGRESS NOTES
"Physical Therapy  Treatment    Patient Name: Vincent P Lanese \"Bill\"  MRN: [id number]  Date: 7/16/2025  Time Calculation  Start Time: 1010  Stop Time: 1050  Time Calculation (min): 40 min       PT Therapeutic Procedures Time Entry  Therapeutic Exercise Time Entry: 40    INSURANCE:  Visit Number: 5  Insurance Type: Payor: MEDICAL [employer] [facility] MEDICARE / Plan: [employer] [facility] MEDICARE / Product Type: *No Product type* /   Authorization info:  NO AUTH / $30 copay / $3300 OOP not met / [place] visits / MMO [employer] ADV    CMS Re-Certification Period:6/24/25  to 9/22/25    CURRENT PROBLEMS:   1. Sprain of right knee, initial encounter  Referral to Physical Therapy    Follow Up In Physical Therapy      2. Maltracking of right patella  Referral to Physical Therapy    Follow Up In Physical Therapy      3. Patellar bursitis of right knee  Referral to Physical Therapy    Follow Up In Physical Therapy      4. Patellar tendinitis of right knee  Referral to Physical Therapy    Follow Up In Physical Therapy      5. Right knee pain, unspecified chronicity  Referral to Physical Therapy    Follow Up In Physical Therapy      6. Primary osteoarthritis of right knee  Referral to Physical Therapy      7. Quadriceps strain, right, initial encounter  Referral to Physical Therapy    Follow Up In Physical Therapy      8. Strain of calf muscle, right, initial encounter  Referral to Physical Therapy    Follow Up In Physical Therapy          PRECAUTIONS:  Medical:   hernias, kidney cyst  Fall risk: low         PMH: (pertinent to PT evaluation)  Hematuria, leukocytosis, anxiety, DJD,  R shoulder impingement, R glenoid labrum tear, maltracking R patella, R patellar bursitis, r patellar tendonitis, rotator cuff tear, L foot pain, OA of spine with radiculopathy, SOB , smoker  *See Epic EMR for complete list.    Medical History Form: Reviewed (scanned into chart)      HPI  per doctor note:  (6-10-25)  \" Vincent P Lanese \"Bill\" is a 64 y.o. male " "presenting with RIGHT knee pain. Pt states that he has had an increase in his knee pain again over the past week starting at last Wednesday. It started as a random \"twinge\" after he was doing rehabilitation from his LEFT foot rehab from his surgery with Dr. Bardales. Pain with all range of motion. Notes that pain is all the same from his last visit in office two years ago. Notes that he has a twelve hour car ride coming up as well as being at a festival over multiple days for fourteen hour days. Presents wearing REACTION knee brace as previously given, however notes that it is bulky cumbersome and irritating. Denies any numbness or tingling. Rates current pain as a 7/10 and states that at worst a 10+/10. Hoping that he can get either injections and/or a prescription due to GI issues with NSAIDs.  We discussed treatment options.  Upon exam patient has indications of a flare of his degenerative joint disease as well as his patellar tendinitis patellar maltracking.  After discussion we will have patient start into physical therapy as soon as possible and we will treat with anti-inflammatory therapy including oral prednisone as well as a cortisone injection.  Patient can follow-up in 6 to 8 weeks for reevaluation for reevaluation and we can consider further treatment options at that time.           \"    SPECIAL CONCERNS:   Has access to a pool    SUBJECTIVE: Feeling good , no c/o discomfort          The pt hasn't been back to the gym since foot surgery on February.    He did not do a lot voer the weekend.  He did do some yard work.  Played golf.      He is off a med and he is having less bloating and feeling much better.      He notices that his muscle tone is improving.       Per evaluation:  He has had along history for L foot issues and favors the L which irritates the R knee.   He had he had 2 Cortizone shots. He is tapered form the prednisone pack.  He had 10/10 pain at the time of the shot.  He did a lot of walking " including on hills .  He does not have confidence in the knee and has to plan out  his movement.  He feels his R leg is very weak and tight    PAIN:    Current  2 /10  RANGE: 0 /10  to  4-5  /10  Location: R knee        HOME LIVING:  The pt lives in a 1 story home with small  steps to enter.    PRIOR LEVEL OF FUNCTION  Uses a cane in the comminty if a long distance or crowded,      LEARNING PREFERENCE:  Pt agreed to varied teaching methods including verbal, written (for HEP), demonstration, and monitored practice.      OBJECTIVE:  GAIT  Distance: 30 ft observed  Assistive device:  none  Assistance needed: none  Deviations:  +grossly symmetrical  +Guarded  +NBOS    TREATMENTS:  Hamstring stretch  3x30 seconds    Standing:  with UE support for strengthening: on blue foam pad  Hip flexion/marching   1 x10 with 2 #  Hip abduction 1 x10 with 2 #  Hamstring curl 1 x10 with 2 #  Small squat 2 x10   Toe raise1 x10  Slant board stretch  3x30 seconds    Bike x 6 min  Leg ext # 10 wt 2 x 10  Hamstring curl # 20 wt 2 x 10    Total gym level 20  for 2 x 10        EDUCATION:  Dicussed progress and goals to return to the gym      Pt educated in:  current status, general goals, treatment plan  Edema control   safe use cold packs    ASSESSMENT:  The pt is progressing well.  He pt was able to progress exercises .  He feels good when in the abdomin when core is engaged with exercise.    Able to perform a full squat with knee compression sleeve  No c/o discomfort while performing ex, unchanged this session    Rehab potential:  excellent    GOALS:  Pt stated goal:  Knee improvement and leg strengthen in both legs     Long term goals:  + Increase knee ROM to 0-120 degrees as needed for stairs and transfers  + Increase B LE strength to 5/5  as needed for stairs, community mobility and house cleaning  + Reduce edema for improved mobility and reduced pain  + Ambulate community distances without deviation on level and advanced surfaces for  full community reintegration  + Able to ascend and descend 8 inch steps with good eccentric control   + Improve LE flexibility for reduced stress on joints and spine  + good balance on compliant surfaces  + Independent in a HEP for self-management of any further limitations from prior level of function.  + Decrease pain to <3/10  + Able to perform sit to stand with no increased pain.   + improved functional strength with 30 second  sit to stand = 15 reps  Pt to report greater confidence in his knee without knee brace      PLAN     Skilled physical therapy 2 times per week for 4-6 weeks  Treatment may include:  Therapeutic Exercise (14258)  Therapeutic Activity (86495)  Manual therapy (12834)  Neuro-muscular re-education (26691)  Gait Training (38184)  Ultrasound (14519)  Unattended Electrical Stimulation (/59206)  Attended Electrical Stimulation (48963)  Light therapy (43091)      The pt agreed with above stated treatment plan and general goals.    Some of This note was created using voice recognition software and was not corrected for typographical or grammatical errors.

## 2025-07-22 ENCOUNTER — OFFICE VISIT (OUTPATIENT)
Dept: ORTHOPEDIC SURGERY | Facility: CLINIC | Age: 65
End: 2025-07-22
Payer: MEDICARE

## 2025-07-22 VITALS — BODY MASS INDEX: 29.25 KG/M2 | WEIGHT: 182 LBS | HEIGHT: 66 IN

## 2025-07-22 DIAGNOSIS — M76.51 PATELLAR TENDINITIS OF RIGHT KNEE: ICD-10-CM

## 2025-07-22 DIAGNOSIS — S86.811D STRAIN OF CALF MUSCLE, RIGHT, SUBSEQUENT ENCOUNTER: ICD-10-CM

## 2025-07-22 DIAGNOSIS — S76.111D QUADRICEPS STRAIN, RIGHT, SUBSEQUENT ENCOUNTER: ICD-10-CM

## 2025-07-22 DIAGNOSIS — M17.11 PRIMARY OSTEOARTHRITIS OF RIGHT KNEE: ICD-10-CM

## 2025-07-22 DIAGNOSIS — S83.91XD SPRAIN OF RIGHT KNEE, SUBSEQUENT ENCOUNTER: ICD-10-CM

## 2025-07-22 DIAGNOSIS — M70.51 PATELLAR BURSITIS OF RIGHT KNEE: ICD-10-CM

## 2025-07-22 DIAGNOSIS — M22.8X1 MALTRACKING OF RIGHT PATELLA: ICD-10-CM

## 2025-07-22 DIAGNOSIS — M25.561 RIGHT KNEE PAIN, UNSPECIFIED CHRONICITY: ICD-10-CM

## 2025-07-22 PROCEDURE — 99214 OFFICE O/P EST MOD 30 MIN: CPT | Performed by: NURSE PRACTITIONER

## 2025-07-22 PROCEDURE — 99212 OFFICE O/P EST SF 10 MIN: CPT | Performed by: NURSE PRACTITIONER

## 2025-07-22 PROCEDURE — 3008F BODY MASS INDEX DOCD: CPT | Performed by: NURSE PRACTITIONER

## 2025-07-22 ASSESSMENT — ENCOUNTER SYMPTOMS
OCCASIONAL FEELINGS OF UNSTEADINESS: 0
LOSS OF SENSATION IN FEET: 0
DEPRESSION: 0

## 2025-07-22 ASSESSMENT — PAIN SCALES - GENERAL: PAINLEVEL_OUTOF10: 0-NO PAIN

## 2025-07-23 ENCOUNTER — TREATMENT (OUTPATIENT)
Dept: PHYSICAL THERAPY | Facility: CLINIC | Age: 65
End: 2025-07-23
Payer: MEDICARE

## 2025-07-23 DIAGNOSIS — M25.561 RIGHT KNEE PAIN, UNSPECIFIED CHRONICITY: ICD-10-CM

## 2025-07-23 DIAGNOSIS — M76.51 PATELLAR TENDINITIS OF RIGHT KNEE: ICD-10-CM

## 2025-07-23 DIAGNOSIS — S76.111A QUADRICEPS STRAIN, RIGHT, INITIAL ENCOUNTER: ICD-10-CM

## 2025-07-23 DIAGNOSIS — S83.91XA SPRAIN OF RIGHT KNEE, INITIAL ENCOUNTER: ICD-10-CM

## 2025-07-23 DIAGNOSIS — M70.51 PATELLAR BURSITIS OF RIGHT KNEE: ICD-10-CM

## 2025-07-23 DIAGNOSIS — M22.8X1 MALTRACKING OF RIGHT PATELLA: ICD-10-CM

## 2025-07-23 DIAGNOSIS — S86.811A STRAIN OF CALF MUSCLE, RIGHT, INITIAL ENCOUNTER: ICD-10-CM

## 2025-07-23 PROCEDURE — 97110 THERAPEUTIC EXERCISES: CPT | Mod: GP | Performed by: PHYSICAL THERAPIST

## 2025-07-23 NOTE — PROGRESS NOTES
"Physical Therapy  Treatment, reassessment, and discharge    Patient Name: Vincent P Lanese \"[place]"  MRN: [id number]  Date: 7/23/2025  Time Calculation  Start Time: 0920  Stop Time: 1000  Time Calculation (min): 40 min       PT Therapeutic Procedures Time Entry  Therapeutic Exercise Time Entry: 40    INSURANCE:  Visit Number: 6  Insurance Type: Payor: [facility] MEDICARE / Plan: [facility] MEDICARE / Product Type: *No Product type* /   Authorization info:  NO AUTH / $30 copay / $3300 OOP not met / [place] visits / [facility]MO MCARE ADV    CMS Re-Certification Period:6/24/25  to 9/22/25    CURRENT PROBLEMS:   1. Sprain of right knee, initial encounter  Referral to Physical Therapy    Follow Up In Physical Therapy      2. Maltracking of right patella  Referral to Physical Therapy    Follow Up In Physical Therapy      3. Patellar bursitis of right knee  Referral to Physical Therapy    Follow Up In Physical Therapy      4. Patellar tendinitis of right knee  Referral to Physical Therapy    Follow Up In Physical Therapy      5. Right knee pain, unspecified chronicity  Referral to Physical Therapy    Follow Up In Physical Therapy      6. Primary osteoarthritis of right knee  Referral to Physical Therapy      7. Quadriceps strain, right, initial encounter  Referral to Physical Therapy    Follow Up In Physical Therapy      8. Strain of calf muscle, right, initial encounter  Referral to Physical Therapy    Follow Up In Physical Therapy          PRECAUTIONS:  Medical:   hernias, kidney cyst  Fall risk: low         PMH: (pertinent to PT evaluation)  Hematuria, leukocytosis, anxiety, DJD,  R shoulder impingement, R glenoid labrum tear, maltracking R patella, R patellar bursitis, r patellar tendonitis, rotator cuff tear, L foot pain, OA of spine with radiculopathy, SOB , smoker  *See Epic EMR for complete list.    Medical History Form: Reviewed (scanned into chart)      HPI  per doctor note:  (6-10-25)  \" Vincent P Lanese " "\"Bill\" is a 64 y.o. male presenting with RIGHT knee pain. Pt states that he has had an increase in his knee pain again over the past week starting at last Wednesday. It started as a random \"twinge\" after he was doing rehabilitation from his LEFT foot rehab from his surgery with Dr. Bardales. Pain with all range of motion. Notes that pain is all the same from his last visit in office two years ago. Notes that he has a twelve hour car ride coming up as well as being at a festival over multiple days for fourteen hour days. Presents wearing REACTION knee brace as previously given, however notes that it is bulky cumbersome and irritating. Denies any numbness or tingling. Rates current pain as a 7/10 and states that at worst a 10+/10. Hoping that he can get either injections and/or a prescription due to GI issues with NSAIDs.  We discussed treatment options.  Upon exam patient has indications of a flare of his degenerative joint disease as well as his patellar tendinitis patellar maltracking.  After discussion we will have patient start into physical therapy as soon as possible and we will treat with anti-inflammatory therapy including oral prednisone as well as a cortisone injection.  Patient can follow-up in 6 to 8 weeks for reevaluation for reevaluation and we can consider further treatment options at that time.           \"    SPECIAL CONCERNS:   Has access to a pool    SUBJECTIVE:   The pt saw his doctor and he is now cleared with his knee.   He has mild L hamstring weakness. He had Cortizone shots that may be an ongoing need.  He is able to get in a deep squat.  He can walk on the rocks  and  jog    He has injured his elbow and may need PT for this in the future.  He is dropping things.    Per evaluation:  He has had along history for L foot issues and favors the L which irritates the R knee.   He had he had 2 Cortizone shots. He is tapered form the prednisone pack.  He had 10/10 pain at the time of the shot.  He did " a lot of walking including on hills .  He does not have confidence in the knee and has to plan out  his movement.  He feels his R leg is very weak and tight    PAIN:    Current  1 /10  RANGE: 0 /10  to  2 /10  Location: R knee  Description: heavy  Aggravating: warm weather  Reducing: brace, ice, elevation, heat, compression and stretching.   - PRN ( not in the last week        HOME LIVING:  The pt lives in a 1 story home with small  steps to enter.    PRIOR LEVEL OF FUNCTION  Uses a cane in the comminty if a long distance or crowded,      LEARNING PREFERENCE:  Pt agreed to varied teaching methods including verbal, written (for HEP), demonstration, and monitored practice.      OBJECTIVE:  Lower Extremity                       ROM (in degrees)                            Right        Left   Knee flex              135      WFL   Knee ext                0          WFL   Dorsiflexion             20            17                                MMT:    (out of 5)                            Right        Left   Hip flexion           5           5   Hip extension         5       5   Hip abduction              5     5   Hip IR            5            5   Hip ER              5           5   Knee flex              5             5   Knee ext              5             5   Dorsiflexion             5              5   Plantarflexion                5             5      FLEXIBILTY:                             R                                 L  Hamstrings (at 90/90)              -10   degrees             - 20    degrees       EDEMA:                        R                         L  Infrapatellar                 35.3    cm         35.8   cm        PALPATION  No Tenderness noted around the knee cap without mild effusion       BED MOBILITY:  Sit to supine:   independent  Supine to sit:  independent     TRANSFERS:  Sit to stand:   independent  Stand to sit:   independent  30 seconds sit to stand test: 29 (increased by 20)      GAIT  Distance: 100 ft observed  Assistive device:  none  Assistance needed: none  Deviations:  +symmetrical        TREATMENTS:  ADVANCED GAIT with 0 UE support   Marching 30 feet x1  Squat-side step L and R 30 feet each   Retro walk 30 feet   Lunge walk 30 feet    Air ex foam with 0 UE support  and feet apart  +  eyes closed   + head nods with eyes closed  + head turns with eyes closed  + Arm raise with eyes open and closed  + Arm reach with eyes open and closed    EDUCATION:  Dicussed progress and goals   Discussed tennis elbow splint  Discussed purchase of balance foam at patient request    Pt educated in:  current status, general goals, treatment plan  Edema control   safe use cold packs    ASSESSMENT:  The pt has made excellent progress in physical therapy and met all goals set on evaluation.  He was mildly challenged with activities on compliant surface and is interested in continuing this at home.  Information given for purchase of balance foam.  At this time the patient no longer requires skilled physical therapy for his knee.  And is appropriate for discharge  Rehab potential:  excellent    GOALS:  Pt stated goal:  Knee improvement and leg strengthen in both legs     Long term goals:  + Increase knee ROM to 0-120 degrees as needed for stairs and transfers (MET)  + Increase B LE strength to 5/5  as needed for stairs, community mobility and house cleaning (MET)  + Reduce edema for improved mobility and reduced pain (MET)  + Ambulate community distances without deviation on level and advanced surfaces for full community reintegration (MET)  + Able to ascend and descend 8 inch steps with good eccentric control  (MET)  + Improve LE flexibility for reduced stress on joints and spine (MET)  + good balance on compliant surfaces (MET)  + Independent in a [facility] for self-management of any further limitations from prior level of function. (MET)  + Decrease pain to <3/10 (MET)  + Able to perform sit to stand with no  increased pain.  (MET)  + improved functional strength with 30 second  sit to stand = 15 reps (MET)  Pt to report greater confidence in his knee without knee brace (MET)      PLAN  Discharge PT      The pt agreed with above stated treatment plan and general goals.    Some of This note was created using voice recognition software and was not corrected for typographical or grammatical errors.

## 2025-07-28 ASSESSMENT — ENCOUNTER SYMPTOMS
CONSTITUTIONAL NEGATIVE: 1
CARDIOVASCULAR NEGATIVE: 1
RESPIRATORY NEGATIVE: 1
MYALGIAS: 1
ARTHRALGIAS: 1

## 2025-07-28 NOTE — PATIENT INSTRUCTIONS
May use RICE therapy as needed  Start into hand/physical therapy 1-2 times a week for 8-10 weeks with manual therapy as well as dry needling and IASTM  Additionally reviewed modifying activities to be performed while exercising as well as activities with daily living  Discussed in detail with the patient to the level of their understanding the possibility in the future of regenerative injections versus corticosteroid injections  Recommendation over-the-counter calcium 500mg, 3 times a day with vitamin-D3 9440-3616+ units a day with food as well as a daily multivitamin  Recommendation over-the-counter Move Free for joint health  May take OTC Tylenol Extra Strength or OTC Tylenol Arthritis, taking one every 6-8 hours with food as needed for pain management.  Patient advised regarding the risks and/or potential adverse reactions and/or side effects of any prescribed medications along with any over-the-counter medications or any supplements used. Patient advised to seek immediate medical care if any adverse reactions occur. The patient and/or patient(s) parent(s) verbalized their understanding.  Possibility in future of MRI of RIGHT ELBOW to rule out tendon vs ligament vs tear vs fracture vs other, MSK to read.   Follow up 6 weeks

## 2025-07-28 NOTE — PROGRESS NOTES
"Established patient  History Of Present Illness  Vincent P Lanese \"Bill\" is a 64 y.o. male presenting with RIGHT ELBOW pain. Pt states that he has been having pain for two months. Presents wearing epicondylitis band. Notes that he was originally scheduled for evaluation earlier in the summer but his pain was not as bad. However, in the past week his pain has increased. Notes that there is a door in his house that he tends to always hit his elbow on. States that the Tylenol arthritis has not touched pain. Pain located on the lateral aspect primarily and goes in to the upper arm and forearm as well. Pain with all range of motion but not restricted. Notes that restriction and weight can increase pain some. Notes occasional numbness and tingling that is randomly. Rates current pain as a 5/10. Notes that he can't take NSAIDs due to his GI issues. States that he has tried heating and icing with minimal relief.  We discussed treatment options.  Upon exam patient has indications of a extensor as well as pronator strain as well as some triceps tendinitis.  Patient has been wearing his counterforce strap and states that this been helpful but has not started physical therapy as of yet.  As such after discussion we will have patient start into PT and he can follow-up in 4 to 6 weeks for reevaluation and we can consider further treatment options at that time such as MRI or CT is indicated.  Patient to continue wearing his counterforce strap and using over-the-counter medications for pain.  Patient verbalizes understanding and agreement with plan of care.    Past Medical History  He has a past medical history of Anxiety, Class 1 obesity with body mass index (BMI) of 31.0 to 31.9 in adult (09/13/2024), Hyperlipidemia, Insomnia, Melena, and Tick bite of left thigh, sequela (09/04/2024).    Surgical History  He has a past surgical history that includes Colonoscopy (10/26/2021); Colonoscopy (01/01/2014); and Colonoscopy (12/2024).   " "  Social History  He reports that he has been smoking cigarettes. He has a 10 pack-year smoking history. He has been exposed to tobacco smoke. He has never used smokeless tobacco. He reports that he does not currently use alcohol. He reports that he does not use drugs.    Family History  Family History[1]     Allergies  Ketorolac    Review of Systems  Review of Systems   Constitutional: Negative.    Respiratory: Negative.     Cardiovascular: Negative.    Musculoskeletal:  Positive for arthralgias and myalgias.   All other systems reviewed and are negative.    Last Recorded Vitals  /87 (BP Location: Right arm)   Ht 1.676 m (5' 6\")   Wt 82.6 kg (182 lb)   BMI 29.38 kg/m²      The , [place] was present during today's visit and not limited to physical examination!    Examination:  Right Elbow  Erythema: Negative.   Edema: Negative.   Effusion: Negative.   Warmth: Negative.   Ecchymosis/Bruising: Negative.   Percussion Test: Negative.   Tuning Fork Test: Negative.   Abrasions: Negative.     Orientation: Negative.            ROM:   Positive FROM but causes pain.     Muscle Strength: Positive   +4/+5 Triceps (Elbow Extension)          +5/+5 Biceps Brachii (Elbow Flexion)  +5/+5 Brachialis (Elbow Flexion)  +5/+5 Brachioradialis (Elbow Flexion)        +5/+5Coracobrachialis  +5/+5 Common Flexor Tendon   +4/+5 Common Extensor Tendon  +4/+5 Pronation  +5/+5 Supination.            DTR/Neurological:   Negative , Normal, Sensation Intact, 2-Point Discrimination: Negative  +2/+4 Biceps Reflex (C5)  +2/+4 Brachioradialis Reflex (C6)  +2/+4 Triceps Reflex (C7).            Sensation/Neurological:    Negative, Sensation Intact; 2-Point Discrimination Test: Negative  C5: Area of collarbones, lateral upper arms, and upper chest  C6: Lateral forearms, thumbs, anterior index finger, and lateral half of the middle finger  C7: Some of posterior index finger, medial half of middle finger, upper posterior back, and " back of arms  C8: Ring finger, little finger, medial forearm, and posterior upper back  T1: Medial anterior upper arm, axilla, upper chest, and posterior back.            Palpation: Positive Tenderness to Palpation over the Right Elbow lateral and medial Epicondyle           Vascular:   +2/+4 Carotid Pulse   +2/+4 Brachial Artery   +2/+4 Radial Pulse   +2/+4 Ulnar Pulse,   Capillary Refill< 2 - seconds.            Elbow - Compression Syndrome:  Elbow Flexion Test: Negative.   Tinel Test Ulnar Side: Negative.   Tinel Test Radial Side: Negative.   Pronator Compression Test: Negative.   Supinator Compression Test: Negative.            Elbow - Instability:  Valgus Stess Test: Negative.   Varus Stress Test: Negative.   Posterolateral Rotary Instability Test: Negative.   Ulnar Nerve Instability Test: Negative.   Milking Maneuver Test: Negative.   Lateral Pivot Shift Test: Negative.         Elbow - Lateral Epicondylitis:  Tennis Elbow Test: Positive  Cozen Test: Positive  Chair Test: Positive   Holland Test: Positive  Mill Test: Negative.   Forearm/Wrist Extension Test: Positive  Maudsley's/Schulburg's (Middle Finger Extension) Test: Negative.         Elbow - Medial Epicondylitis:  Golfer's Elbow Sign: Positive   Reverse Cozen Test: Negative.   Reverse Chair Test: Negative.   Wrist Flexion Test: Negative.   4th and 5th Finger Flexion Test: Positive    Elbow - Orientation:  Hyperflexion Test: Negative.   Hyperextension Test: Negative.   Supination Stress Test: Negative.   Pronation Stress Test:  Positive       Imaging and Diagnostics Review:  Plain radiographs ordered and independently reviewed.    Assessment   1. Impingement of right ulnar nerve    2. Golfer's elbow, right    3. Right tennis elbow    4. Right elbow pain    5. Biceps strain, right, initial encounter    6. Pronator syndrome, right        Plan   Treatment or Intervention:  May use RICE therapy as needed  Start into hand/physical therapy 1-2 times a week for  8-10 weeks with manual therapy as well as dry needling and IASTM  Additionally reviewed modifying activities to be performed while exercising as well as activities with daily living  Discussed in detail with the patient to the level of their understanding the possibility in the future of regenerative injections versus corticosteroid injections  Recommendation over-the-counter calcium 500mg, 3 times a day with vitamin-D3 5932-5590+ units a day with food as well as a daily multivitamin  Recommendation over-the-counter Move Free for joint health  May take OTC Tylenol Extra Strength or OTC Tylenol Arthritis, taking one every 6-8 hours with food as needed for pain management.  Patient advised regarding the risks and/or potential adverse reactions and/or side effects of any prescribed medications along with any over-the-counter medications or any supplements used. Patient advised to seek immediate medical care if any adverse reactions occur. The patient and/or patient(s) parent(s) verbalized their understanding.  Possibility in future of MRI of RIGHT ELBOW to rule out tendon vs ligament vs tear vs fracture vs other, MSK to read.     Diagnostic studies:  XRAYs completed during today's visit however not read by radiology by end of    Activity Instructions, Restrictions, and Accommodations:      Consultations/Referrals:  Physical therapy, Occupational therapy    Follow-up:  Follow up 6 weeks  Total appointment time _30_ minutes. Greater than 50% spent counseling patient on results of physical exam, treatment options as well as need for PT and expected outcomes, as well as discussing possible medications.    Ronaldo Underwood CNP           [1] No family history on file.

## 2025-07-29 ENCOUNTER — OFFICE VISIT (OUTPATIENT)
Dept: ORTHOPEDIC SURGERY | Facility: CLINIC | Age: 65
End: 2025-07-29
Payer: MEDICARE

## 2025-07-29 VITALS
DIASTOLIC BLOOD PRESSURE: 87 MMHG | SYSTOLIC BLOOD PRESSURE: 129 MMHG | HEIGHT: 66 IN | BODY MASS INDEX: 29.25 KG/M2 | WEIGHT: 182 LBS

## 2025-07-29 DIAGNOSIS — M77.11 RIGHT TENNIS ELBOW: ICD-10-CM

## 2025-07-29 DIAGNOSIS — G56.21 IMPINGEMENT OF RIGHT ULNAR NERVE: Primary | ICD-10-CM

## 2025-07-29 DIAGNOSIS — M25.521 RIGHT ELBOW PAIN: ICD-10-CM

## 2025-07-29 DIAGNOSIS — M77.01 GOLFER'S ELBOW, RIGHT: ICD-10-CM

## 2025-07-29 DIAGNOSIS — S46.211A BICEPS STRAIN, RIGHT, INITIAL ENCOUNTER: ICD-10-CM

## 2025-07-29 DIAGNOSIS — G56.11 PRONATOR SYNDROME, RIGHT: ICD-10-CM

## 2025-07-29 PROCEDURE — 99214 OFFICE O/P EST MOD 30 MIN: CPT | Performed by: NURSE PRACTITIONER

## 2025-07-29 PROCEDURE — 3008F BODY MASS INDEX DOCD: CPT | Performed by: NURSE PRACTITIONER

## 2025-07-29 ASSESSMENT — ENCOUNTER SYMPTOMS
DEPRESSION: 0
LOSS OF SENSATION IN FEET: 0
OCCASIONAL FEELINGS OF UNSTEADINESS: 0

## 2025-07-29 ASSESSMENT — PAIN SCALES - GENERAL: PAINLEVEL_OUTOF10: 5

## 2025-07-30 ENCOUNTER — CLINICAL SUPPORT (OUTPATIENT)
Dept: GASTROENTEROLOGY | Facility: CLINIC | Age: 65
End: 2025-07-30
Payer: MEDICARE

## 2025-07-30 DIAGNOSIS — K76.0 FATTY INFILTRATION OF LIVER: ICD-10-CM

## 2025-07-30 PROCEDURE — 91200 LIVER ELASTOGRAPHY: CPT | Performed by: STUDENT IN AN ORGANIZED HEALTH CARE EDUCATION/TRAINING PROGRAM

## 2025-08-01 NOTE — PROGRESS NOTES
"Subjective   Patient ID: Vincent P Lanese \"[place]" is a 64 y.o. male who presents for consult for RUQ pain.  HPI  Patient is new to clinic and presents for consult for RUQ pain.  Patient is referred by Marietta Mondragon CNP.  Patient seen Marietta Mondragon CNP Gastro in the office on 6/26/2025 for follow up exam. Continues to intermittent RUQ pain, random. Will update RUQ US, referral to Dr. Garza.  Also complaining of bloating.  On Bentyl for this.  Patient's sister had a cholecystectomy.  Patient was all over the place with his symptoms.  I was able however to review the chart and find that patient did have an EGD in 2024 with a small hiatal hernia mild erythema of the stomach no evidence of ulcers.  Negative for H. pylori.  No significant findings on MRI of the liver.  Patient has CT abdomen pelvis w IV contrast completed on 12/7/2024. Impresson was No CT evidence for acute intra-abdominal or intrapelvic  abnormalities are identified. No CT findings to explain patient's  symptoms. Additional detailed findings as above.  Patient had US RUQ completed on 7/8/2025. Impression was 12 mm upper pole right renal cortical simple cyst. Otherwise, negative exam.  Patient had fibroscan completed on 7/31/2025. Findings were E/median liver stiffness: 3.8 kPa with an IQR of 16% . CAP (controlled attenuation parameter): 256 dB/m.   Patient's last colonoscopy was completed on 12/9/2024 by Dr. Doug Naranjo. Findings were one benign-appearing polyp measuring smaller than 5 mm in the descending colon and external and internal small hemorrhoids observed during retroflexion. Repeat colonoscopy was recommended in 7 years time.  Patient has significant medical history that we reviewed.  State issues kidney issues and abdominal pain issues.  FibroSCAN Result Interpretation   Hepatology Attending Note    Referring provider: Marietta Mondragon, APRN-C*   Reading physician: Chinyere Carrasquillo MD   : ANNETTE Auguste RN     Indication:    Encounter " Diagnosis   Name      Fatty infiltration of liver    MASLD/MASH     Pertinent patient details:   64 y.o.    BMI Readings from Last 1 Encounters:   07/29/25 29.38 kg/m²     Lab Results   Component Value Date     02/26/2025    ALT 17 02/26/2025       Study details:   Probe: Probe: M+   Number of Valid Measurements: 10   Scan technically adequate: adequate     Results:   E/median liver stiffness: 3.8 kPa with an IQR of 16%   CAP (controlled attenuation parameter): 256 dB/m.     US RIGHT UPPER QUADRANT  7/8/2025 9:40 am      INDICATION:  65 y/o   M with  Signs/Symptoms:R10.11.  ,R10.11 Right upper quadrant pain      COMPARISON:  Liver MRI dated 05/16/2025. CT scan dated 12/07/2024.      TECHNIQUE:  Routine ultrasound of the right upper quadrant was performed using  grayscale imaging, color Doppler, and spectral Doppler.      FINDINGS:  LIVER:  Craniocaudal length: 17.7 cm.  This is  within normal limits.  Echogenicity:  Normal.  Mass:  None      GALLBLADDER:  No shadowing stone, wall thickening, or adjacent edema  .  The gallbladder wall thickness is 0.3 cm. Sonographic Richey's sign  is negative.      BILE DUCTS:  No intrahepatic biliary ductal dilatation.  Common bile duct measured 0.5   in diameter. This is within the  limits of normal.      PANCREAS:  The body of the pancreas was intact.  The pancreatic head and tail  were obscured..      RIGHT KIDNEY:  Craniocaudal length:  10.7 cm , Within normal limits of size for age.  Echogenicity was normal.  No hydronephrosis, shadowing stone, or perinephric edema.  No gross right renal mass. Small upper pole cortical cyst measures 12  mm in maximal diameter.      PERITONEAL FLUID:  None seen in the right upper quadrant.          IMPRESSION:  12 mm upper pole right renal cortical simple cyst.      Otherwise, negative exam.      MACRO:  None    CT ABDOMEN PELVIS W IV CONTRAST;  12/7/2024 1:33 pm      INDICATION:  Signs/Symptoms:pain rectal bleeding.      ORDERING  CLINICIAN:  JUAN C MULTANI      TECHNIQUE:  CT of the abdomen and pelvis was performed.  Standard contiguous  axial images were obtained at 3 mm slice thickness through the  abdomen and pelvis. Coronal and sagittal reconstructions at 3 mm  slice thickness were performed.      75  ml of contrast Omnipaque 350 were administered intravenously  without immediate complication.      FINDINGS:  LOWER CHEST:  The visualized bases are clear bilaterally.      ABDOMEN:      LIVER:  Liver is normal in size. No focal lesions are seen.      BILE DUCTS:  Biliary system is nondilated.      GALLBLADDER:  Within normal limits.      PANCREAS:  No focal lesions. No peripancreatic fat stranding.      SPLEEN:  The spleen is normal in size. No focal abnormalities are seen.      ADRENAL GLANDS:  The adrenal glands bilaterally within normal limits.      KIDNEYS AND URETERS:  No hydronephrosis or renal masses. No perinephric stranding. No  radiodense renal calculi.      PELVIS:      BLADDER:  Within normal limits as distended. No bladder calculi or masses.      REPRODUCTIVE ORGANS:  No pelvic free fluid, masses or lymphadenopathy      BOWEL:  The small and large bowel are nondilated.  The visualized appendix is  normal.      VESSELS:  Aorta and IVC within normal limits as visualized in this exam. No  signs of aortic aneurysm.      PERITONEUM/RETROPERITONEUM/LYMPH NODES:  No retroperitoneal masses are seen.  No lymphadenopathy.      BONES AND ABDOMINAL WALL:  There is no evidence for destructive lytic or blastic bone lesions  identified.  No abdominal wall masses or hernias are identified.      IMPRESSION:  1.  No CT evidence for acute intra-abdominal or intrapelvic  abnormalities are identified. No CT findings to explain patient's  symptoms.  2. Additional detailed findings as above.    Social History:  Tobacco Use - Yes  Do you use any recreational drugs - No  Alcohol Use -No  Caffeine Intake - Yes   Working - Retired  Marital status -  "  Living with - Self     Past Medical History:   Diagnosis Date    Anxiety     Class 1 obesity with body mass index (BMI) of 31.0 to 31.9 in adult 09/13/2024    Hyperlipidemia     Insomnia     Melena     Tick bite of left thigh, sequela 09/04/2024     No family history on file.  Past Surgical History:   Procedure Laterality Date    COLONOSCOPY  10/26/2021    COLONOSCOPY  01/01/2014    COLONOSCOPY  12/2024     Allergies   Allergen Reactions    Ketorolac Other and GI Upset     GI Upset    GI Upset       GI Upset       Review of Systems  /87 (BP Location: Left arm, Patient Position: Sitting, BP Cuff Size: Adult)   Pulse 75   Temp 36.7 °C (98 °F) (Temporal)   Resp 17   Ht 1.676 m (5' 6\")   Wt 82.6 kg (182 lb)   SpO2 96%   BMI 29.38 kg/m²    Objective   Physical Exam  /87 (BP Location: Left arm, Patient Position: Sitting, BP Cuff Size: Adult)   Pulse 75   Temp 36.7 °C (98 °F) (Temporal)   Resp 17   Ht 1.676 m (5' 6\")   Wt 82.6 kg (182 lb)   SpO2 96%   BMI 29.38 kg/m²    GENERAL APPEARANCE: Patient appears in no acute distress.   EYES: Sclera non-icteric, PERRLA.   ENT Normal appearance of ears and nose.   NECK/THYROID: Neck: no masses. Thyroid: no masses.   LYMPH NODES: No cervical or supraclavicular lymphadenopathy.   CARDIOVASCULAR Heart: RRR, no murmurs; Carotid bruits: none; Peripheral edema: none.   RESPIRATORY: Lungs: clear to auscultation bilaterally; no respiratory distress.   GI (ABDOMEN) No intraabdominal mass appreciated, no hepatosplenomegaly; Hernia: Patient with small hernia at the umbilicus.; Tenderness: Vague tenderness right upper quadrant.  PSYCH: Patient oriented to time, place and person, normal affect.    Assessment/Plan   Problem List Items Addressed This Visit           ICD-10-CM    Right upper quadrant pain - Primary R10.11    Patient with signs and symptoms of possible gallbladder disease with normal ultrasound and CT scan.  Recommend HIDA scan with ejection " fraction.  Patient is to follow-up after imaging to discuss.                 Brie Garza MD 08/04/25 3:03 PM

## 2025-08-04 ENCOUNTER — OFFICE VISIT (OUTPATIENT)
Dept: SURGERY | Facility: CLINIC | Age: 65
End: 2025-08-04
Payer: MEDICARE

## 2025-08-04 VITALS
HEART RATE: 75 BPM | TEMPERATURE: 98 F | WEIGHT: 182 LBS | BODY MASS INDEX: 29.25 KG/M2 | RESPIRATION RATE: 17 BRPM | HEIGHT: 66 IN | DIASTOLIC BLOOD PRESSURE: 87 MMHG | OXYGEN SATURATION: 96 % | SYSTOLIC BLOOD PRESSURE: 132 MMHG

## 2025-08-04 DIAGNOSIS — R10.11 RIGHT UPPER QUADRANT PAIN: Primary | ICD-10-CM

## 2025-08-04 PROCEDURE — 99215 OFFICE O/P EST HI 40 MIN: CPT | Performed by: SURGERY

## 2025-08-04 PROCEDURE — 3008F BODY MASS INDEX DOCD: CPT | Performed by: SURGERY

## 2025-08-04 ASSESSMENT — ENCOUNTER SYMPTOMS
OCCASIONAL FEELINGS OF UNSTEADINESS: 0
DEPRESSION: 0
LOSS OF SENSATION IN FEET: 0

## 2025-08-04 ASSESSMENT — PATIENT HEALTH QUESTIONNAIRE - PHQ9
2. FEELING DOWN, DEPRESSED OR HOPELESS: NOT AT ALL
1. LITTLE INTEREST OR PLEASURE IN DOING THINGS: NOT AT ALL
SUM OF ALL RESPONSES TO PHQ9 QUESTIONS 1 & 2: 0

## 2025-08-04 ASSESSMENT — LIFESTYLE VARIABLES
HOW OFTEN DO YOU HAVE A DRINK CONTAINING ALCOHOL: NEVER
HOW MANY STANDARD DRINKS CONTAINING ALCOHOL DO YOU HAVE ON A TYPICAL DAY: PATIENT DOES NOT DRINK
HOW OFTEN DO YOU HAVE SIX OR MORE DRINKS ON ONE OCCASION: NEVER
SKIP TO QUESTIONS 9-10: 1
AUDIT-C TOTAL SCORE: 0

## 2025-08-04 ASSESSMENT — PAIN SCALES - GENERAL: PAINLEVEL_OUTOF10: 0-NO PAIN

## 2025-08-04 NOTE — ASSESSMENT & PLAN NOTE
Patient with signs and symptoms of possible gallbladder disease with normal ultrasound and CT scan.  Recommend HIDA scan with ejection fraction.  Patient is to follow-up after imaging to discuss.

## 2025-08-14 ENCOUNTER — HOSPITAL ENCOUNTER (OUTPATIENT)
Dept: RADIOLOGY | Facility: HOSPITAL | Age: 65
Discharge: HOME | End: 2025-08-14
Payer: MEDICARE

## 2025-08-14 ENCOUNTER — TELEPHONE (OUTPATIENT)
Dept: SURGERY | Facility: CLINIC | Age: 65
End: 2025-08-14
Payer: MEDICARE

## 2025-08-14 DIAGNOSIS — R10.11 RIGHT UPPER QUADRANT PAIN: ICD-10-CM

## 2025-08-14 PROCEDURE — A9537 TC99M MEBROFENIN: HCPCS | Performed by: SURGERY

## 2025-08-14 PROCEDURE — 78227 HEPATOBIL SYST IMAGE W/DRUG: CPT

## 2025-08-14 PROCEDURE — 3430000001 HC RX 343 DIAGNOSTIC RADIOPHARMACEUTICALS: Performed by: SURGERY

## 2025-08-14 PROCEDURE — 2500000004 HC RX 250 GENERAL PHARMACY W/ HCPCS (ALT 636 FOR OP/ED): Performed by: SURGERY

## 2025-08-14 RX ORDER — KIT FOR THE PREPARATION OF TECHNETIUM TC 99M MEBROFENIN 45 MG/10ML
5 INJECTION, POWDER, LYOPHILIZED, FOR SOLUTION INTRAVENOUS
Status: COMPLETED | OUTPATIENT
Start: 2025-08-14 | End: 2025-08-14

## 2025-08-14 RX ORDER — SINCALIDE 5 UG/5ML
0.02 INJECTION, POWDER, LYOPHILIZED, FOR SOLUTION INTRAVENOUS ONCE
Status: COMPLETED | OUTPATIENT
Start: 2025-08-14 | End: 2025-08-14

## 2025-08-14 RX ADMIN — SINCALIDE 1.65 MCG: 5 INJECTION, POWDER, LYOPHILIZED, FOR SOLUTION INTRAVENOUS at 11:30

## 2025-08-14 RX ADMIN — KIT FOR THE PREPARATION OF TECHNETIUM TC 99M MEBROFENIN 5 MILLICURIE: 45 INJECTION, POWDER, LYOPHILIZED, FOR SOLUTION INTRAVENOUS at 10:18

## 2025-08-18 ENCOUNTER — EVALUATION (OUTPATIENT)
Dept: PHYSICAL THERAPY | Facility: CLINIC | Age: 65
End: 2025-08-18
Payer: MEDICARE

## 2025-08-18 DIAGNOSIS — M77.01 GOLFER'S ELBOW, RIGHT: Primary | ICD-10-CM

## 2025-08-18 DIAGNOSIS — M77.12 LEFT LATERAL EPICONDYLITIS: ICD-10-CM

## 2025-08-18 PROCEDURE — 97161 PT EVAL LOW COMPLEX 20 MIN: CPT | Mod: GP

## 2025-08-18 PROCEDURE — 97140 MANUAL THERAPY 1/> REGIONS: CPT | Mod: GP

## 2025-08-25 ENCOUNTER — APPOINTMENT (OUTPATIENT)
Dept: SURGERY | Facility: CLINIC | Age: 65
End: 2025-08-25
Payer: MEDICARE

## 2025-08-29 ENCOUNTER — OFFICE VISIT (OUTPATIENT)
Dept: SURGERY | Facility: CLINIC | Age: 65
End: 2025-08-29
Payer: MEDICARE

## 2025-08-29 VITALS
WEIGHT: 182 LBS | DIASTOLIC BLOOD PRESSURE: 84 MMHG | SYSTOLIC BLOOD PRESSURE: 129 MMHG | HEIGHT: 66 IN | TEMPERATURE: 98 F | OXYGEN SATURATION: 97 % | BODY MASS INDEX: 29.25 KG/M2 | RESPIRATION RATE: 17 BRPM | HEART RATE: 61 BPM

## 2025-08-29 DIAGNOSIS — K42.0 UMBILICAL HERNIA WITH OBSTRUCTION, WITHOUT GANGRENE: ICD-10-CM

## 2025-08-29 DIAGNOSIS — K82.8 BILIARY DYSKINESIA: Primary | ICD-10-CM

## 2025-08-29 PROBLEM — K42.9 UMBILICAL HERNIA: Status: ACTIVE | Noted: 2025-08-29

## 2025-08-29 PROCEDURE — 4004F PT TOBACCO SCREEN RCVD TLK: CPT | Performed by: SURGERY

## 2025-08-29 PROCEDURE — 99214 OFFICE O/P EST MOD 30 MIN: CPT | Performed by: SURGERY

## 2025-08-29 PROCEDURE — 3008F BODY MASS INDEX DOCD: CPT | Performed by: SURGERY

## 2025-08-29 RX ORDER — NAPROXEN 500 MG/1
500 TABLET ORAL
COMMUNITY

## 2025-08-29 RX ORDER — CEFAZOLIN SODIUM 2 G/100ML
2 INJECTION, SOLUTION INTRAVENOUS ONCE
OUTPATIENT
Start: 2025-08-29 | End: 2025-08-29

## 2025-08-29 ASSESSMENT — PATIENT HEALTH QUESTIONNAIRE - PHQ9
SUM OF ALL RESPONSES TO PHQ9 QUESTIONS 1 & 2: 0
1. LITTLE INTEREST OR PLEASURE IN DOING THINGS: NOT AT ALL
2. FEELING DOWN, DEPRESSED OR HOPELESS: NOT AT ALL

## 2025-08-29 ASSESSMENT — LIFESTYLE VARIABLES
AUDIT-C TOTAL SCORE: 0
HOW OFTEN DO YOU HAVE A DRINK CONTAINING ALCOHOL: NEVER
SKIP TO QUESTIONS 9-10: 1
HOW MANY STANDARD DRINKS CONTAINING ALCOHOL DO YOU HAVE ON A TYPICAL DAY: PATIENT DOES NOT DRINK
HOW OFTEN DO YOU HAVE SIX OR MORE DRINKS ON ONE OCCASION: NEVER

## 2025-08-29 ASSESSMENT — ENCOUNTER SYMPTOMS
LOSS OF SENSATION IN FEET: 0
DEPRESSION: 0
OCCASIONAL FEELINGS OF UNSTEADINESS: 0

## 2025-08-29 ASSESSMENT — COLUMBIA-SUICIDE SEVERITY RATING SCALE - C-SSRS
1. IN THE PAST MONTH, HAVE YOU WISHED YOU WERE DEAD OR WISHED YOU COULD GO TO SLEEP AND NOT WAKE UP?: NO
2. HAVE YOU ACTUALLY HAD ANY THOUGHTS OF KILLING YOURSELF?: NO
6. HAVE YOU EVER DONE ANYTHING, STARTED TO DO ANYTHING, OR PREPARED TO DO ANYTHING TO END YOUR LIFE?: NO

## 2025-08-29 ASSESSMENT — PAIN SCALES - GENERAL: PAINLEVEL_OUTOF10: 10-WORST PAIN EVER

## 2025-09-03 ENCOUNTER — TREATMENT (OUTPATIENT)
Dept: PHYSICAL THERAPY | Facility: CLINIC | Age: 65
End: 2025-09-03
Payer: MEDICARE

## 2025-09-03 DIAGNOSIS — M77.12 LEFT LATERAL EPICONDYLITIS: ICD-10-CM

## 2025-09-03 DIAGNOSIS — M77.01 GOLFER'S ELBOW, RIGHT: Primary | ICD-10-CM

## 2025-09-03 PROCEDURE — 97140 MANUAL THERAPY 1/> REGIONS: CPT | Mod: GP

## 2025-09-03 PROCEDURE — 97110 THERAPEUTIC EXERCISES: CPT | Mod: GP

## 2025-09-17 ENCOUNTER — APPOINTMENT (OUTPATIENT)
Dept: PREADMISSION TESTING | Facility: HOSPITAL | Age: 65
End: 2025-09-17
Payer: MEDICARE

## 2026-02-17 ENCOUNTER — APPOINTMENT (OUTPATIENT)
Dept: PRIMARY CARE | Facility: CLINIC | Age: 66
End: 2026-02-17
Payer: MEDICARE

## (undated) DEVICE — BLADE, SURGICAL, POLYMER COATED P15, STERILE, DISP

## (undated) DEVICE — VESSEL LOOP, WHITE MINI, 2 CARD

## (undated) DEVICE — SUTURE, MONOCRYL, 4-0, 27 IN, PS-2, UNDYED

## (undated) DEVICE — Device

## (undated) DEVICE — SUTURE, MONOCRYL PLUS 4-0, PS-2, 18IN, UNDYED

## (undated) DEVICE — GLOVE, SURGICAL, PROTEXIS PI , 7.5, PF, LF

## (undated) DEVICE — SUTURE, ETHILON, 3-0, 18 IN, PS1, BLACK

## (undated) DEVICE — BANDAGE, ELASTIC, ACE, ACE, DOUBLE LENGTH, 6 X 550 IN, LF

## (undated) DEVICE — SUTURE, MONOCRYL, 27 IN, 3-0 SH, VIOLET